# Patient Record
Sex: FEMALE | Race: WHITE | NOT HISPANIC OR LATINO | Employment: OTHER | URBAN - METROPOLITAN AREA
[De-identification: names, ages, dates, MRNs, and addresses within clinical notes are randomized per-mention and may not be internally consistent; named-entity substitution may affect disease eponyms.]

---

## 2017-01-23 ENCOUNTER — TELEPHONE (OUTPATIENT)
Dept: FAMILY MEDICINE | Facility: CLINIC | Age: 57
End: 2017-01-23

## 2017-01-23 DIAGNOSIS — E03.9 ACQUIRED HYPOTHYROIDISM: Primary | ICD-10-CM

## 2017-01-23 RX ORDER — LEVOTHYROXINE SODIUM 125 UG/1
TABLET ORAL
Qty: 30 TABLET | Refills: 1 | Status: SHIPPED
Start: 2017-01-23 | End: 2017-03-22

## 2017-01-23 NOTE — TELEPHONE ENCOUNTER
levothyroxine last OV - 12/6/16 last tsh -2011- due   Jackeline Nieto MA January 23, 2017 10:26 AM

## 2017-03-22 DIAGNOSIS — E03.9 ACQUIRED HYPOTHYROIDISM: ICD-10-CM

## 2017-03-22 RX ORDER — LEVOTHYROXINE SODIUM 125 UG/1
TABLET ORAL
Qty: 30 TABLET | Refills: 0 | Status: SHIPPED | OUTPATIENT
Start: 2017-03-22 | End: 2017-04-19

## 2017-03-22 NOTE — TELEPHONE ENCOUNTER
levothyroxine last OV 12/6/16 last TSH -2011 due- no appt scheduled  Jackeline Nieto MA March 22, 2017 7:45 AM

## 2017-04-11 DIAGNOSIS — E03.9 ACQUIRED HYPOTHYROIDISM: ICD-10-CM

## 2017-04-11 PROCEDURE — 36415 COLL VENOUS BLD VENIPUNCTURE: CPT | Performed by: FAMILY MEDICINE

## 2017-04-11 PROCEDURE — 84443 ASSAY THYROID STIM HORMONE: CPT | Mod: 90 | Performed by: FAMILY MEDICINE

## 2017-04-12 LAB — TSH BLD-ACNC: 3.56 UIU/ML (ref 0.45–4.5)

## 2017-04-19 ENCOUNTER — MYC REFILL (OUTPATIENT)
Dept: FAMILY MEDICINE | Facility: CLINIC | Age: 57
End: 2017-04-19

## 2017-04-19 DIAGNOSIS — E03.9 ACQUIRED HYPOTHYROIDISM: ICD-10-CM

## 2017-04-20 RX ORDER — LEVOTHYROXINE SODIUM 125 UG/1
125 TABLET ORAL DAILY
Qty: 90 TABLET | Refills: 3 | Status: SHIPPED | OUTPATIENT
Start: 2017-04-20 | End: 2017-05-16

## 2017-04-20 NOTE — TELEPHONE ENCOUNTER
Message from [employer]:  Freya Joseph Thu Apr 20, 2017 8:06 AM        ----- Message -----   From: Radha Bobo   Sent: 4/19/2017 10:38 PM   To: [facility] Triage  Subject: Medication Renewal Request     Original authorizing provider: MD Radha Saleh would like a refill of the following medications:  levothyroxine (SYNTHROID/LEVOTHROID) 125 MCG tablet [Alvarado Henry MD]    Preferred pharmacy: [facility] [id number] [address] [facility] - [place], [place] - [address]    Comment:  I only have one pill left for Wed. April 20. Please refill at [facility] [address] (in [facility] [address]) - [place], [place]. If you have questions, please call me. I did call it into the [facility] pharmacy tonight (Wed)- however, want to make sure I can pick it up tomorrow- Thank you!! Ana Bobo [phone]

## 2017-04-25 ENCOUNTER — TRANSFERRED RECORDS (OUTPATIENT)
Dept: FAMILY MEDICINE | Facility: CLINIC | Age: 57
End: 2017-04-25

## 2017-07-28 DIAGNOSIS — I10 ESSENTIAL HYPERTENSION WITH GOAL BLOOD PRESSURE LESS THAN 130/80: ICD-10-CM

## 2017-07-28 RX ORDER — POTASSIUM CHLORIDE 750 MG/1
CAPSULE, EXTENDED RELEASE ORAL
Qty: 180 CAPSULE | Refills: 3 | Status: SHIPPED | OUTPATIENT
Start: 2017-07-28 | End: 2018-08-01

## 2017-07-28 NOTE — TELEPHONE ENCOUNTER
KLOR-CON SPRINKLE 10 MEQ CR capsule; last OV: 7/27/2016    Component      Latest Ref Rng & Units 7/27/2016   Glucose      65 - 99 mg/dL 98   Urea Nitrogen      6 - 24 mg/dL 14   Creatinine      0.57 - 1.00 mg/dL 0.76   eGFR If NonAfricn Am      >59 mL/min/1.73 88   eGFR If Africn Am      >59 mL/min/1.73 101   BUN/Creatinine Ratio      9 - 23 18   Sodium      134 - 144 mmol/L 136   Potassium      3.5 - 5.2 mmol/L 4.4   Chloride      97 - 108 mmol/L 96 (L)   Total CO2      18 - 28 mmol/L 28   Calcium      8.7 - 10.2 mg/dL 9.5   Protein Total      6.0 - 8.5 g/dL 6.5   Albumin      3.5 - 5.5 g/dL 4.4   Globulin, Total      1.5 - 4.5 g/dL 2.1   A/G Ratio      1.1 - 2.5 2.1   Bilirubin Total      0.0 - 1.2 mg/dL 0.3   Alkaline Phosphatase      39 - 117 IU/L 58   AST      0 - 40 IU/L 19   ALT      0 - 32 IU/L 14

## 2017-08-01 ENCOUNTER — OFFICE VISIT (OUTPATIENT)
Dept: FAMILY MEDICINE | Facility: CLINIC | Age: 57
End: 2017-08-01

## 2017-08-01 VITALS
RESPIRATION RATE: 20 BRPM | OXYGEN SATURATION: 99 % | SYSTOLIC BLOOD PRESSURE: 124 MMHG | BODY MASS INDEX: 24.08 KG/M2 | DIASTOLIC BLOOD PRESSURE: 76 MMHG | HEART RATE: 64 BPM | WEIGHT: 149.8 LBS | HEIGHT: 66 IN

## 2017-08-01 DIAGNOSIS — Z12.31 VISIT FOR SCREENING MAMMOGRAM: ICD-10-CM

## 2017-08-01 DIAGNOSIS — Z12.11 SCREEN FOR COLON CANCER: ICD-10-CM

## 2017-08-01 DIAGNOSIS — Z76.0 ENCOUNTER FOR MEDICATION REFILL: ICD-10-CM

## 2017-08-01 DIAGNOSIS — D23.5 BENIGN NEOPLASM OF SKIN OF TRUNK, EXCEPT SCROTUM: Primary | ICD-10-CM

## 2017-08-01 DIAGNOSIS — M79.672 LEFT FOOT PAIN: ICD-10-CM

## 2017-08-01 DIAGNOSIS — J30.0 CHRONIC VASOMOTOR RHINITIS: ICD-10-CM

## 2017-08-01 PROCEDURE — 11300 SHAVE SKIN LESION 0.5 CM/<: CPT | Performed by: FAMILY MEDICINE

## 2017-08-01 PROCEDURE — 99213 OFFICE O/P EST LOW 20 MIN: CPT | Mod: 25 | Performed by: FAMILY MEDICINE

## 2017-08-01 RX ORDER — TRIAMCINOLONE ACETONIDE 55 UG/1
2 SPRAY, METERED NASAL 2 TIMES DAILY
Qty: 3 BOTTLE | Refills: 3 | Status: SHIPPED | OUTPATIENT
Start: 2017-08-01 | End: 2019-03-05

## 2017-08-01 RX ORDER — TRIAMCINOLONE ACETONIDE 55 UG/1
2 SPRAY, METERED NASAL DAILY
COMMUNITY
End: 2017-08-01

## 2017-08-01 RX ORDER — SIMVASTATIN 10 MG
10 TABLET ORAL AT BEDTIME
Qty: 90 TABLET | Refills: 3 | Status: SHIPPED | OUTPATIENT
Start: 2017-08-01 | End: 2018-10-02

## 2017-08-01 NOTE — PROGRESS NOTES
Subjective:  Here to discuss the status of the following problem(s):(Unless noted the problem(s) is/are stable and if applicable the medicine(s) being used is/are causing no side effects or problems.)    CC: Musculoskeletal Problem (left foot /toe 3rd- sore ); Orders; Mole (on back); and Allergies      3. Left foot pain  3rd toe  Ball of foot   5 years  Of pain and is getting worse  Tried separator between toes  - hurt  No numbness in toes  Has never seen podiatry    4. Lesion on back  Skin on back has a tag that rubs obn her clothing and is irritated    ROS:  General:  NEGATIVE for fever, chills, change in weight Musculoskeletal:  As above     Skin: as above      Past Medical History:   Diagnosis Date     Asthma     while in [place] she had no asthma     Colon adenoma      GERD (gastroesophageal reflux disease)      Migraines      Perioral dermatitis      Psoriasis 2013     developed in [place]- treated with steroids , just sarna in 2016     Restless leg syndrome      Rhinitis      Current Outpatient Prescriptions   Medication     triamcinolone (NASACORT ALLERGY 24HR) 55 MCG/ACT Inhaler     KLOR-CON SPRINKLE 10 MEQ CR capsule     levothyroxine (SYNTHROID/LEVOTHROID) 125 MCG tablet     lisinopril-hydrochlorothiazide (PRINZIDE,ZESTORETIC) 20-12.5 MG per tablet     simvastatin (ZOCOR) 10 MG tablet     cholecalciferol (VITAMIN D) 1000 UNIT tablet     buPROPion (BUDEPRION XL) 300 MG 24 hr tablet     sertraline (ZOLOFT) 100 MG tablet     conjugated estrogens (PREMARIN) vaginal cream     estradiol (ESTRACE) 0.5 MG tablet     Cetirizine HCl (ZYRTEC ALLERGY PO)     albuterol (PROAIR HFA/PROVENTIL HFA/VENTOLIN HFA) 108 (90 BASE) MCG/ACT Inhaler     No current facility-administered medications for this visit.       reports that she has never smoked. She has never used smokeless tobacco. She reports that she drinks about 1.2 oz of alcohol per week  She reports that she does not use illicit drugs.      EXAM:  BP: 124/76    Pulse: 64      Wt Readings from Last 2 Encounters:   08/01/17 67.9 kg (149 lb 12.8 oz)   12/06/16 69.5 kg (153 lb 3.2 oz)       BMI= Body mass index is 24.36 kg/(m^2).    EXAM:  Ana is a tanned woman who has red hair and freckles.  Her upper back on the RIGHT just below trapezius and above her scapula she has a 3 x  4 mm soft fleshy nevus.    Her LEFT foot appears normal she has no pain with squeezing her forefoot she has difficulty flexing her fourth toe but can extend it well there is a sense of fullness at the IP joint on the RIGHT fourth toe IP joint.  The other joints do not feel arthritic.  Sensation in her foot is normal pulses in her foot are normal    Procedure, Xylocaine With Epinephrine 1/10 of a cc injected underneath nevus.  After alcohol prep this lesion is shaved off and so overnight take cautery applied.    Assessment/Plan:  Radha was seen today for musculoskeletal problem, orders, mole and allergies.    Diagnoses and all orders for this visit:    Visit for screening mammogram  -     MAMMO -  Screening Digital Bilateral ([facility]); Future    Screen for colon cancer  She had a terrible experience with one of her colonoscopies and would like to discuss the prep and anesthesia with the gastroenterologist before moving forward with the colonoscopy  -     GASTROENTEROLOGY ADULT REF CONSULT ONLY  -     GASTROENTEROLOGY ADULT REF CONSULT ONLY    Left foot pain-  I believe she is developing arthritis in her toe.  She may benefit from the metatarsal support and I've recommended she see Dr. Dr. Hendrickson.  Benign neoplasm of skin of trunk, except scrotum  removed  Other orders            Alvarado Henry  [facility]

## 2017-08-01 NOTE — MR AVS SNAPSHOT
After Visit Summary   8/1/2017    Radha Bobo    MRN: [id number]           Patient Information     Date Of Birth          1960        Visit Information        Provider Department      8/1/2017 12:00 PM Alvarado Henry MD [facility]        Today's Diagnoses     Visit for screening mammogram    -  1    Screen for colon cancer        Left foot pain        Benign neoplasm of skin of trunk, except scrotum           Follow-ups after your visit        Additional Services     GASTROENTEROLOGY ADULT REF CONSULT ONLY       Preferred Location: [facility] [phone]      Please be aware that coverage of these services is subject to the terms and limitations of your health insurance plan.  Call member services at your health plan with any benefit or coverage questions.  Any procedures must be performed at a [place] facility OR coordinated by your clinic's referral office.    Please bring the following with you to your appointment:    (1) Any X-Rays, CTs or MRIs which have been performed.  Contact the facility where they were done to arrange for  prior to your scheduled appointment.    (2) List of current medications   (3) This referral request   (4) Any documents/labs given to you for this referral            GASTROENTEROLOGY ADULT REF CONSULT ONLY       Preferred Location: [facility] [phone]      Please be aware that coverage of these services is subject to the terms and limitations of your health insurance plan.  Call member services at your health plan with any benefit or coverage questions.  Any procedures must be performed at a [place] facility OR coordinated by your clinic's referral office.    Please bring the following with you to your appointment:    (1) Any X-Rays, CTs or MRIs which have been performed.  Contact the facility where they were done to arrange for  prior to your scheduled appointment.    (2) List of current medications   (3) This referral request   (4)  Any documents/labs given to you for this referral            PODIATRY/FOOT & ANKLE SURGERY REFERRAL       Your provider has referred you to: [facility]     Please be aware that coverage of these services is subject to the terms and limitations of your health insurance plan.  Call member services at your health plan with any benefit or coverage questions.      Please bring the following to your appointment:  >>   Any x-rays, CTs or MRIs which have been performed.  Contact the facility where they were done to arrange for  prior to your scheduled appointment.    >>   List of current medications   >>   This referral request   >>   Any documents/labs given to you for this referral                  Future tests that were ordered for you today     Open Future Orders        Priority Expected Expires Ordered    MAMMO -  Screening Digital Bilateral ([facility]) Routine  8/1/2018 8/1/2017            Who to contact     If you have questions or need follow up information about today's clinic visit or your schedule please contact [facility] directly at [phone].  Normal or non-critical lab and imaging results will be communicated to you by [facility], letter or phone within 4 business days after the clinic has received the results. If you do not hear from us within 7 days, please contact the clinic through [facility] or phone. If you have a critical or abnormal lab result, we will notify you by phone as soon as possible.  Submit refill requests through [facility] or call your pharmacy and they will forward the refill request to us. Please allow 3 business days for your refill to be completed.          Additional Information About Your Visit        [facility] Information     [facility] gives you secure access to your electronic health record. If you see a primary care provider, you can also send messages to your care team and make appointments. If you have questions, please call your primary care  "clinic.  If you do not have a primary care provider, please call [phone] and they will assist you.        Care EveryWhere ID     This is your Care EveryWhere ID. This could be used by other organizations to access your [place] medical records  [id number]        Your Vitals Were     Pulse Respirations Height Pulse Oximetry BMI (Body Mass Index)       64 20 1.67 m (5' 5.75\") 99% 24.36 kg/m2        Blood Pressure from Last 3 Encounters:   08/01/17 124/76   12/06/16 102/68   07/27/16 112/84    Weight from Last 3 Encounters:   08/01/17 67.9 kg (149 lb 12.8 oz)   12/06/16 69.5 kg (153 lb 3.2 oz)   07/27/16 67 kg (147 lb 12.8 oz)              We Performed the Following     GASTROENTEROLOGY ADULT REF CONSULT ONLY     GASTROENTEROLOGY ADULT REF CONSULT ONLY     PODIATRY/FOOT & ANKLE SURGERY REFERRAL        Primary Care Provider Office Phone # Fax #    Alvarado Henry [phone]       [facility] [address] [place] [address]        Equal Access to Services     [facility]: Hadii aad ku hadasho Soomaali, waaxda luqadaha, qaybta kaalmada adeegyada, waxay idiin hayaan adeeg junarapura lavlad . So [facility] [phone].    ATENCIÓN: Si habla español, tiene a dumont disposición servicios gratuitos de asistencia lingüística. Llame al [phone].    We comply with applicable federal civil rights laws and [place] laws. We do not discriminate on the basis of race, color, national origin, age, disability sex, sexual orientation or gender identity.            Thank you!     Thank you for choosing [facility]  for your care. Our goal is always to provide you with excellent care. Hearing back from our patients is one way we can continue to improve our services. Please take a few minutes to complete the written survey that you may receive in the mail after your visit with us. Thank you!             Your Updated Medication List - Protect others around you: Learn how to safely use, " store and throw away your medicines at www.disposemymeds.org.          This list is accurate as of: 8/1/17 12:31 PM.  Always use your most recent med list.                   Brand Name Dispense Instructions for use Diagnosis    albuterol 108 (90 BASE) MCG/ACT Inhaler    PROAIR HFA/PROVENTIL HFA/VENTOLIN HFA    1 Inhaler    Inhale 2 puffs into the lungs 4 times daily    Cough, Upper respiratory tract infection, unspecified type       buPROPion 300 MG 24 hr tablet    BUDEPRION XL    90 tablet    Take 1 tablet (300 mg) by mouth every morning    Major depression in complete remission (H)       cholecalciferol 1000 UNIT tablet    vitamin D    90 tablet    Take 1 tablet (1,000 Units) by mouth daily    Low vitamin D level       conjugated estrogens cream    PREMARIN    42.5 g    Place 42.5 g vaginally twice a week.    Menopausal syndrome (hot flashes)       estradiol 0.5 MG tablet    ESTRACE    90 tablet    Take 1 tablet by mouth daily.    Preventative health care       KLOR-CON SPRINKLE 10 MEQ CR capsule   Generic drug:  potassium chloride     180 capsule    TAKE 2 CAPSULES BY MOUTH EVERY DAY    Essential hypertension with goal blood pressure less than 130/80       levothyroxine 125 MCG tablet    SYNTHROID/LEVOTHROID    30 tablet    TAKE ONE TABLET BY MOUTH EVERY DAY **BLOOD WORK REQUIRED FOR FURTHER REFILLS**    Acquired hypothyroidism       lisinopril-hydrochlorothiazide 20-12.5 MG per tablet    PRINZIDE/ZESTORETIC    90 tablet    Take 1 tablet by mouth daily    Encounter for medication refill       NASACORT ALLERGY 24HR 55 MCG/ACT Inhaler   Generic drug:  triamcinolone      Spray 2 sprays into both nostrils daily        sertraline 100 MG tablet    ZOLOFT    90 tablet    Take  by mouth. TAKE 1 TABLET BY MOUTH DAILY    GERD (gastroesophageal reflux disease), Restless leg syndrome, Colon adenoma, Rhinitis, Perioral dermatitis, Asthma, Hyperlipidemia LDL goal <130, Depressive disorder, not elsewhere classified, Anxiety,  Hypothyroidism, Need for prophylactic vaccination with tetanus-diphtheria (TD)       simvastatin 10 MG tablet    ZOCOR    90 tablet    Take 1 tablet (10 mg) by mouth At Bedtime    Encounter for medication refill       ZYRTEC ALLERGY PO      Take  by mouth daily.    GERD (gastroesophageal reflux disease), Restless leg syndrome, Colon adenoma, Rhinitis, Perioral dermatitis, Asthma, Hyperlipidemia LDL goal <130, Depressive disorder, not elsewhere classified, Anxiety, Hypothyroidism, Need for prophylactic vaccination with tetanus-diphtheria (TD)

## 2017-08-02 ENCOUNTER — HOSPITAL ENCOUNTER (OUTPATIENT)
Dept: MAMMOGRAPHY | Facility: CLINIC | Age: 57
Discharge: HOME | End: 2017-08-02
Attending: FAMILY MEDICINE | Admitting: FAMILY MEDICINE
Payer: COMMERCIAL

## 2017-08-02 DIAGNOSIS — Z12.31 VISIT FOR SCREENING MAMMOGRAM: ICD-10-CM

## 2017-08-02 PROCEDURE — 77063 BREAST TOMOSYNTHESIS BI: CPT

## 2017-08-09 DIAGNOSIS — D12.6 COLON ADENOMA: ICD-10-CM

## 2017-08-09 DIAGNOSIS — G25.81 RESTLESS LEG SYNDROME: ICD-10-CM

## 2017-08-09 RX ORDER — SERTRALINE HYDROCHLORIDE 100 MG/1
TABLET, FILM COATED ORAL
Qty: 90 TABLET | Refills: 3 | Status: SHIPPED | OUTPATIENT
Start: 2017-08-09 | End: 2018-08-01

## 2017-08-17 DIAGNOSIS — F32.5 MAJOR DEPRESSION IN COMPLETE REMISSION (H): ICD-10-CM

## 2017-08-17 RX ORDER — BUPROPION HYDROCHLORIDE 300 MG/1
TABLET ORAL
Qty: 90 TABLET | Refills: 3 | Status: SHIPPED | OUTPATIENT
Start: 2017-08-17 | End: 2018-08-06

## 2017-08-17 NOTE — TELEPHONE ENCOUNTER
wellbutrin last OV - 12/6/16 last labs 4/11/17   Jackleine Nieto MA August 17, 2017 8:08 AM    PHQ-9 SCORE 7/27/2016 12/6/2016   Total Score 0 0

## 2017-09-26 DIAGNOSIS — R79.89 LOW VITAMIN D LEVEL: ICD-10-CM

## 2017-09-26 RX ORDER — CHOLECALCIFEROL (VITAMIN D3) 25 MCG
TABLET ORAL
Qty: 90 TABLET | Refills: 3 | Status: SHIPPED | OUTPATIENT
Start: 2017-09-26

## 2017-10-06 DIAGNOSIS — Z76.0 ENCOUNTER FOR MEDICATION REFILL: ICD-10-CM

## 2017-10-06 DIAGNOSIS — I10 ESSENTIAL HYPERTENSION WITH GOAL BLOOD PRESSURE LESS THAN 130/80: Primary | ICD-10-CM

## 2017-10-06 RX ORDER — LISINOPRIL AND HYDROCHLOROTHIAZIDE 12.5; 2 MG/1; MG/1
TABLET ORAL
Qty: 30 TABLET | Refills: 1 | Status: SHIPPED | OUTPATIENT
Start: 2017-10-06 | End: 2017-12-06

## 2017-10-06 NOTE — TELEPHONE ENCOUNTER
BP Medication refill        BP Readings from Last 3 Encounters:   08/01/17 124/76   12/06/16 102/68   07/27/16 112/84         BMP within 6 months? DUE FOR LABS  Last Basic Metabolic Panel:  Lab Results   Component Value Date     07/27/2016      Lab Results   Component Value Date    POTASSIUM 4.4 07/27/2016     Lab Results   Component Value Date    CHLORIDE 96 07/27/2016     Lab Results   Component Value Date    NEETU 9.5 07/27/2016     No results found for: CO2  Lab Results   Component Value Date    BUN 14 07/27/2016    BUN 18 07/27/2016     Lab Results   Component Value Date    CR 0.76 07/27/2016     Lab Results   Component Value Date    GLC 98 07/27/2016

## 2017-10-06 NOTE — TELEPHONE ENCOUNTER
Overdue for BMP per Dr. Key (oncall for Dr. Delarosa). Last BMP was 7/2016.   Also due for lipids - takes simvastatin.   Left message on patient's vm due for fasting labs and cpx ----med check at a minimum. Advised sending 2 month supply to pharm - get appt(s) for MD and labs before next refill needed.  Davida Jaramillo RN

## 2017-11-08 ENCOUNTER — VIRTUAL VISIT (OUTPATIENT)
Dept: FAMILY MEDICINE | Facility: OTHER | Age: 57
End: 2017-11-08

## 2017-11-09 NOTE — PROGRESS NOTES
"Date:   Clinician: Elvira Lucero  Clinician NPI: [id number]  Patient: Radha Bobo  Patient : 1960  [facility] [place] [address]  Patient Phone: [phone]  Visit Protocol: Ear pain  Patient Summary:  Radha is a 57 year old ( : 1960 ) female who initiated a Visit for swimmer's ear (ear pain). When asked the question \"Please sign me up to receive news, health information and promotions from [facility].\", Radha responded \"Yes\".    Radha reports that her ear pain started 5-7 days ago. The ear pain is located inside the right ear.   In addition to the ear pain, Radha is experiencing a feeling of fullness, tenderness, and itchiness in the ear(s). Her ear(s) is tender to the touch on the ear canal.   Symptom Details   Pain: Radha is experiencing moderate pain. It gets worse when she eats or chews and gently pulls on the earlobe(s).    Radha denies redness in the ear(s). She also denies feeling feverish, having fluid draining from the ear(s), the possibility of a foreign object in the ear(s), recent injuries near the ear(s), and ever having ear tubes. Radha denies swimming within the past week.   Radha reports flying within the past week.   Weight: 147 lbs   She denies pregnancy and denies breastfeeding. She does not menstruate.   She does not smoke or use smokeless tobacco.   Additional health information pertinent to this Visit as reported by the patient (free text): After putting earache drops (over the counter) in ear tonight- I used q-tip and dried blood was on after wiping inside of right ear. Have had 2 ear infections before after flying and came back from [place] last week.   MEDICATIONS:  Thyroid, oxymetazoline (Afrin, Dristan), bupropion (Buproban, Wellbutrin, Zyban), lisinopril HCTZ (Prinzide, Zestoretic), lisinopril (Prinivil, Zestril), saline nose drops/spray (SeaMist, Ocean Nasal Spray), pseudoephedrine (Sudafed, " Dimetapp), sertraline (Zoloft), and simvastatin (Zocor)  , ALLERGIES:  NKDA   Clinician Response:  Dear Radha,   I am sorry you are not feeling well. Your health is our priority. Based on the information you have provided, an in-person evaluation is required. Please be seen in a clinic or urgent care to receive the additional care you need.  You will not be charged for this Visit. Thank you for trusting us with your care.   Diagnosis: Refer for additional evaluation  Diagnosis ICD: R69  Diagnosis ICD: 462.0

## 2017-11-15 ENCOUNTER — OFFICE VISIT (OUTPATIENT)
Dept: FAMILY MEDICINE | Facility: CLINIC | Age: 57
End: 2017-11-15

## 2017-11-15 VITALS
WEIGHT: 153.2 LBS | HEART RATE: 76 BPM | BODY MASS INDEX: 24.92 KG/M2 | TEMPERATURE: 97.7 F | RESPIRATION RATE: 20 BRPM | OXYGEN SATURATION: 97 % | DIASTOLIC BLOOD PRESSURE: 64 MMHG | SYSTOLIC BLOOD PRESSURE: 116 MMHG

## 2017-11-15 DIAGNOSIS — E03.8 OTHER SPECIFIED HYPOTHYROIDISM: ICD-10-CM

## 2017-11-15 DIAGNOSIS — I10 ESSENTIAL HYPERTENSION WITH GOAL BLOOD PRESSURE LESS THAN 130/80: ICD-10-CM

## 2017-11-15 DIAGNOSIS — H93.8X1 EAR PRESSURE, RIGHT: ICD-10-CM

## 2017-11-15 DIAGNOSIS — J45.20 INTERMITTENT ASTHMA, UNSPECIFIED ASTHMA SEVERITY, UNSPECIFIED WHETHER COMPLICATED: Primary | ICD-10-CM

## 2017-11-15 DIAGNOSIS — E78.00 HIGH CHOLESTEROL: ICD-10-CM

## 2017-11-15 DIAGNOSIS — R05.9 COUGH: ICD-10-CM

## 2017-11-15 PROCEDURE — 99213 OFFICE O/P EST LOW 20 MIN: CPT | Performed by: FAMILY MEDICINE

## 2017-11-15 PROCEDURE — 84443 ASSAY THYROID STIM HORMONE: CPT | Mod: 90 | Performed by: FAMILY MEDICINE

## 2017-11-15 PROCEDURE — 80061 LIPID PANEL: CPT | Mod: 90 | Performed by: FAMILY MEDICINE

## 2017-11-15 PROCEDURE — 80053 COMPREHEN METABOLIC PANEL: CPT | Mod: 90 | Performed by: FAMILY MEDICINE

## 2017-11-15 PROCEDURE — 36415 COLL VENOUS BLD VENIPUNCTURE: CPT | Performed by: FAMILY MEDICINE

## 2017-11-15 RX ORDER — CODEINE PHOSPHATE AND GUAIFENESIN 10; 100 MG/5ML; MG/5ML
SOLUTION ORAL
Qty: 120 ML | Refills: 0 | Status: SHIPPED | OUTPATIENT
Start: 2017-11-15 | End: 2018-12-23

## 2017-11-15 ASSESSMENT — PATIENT HEALTH QUESTIONNAIRE - PHQ9
5. POOR APPETITE OR OVEREATING: NOT AT ALL
SUM OF ALL RESPONSES TO PHQ QUESTIONS 1-9: 1

## 2017-11-15 ASSESSMENT — ANXIETY QUESTIONNAIRES
1. FEELING NERVOUS, ANXIOUS, OR ON EDGE: NOT AT ALL
6. BECOMING EASILY ANNOYED OR IRRITABLE: NOT AT ALL
2. NOT BEING ABLE TO STOP OR CONTROL WORRYING: SEVERAL DAYS
5. BEING SO RESTLESS THAT IT IS HARD TO SIT STILL: NOT AT ALL
3. WORRYING TOO MUCH ABOUT DIFFERENT THINGS: NOT AT ALL
7. FEELING AFRAID AS IF SOMETHING AWFUL MIGHT HAPPEN: NOT AT ALL
IF YOU CHECKED OFF ANY PROBLEMS ON THIS QUESTIONNAIRE, HOW DIFFICULT HAVE THESE PROBLEMS MADE IT FOR YOU TO DO YOUR WORK, TAKE CARE OF THINGS AT HOME, OR GET ALONG WITH OTHER PEOPLE: NOT DIFFICULT AT ALL
GAD7 TOTAL SCORE: 1

## 2017-11-15 NOTE — LETTER
My Asthma Action Plan  Name: Radha Bobo   YOB: 1960  Date: 11/15/2017   My doctor: Alvarado Henry MD   My clinic: [facility]        My Control Medicine: zyrtec   My Rescue Medicine: Albuterol (Proair/Ventolin/Proventil) inhaler prn  My Oral Steroid Medicine: prednisone My Asthma Severity: intermittent  Avoid your asthma triggers: smoke, upper respiratory infections, dust mites, humidity, strong odors and fumes and emotions               GREEN ZONE   Good Control    I feel good    No cough or wheeze    Can work, sleep and play without asthma symptoms       Take your asthma control medicine every day.     1. If exercise triggers your asthma, take your rescue medication    15 minutes before exercise or sports, and    During exercise if you have asthma symptoms  2. Spacer to use with inhaler: If you have a spacer, make sure to use it with your inhaler             YELLOW ZONE Getting Worse  I have ANY of these:    I do not feel good    Cough or wheeze    Chest feels tight    Wake up at night   1. Keep taking your Green Zone medications  2. Start taking your rescue medicine:    every 20 minutes for up to 1 hour. Then every 4 hours for 24-48 hours.  3. If you stay in the Yellow Zone for more than 12-24 hours, contact your doctor.  4. If you do not return to the Green Zone in 12-24 hours or you get worse, start taking your oral steroid medicine if prescribed by your provider.           RED ZONE Medical Alert - Get Help  I have ANY of these:    I feel awful    Medicine is not helping    Breathing getting harder    Trouble walking or talking    Nose opens wide to breathe       1. Take your rescue medicine NOW  2. If your provider has prescribed an oral steroid medicine, start taking it NOW  3. Call your doctor NOW  4. If you are still in the Red Zone after 20 minutes and you have not reached your doctor:    Take your rescue medicine again and    Call 911 or go to the emergency room right  away    See your regular doctor within 2 weeks of an Emergency Room or Urgent Care visit for follow-up treatment.        Electronically signed by: Jackeline Nieto, November 15, 2017    Annual Reminders:  Meet with Asthma Educator,  Flu Shot in the Fall, consider Pneumonia Vaccination for patients with asthma (aged 19 and older).    Pharmacy:    [facility] - FAX ONLY  [id number] - [place], [place] - [address]                    Asthma Triggers  How To Control Things That Make Your Asthma Worse    Triggers are things that make your asthma worse.  Look at the list below to help you find your triggers and what you can do about them.  You can help prevent asthma flare-ups by staying away from your triggers.      Trigger                                                          What you can do   Cigarette Smoke  Tobacco smoke can make asthma worse. Do not allow smoking in your home, car or around you.  Be sure no one smokes at a child s day care or school.  If you smoke, ask your health care provider for ways to help you quit.  Ask family members to quit too.  Ask your health care provider for a referral to Quit Plan to help you quit smoking, or call 2-125-692-PLAN.     Colds, Flu, Bronchitis  These are common triggers of asthma. Wash your hands often.  Don t touch your eyes, nose or mouth.  Get a flu shot every year.     Dust Mites  These are tiny bugs that live in cloth or carpet. They are too small to see. Wash sheets and blankets in hot water every week.   Encase pillows and mattress in dust mite proof covers.  Avoid having carpet if you can. If you have carpet, vacuum weekly.   Use a dust mask and HEPA vacuum.   Pollen and Outdoor Mold  Some people are allergic to trees, grass, or weed pollen, or molds. Try to keep your windows closed.  Limit time out doors when pollen count is high.   Ask you health care provider about taking medicine during allergy season.     Animal Dander  Some  people are allergic to skin flakes, urine or saliva from pets with fur or feathers. Keep pets with fur or feathers out of your home.    If you can t keep the pet outdoors, then keep the pet out of your bedroom.  Keep the bedroom door closed.  Keep pets off cloth furniture and away from stuffed toys.     Mice, Rats, and Cockroaches  Some people are allergic to the waste from these pests.   Cover food and garbage.  Clean up spills and food crumbs.  Store grease in the refrigerator.   Keep food out of the bedroom.   Indoor Mold  This can be a trigger if your home has high moisture. Fix leaking faucets, pipes, or other sources of water.   Clean moldy surfaces.  Dehumidify basement if it is damp and smelly.   Smoke, Strong Odors, and Sprays  These can reduce air quality. Stay away from strong odors and sprays, such as perfume, powder, hair spray, paints, smoke incense, paint, cleaning products, candles and new carpet.   Exercise or Sports  Some people with asthma have this trigger. Be active!  Ask your doctor about taking medicine before sports or exercise to prevent symptoms.    Warm up for 5-10 minutes before and after sports or exercise.     Other Triggers of Asthma  Cold air:  Cover your nose and mouth with a scarf.  Sometimes laughing or crying can be a trigger.  Some medicines and food can trigger asthma.

## 2017-11-15 NOTE — PROGRESS NOTES
Multiple medical issues    Right ear  Post cold has some pressure feeling  Muffled hearing and mild , has woken her   teeth upper pain     Hist of asthma   Has cough and mucous   Mild sob uses inhaler \  Using three times /d for a week  Feels like the same     Past Medical History:   Diagnosis Date     Asthma     while in [place] she had no asthma     Colon adenoma      GERD (gastroesophageal reflux disease)      High cholesterol      Migraines      Perioral dermatitis      Psoriasis 2013     developed in [place]- treated with steroids , just sarna in 2016     Restless leg syndrome      Rhinitis      Past Surgical History:   Procedure Laterality Date     HYSTERECTOMY, PAP NO LONGER INDICATED       Current Outpatient Prescriptions   Medication     guaiFENesin-codeine (ROBITUSSIN AC) 100-10 MG/5ML SOLN solution     lisinopril-hydrochlorothiazide (PRINZIDE/ZESTORETIC) 20-12.5 MG per tablet     VITAMIN D3 1000 UNITS tablet     buPROPion (WELLBUTRIN XL) 300 MG 24 hr tablet     sertraline (ZOLOFT) 100 MG tablet     simvastatin (ZOCOR) 10 MG tablet     triamcinolone (NASACORT ALLERGY 24HR) 55 MCG/ACT Inhaler     KLOR-CON SPRINKLE 10 MEQ CR capsule     levothyroxine (SYNTHROID/LEVOTHROID) 125 MCG tablet     albuterol (PROAIR HFA/PROVENTIL HFA/VENTOLIN HFA) 108 (90 BASE) MCG/ACT Inhaler     conjugated estrogens (PREMARIN) vaginal cream     estradiol (ESTRACE) 0.5 MG tablet     Cetirizine HCl (ZYRTEC ALLERGY PO)     No current facility-administered medications for this visit.      Ros no fever,     /64  Pulse 76  Temp 97.7  F (36.5  C) (Oral)  Resp 20  Wt 69.5 kg (153 lb 3.2 oz)  SpO2 97%  BMI 24.92 kg/m2    VS noted   EYES = NL  EARS= NL  MOUTH =NL  NECK = NL  LUNGS=NL  COR=NL  ABD=NL  EXT=NL   MOOD AFFECT =NL      ASSESSMENT / PLAN  (J45.20) Intermittent asthma, unspecified asthma severity, unspecified whether complicated  (primary encounter diagnosis)  Comment:   Not an issue , has no flare now.  Rare prn  albuterol    (E03.8) Other specified hypothyroidism  Plan: TSH ([facility])         (I10) Essential hypertension with goal blood pressure less than 130/80  Plan: Comp. Metabolic Panel (14) ([facility])        (E78.00) High cholesterol  Plan: Lipid Panel ([facility])      (R05) Cough  Prn use of for flare ups. This lasted a full year last year  Plan: guaiFENesin-codeine (ROBITUSSIN AC) 100-10         MG/5ML SOLN solution       (H93.8X1) Ear pressure, right  As she is to travel soon and I see nothing to explain issue  Plan: Referral to [facility]         Head/Neck Surgery        **   Asaf Delarosa MD

## 2017-11-15 NOTE — MR AVS SNAPSHOT
After Visit Summary   11/15/2017    Radha Bobo    MRN: [id number]           Patient Information     Date Of Birth          1960        Visit Information        Provider Department      11/15/2017 8:45 AM Alvarado Henry MD [facility]        Today's Diagnoses     Intermittent asthma, unspecified asthma severity, unspecified whether complicated    -  1    Other specified hypothyroidism        Essential hypertension with goal blood pressure less than 130/80        High cholesterol        Cough        Ear pressure, right           Follow-ups after your visit        Additional Services     Referral to [facility]ck [facility]       Referral to [facility]  Phone:  [phone]  Reason for Referral:  ANY doc -    Please be aware that coverage of these services is subject to the terms and limitations of your health insurance plan.  Call member services at your health plan with any benefit or coverage questions.                  Who to contact     If you have questions or need follow up information about today's clinic visit or your schedule please contact [facility] directly at [phone].  Normal or non-critical lab and imaging results will be communicated to you by Valke[employer]art, letter or phone within 4 business days after the clinic has received the results. If you do not hear from us within 7 days, please contact the clinic through [employer] Brother[employer] or phone. If you have a critical or abnormal lab result, we will notify you by phone as soon as possible.  Submit refill requests through Piece of Cake or call your pharmacy and they will forward the refill request to us. Please allow 3 business days for your refill to be completed.          Additional Information About Your Visit        Valkeehart Information     Piece of Cake gives you secure access to your electronic health record. If you see a primary care provider, you can also send messages to  your care team and make appointments. If you have questions, please call your primary care clinic.  If you do not have a primary care provider, please call [phone] and they will assist you.        Care EveryWhere ID     This is your Care EveryWhere ID. This could be used by other organizations to access your [facility] medical records  [id number]        Your Vitals Were     Pulse Temperature Respirations Pulse Oximetry BMI (Body Mass Index)       76 97.7  F (36.5  C) (Oral) 20 97% 24.92 kg/m2        Blood Pressure from Last 3 Encounters:   11/15/17 116/64   08/01/17 124/76   12/06/16 102/68    Weight from Last 3 Encounters:   11/15/17 69.5 kg (153 lb 3.2 oz)   08/01/17 67.9 kg (149 lb 12.8 oz)   12/06/16 69.5 kg (153 lb 3.2 oz)              We Performed the Following     Comp. Metabolic Panel (14) ([facility])     DEPRESSION ACTION PLAN (DAP)     Lipid Panel ([facility])     Referral to [facility]     TSH ([facility])          Today's Medication Changes          These changes are accurate as of: 11/15/17  9:25 AM.  If you have any questions, ask your nurse or doctor.               Start taking these medicines.        Dose/Directions    guaiFENesin-codeine 100-10 MG/5ML Soln solution   Commonly known as:  ROBITUSSIN AC   Used for:  Cough, Intermittent asthma, unspecified asthma severity, unspecified whether complicated   Started by:  Alvarado Henry MD        Take one to two tsp every 4-6 hours prn cough   Quantity:  120 mL   Refills:  0            Where to get your medicines      Some of these will need a paper prescription and others can be bought over the counter.  Ask your nurse if you have questions.     Bring a paper prescription for each of these medications     guaiFENesin-codeine 100-10 MG/5ML Soln solution                Primary Care Provider Office Phone # Fax #    Alvarado Henry [phone] [address]  [facility] [id number]        Equal Access  to Services     JIM PIÑA : Laurie Rincon, wastuart colon, qaarpitjames tesfayealjoe short. So [facility] [phone].    ATENCIÓN: Si martínezla nimisha, tiene a dumont disposición servicios gratuitos de asistencia lingüística. Llame al [phone].    We comply with applicable federal civil rights laws and [place] laws. We do not discriminate on the basis of race, color, national origin, age, disability, sex, sexual orientation, or gender identity.            Thank you!     Thank you for choosing [facility]  for your care. Our goal is always to provide you with excellent care. Hearing back from our patients is one way we can continue to improve our services. Please take a few minutes to complete the written survey that you may receive in the mail after your visit with us. Thank you!             Your Updated Medication List - Protect others around you: Learn how to safely use, store and throw away your medicines at www.disposemymeds.org.          This list is accurate as of: 11/15/17  9:25 AM.  Always use your most recent med list.                   Brand Name Dispense Instructions for use Diagnosis    albuterol 108 (90 BASE) MCG/ACT Inhaler    PROAIR HFA/PROVENTIL HFA/VENTOLIN HFA    1 Inhaler    Inhale 2 puffs into the lungs 4 times daily    Cough, Upper respiratory tract infection, unspecified type       buPROPion 300 MG 24 hr tablet    WELLBUTRIN XL    90 tablet    TAKE 1 TABLET (300 MG) BY MOUTH EVERY MORNING    Major depression in complete remission (H)       cholecalciferol 1000 UNIT tablet    vitamin D3    90 tablet    TAKE 1 TABLET (1,000 UNITS) BY MOUTH DAILY    Low vitamin D level       conjugated estrogens cream    PREMARIN    42.5 g    Place 42.5 g vaginally twice a week.    Menopausal syndrome (hot flashes)       estradiol 0.5 MG tablet    ESTRACE    90 tablet    Take 1 tablet by mouth daily.    Preventative health care        guaiFENesin-codeine 100-10 MG/5ML Soln solution    ROBITUSSIN AC    120 mL    Take one to two tsp every 4-6 hours prn cough    Cough, Intermittent asthma, unspecified asthma severity, unspecified whether complicated       KLOR-CON SPRINKLE 10 MEQ CR capsule   Generic drug:  potassium chloride     180 capsule    TAKE 2 CAPSULES BY MOUTH EVERY DAY    Essential hypertension with goal blood pressure less than 130/80       levothyroxine 125 MCG tablet    SYNTHROID/LEVOTHROID    30 tablet    TAKE ONE TABLET BY MOUTH EVERY DAY **BLOOD WORK REQUIRED FOR FURTHER REFILLS**    Acquired hypothyroidism       lisinopril-hydrochlorothiazide 20-12.5 MG per tablet    PRINZIDE/ZESTORETIC    30 tablet    TAKE 1 TABLET BY MOUTH DAILY    Essential hypertension with goal blood pressure less than 130/80       sertraline 100 MG tablet    ZOLOFT    90 tablet    Take 1 tablet daily    Restless leg syndrome, Colon adenoma       simvastatin 10 MG tablet    ZOCOR    90 tablet    Take 1 tablet (10 mg) by mouth At Bedtime    Encounter for medication refill       triamcinolone 55 MCG/ACT Inhaler    NASACORT ALLERGY 24HR    3 Bottle    Spray 2 sprays into both nostrils 2 times daily    Chronic vasomotor rhinitis       ZYRTEC ALLERGY PO      Take  by mouth daily.    GERD (gastroesophageal reflux disease), Restless leg syndrome, Colon adenoma, Rhinitis, Perioral dermatitis, Asthma, Hyperlipidemia LDL goal <130, Depressive disorder, not elsewhere classified, Anxiety, Hypothyroidism, Need for prophylactic vaccination with tetanus-diphtheria (TD)

## 2017-11-15 NOTE — LETTER
My Depression Action Plan  Name: Radha Bobo   Date of Birth 1960  Date: 11/15/2017    My doctor: Alvarado Henry   My clinic: [facility] [address] [place] [address] [phone]          GREEN    ZONE   Good Control    What it looks like:     Things are going generally well. You have normal up s and down s. You may even feel depressed from time to time, but bad moods usually last less than a day.   What you need to do:  1. Continue to care for yourself (see self care plan)  2. Check your depression survival kit and update it as needed  3. Follow your physician s recommendations including any medication.  4. Do not stop taking medication unless you consult with your physician first.           YELLOW         ZONE Getting Worse    What it looks like:     Depression is starting to interfere with your life.     It may be hard to get out of bed; you may be starting to isolate yourself from others.    Symptoms of depression are starting to last most all day and this has happened for several days.     You may have suicidal thoughts but they are not constant.   What you need to do:     1. Call your care team, your response to treatment will improve if you keep your care team informed of your progress. Yellow periods are signs an adjustment may need to be made.     2. Continue your self-care, even if you have to fake it!    3. Talk to someone in your support network    4. Open up your depression survival kit           RED    ZONE Medical Alert - Get Help    What it looks like:     Depression is seriously interfering with your life.     You may experience these or other symptoms: You can t get out of bed most days, can t work or engage in other necessary activities, you have trouble taking care of basic hygiene, or basic responsibilities, thoughts of suicide or death that will not go away, self-injurious behavior.     What you need to do:  1. Call your care team and request  a same-day appointment. If they are not available (weekends or after hours) call your local crisis line, emergency room or 911.      Electronically signed by: Jackeline Nieto, November 15, 2017    Depression Self Care Plan / Survival Kit    Self-Care for Depression  Here s the deal. Your body and mind are really not as separate as most people think.  What you do and think affects how you feel and how you feel influences what you do and think. This means if you do things that people who feel good do, it will help you feel better.  Sometimes this is all it takes.  There is also a place for medication and therapy depending on how severe your depression is, so be sure to consult with your medical provider and/ or Behavioral Health Consultant if your symptoms are worsening or not improving.     In order to better manage my stress, I will:    Exercise  Get some form of exercise, every day. This will help reduce pain and release endorphins, the  feel good  chemicals in your brain. This is almost as good as taking antidepressants!  This is not the same as joining a gym and then never going! (they count on that by the way ) It can be as simple as just going for a walk or doing some gardening, anything that will get you moving.      Hygiene   Maintain good hygiene (Get out of bed in the morning, Make your bed, Brush your teeth, Take a shower, and Get dressed like you were going to work, even if you are unemployed).  If your clothes don't fit try to get ones that do.    Diet  I will strive to eat foods that are good for me, drink plenty of water, and avoid excessive sugar, caffeine, alcohol, and other mood-altering substances.  Some foods that are helpful in depression are: complex carbohydrates, B vitamins, flaxseed, fish or fish oil, fresh fruits and vegetables.    Psychotherapy  I agree to participate in Individual Therapy (if recommended).    Medication  If prescribed medications, I agree to take them.  Missing doses can  result in serious side effects.  I understand that drinking alcohol, or other illicit drug use, may cause potential side effects.  I will not stop my medication abruptly without first discussing it with my provider.    Staying Connected With Others  I will stay in touch with my friends, family members, and my primary care provider/team.    Use your imagination  Be creative.  We all have a creative side; it doesn t matter if it s oil painting, sand castles, or mud pies! This will also kick up the endorphins.    Witness Beauty  (AKA stop and smell the roses) Take a look outside, even in mid-winter. Notice colors, textures. Watch the squirrels and birds.     Service to others  Be of service to others.  There is always someone else in need.  By helping others we can  get out of ourselves  and remember the really important things.  This also provides opportunities for practicing all the other parts of the program.    Humor  Laugh and be silly!  Adjust your TV habits for less news and crime-drama and more comedy.    Control your stress  Try breathing deep, massage therapy, biofeedback, and meditation. Find time to relax each day.     My support system    Clinic Contact:  Phone number:    Contact 1:  Phone number:    Contact 2:  Phone number:    Scientologist/:  Phone number:    Therapist:  Phone number:    Local crisis center:    Phone number:    Other community support:  Phone number:

## 2017-11-16 LAB
ALBUMIN SERPL-MCNC: 4.6 G/DL (ref 3.5–5.5)
ALBUMIN/GLOB SERPL: 2.2 {RATIO} (ref 1.2–2.2)
ALP SERPL-CCNC: 75 IU/L (ref 39–117)
ALT SERPL-CCNC: 16 IU/L (ref 0–32)
AST SERPL-CCNC: 14 IU/L (ref 0–40)
BILIRUB SERPL-MCNC: 0.2 MG/DL (ref 0–1.2)
BUN SERPL-MCNC: 22 MG/DL (ref 6–24)
BUN/CREATININE RATIO: 23 (ref 9–23)
CALCIUM SERPL-MCNC: 9.5 MG/DL (ref 8.7–10.2)
CHLORIDE SERPLBLD-SCNC: 95 MMOL/L (ref 96–106)
CHOLEST SERPL-MCNC: 232 MG/DL (ref 100–199)
CREAT SERPL-MCNC: 0.94 MG/DL (ref 0.57–1)
EGFR IF AFRICN AM: 77 ML/MIN/1.73
EGFR IF NONAFRICN AM: 67 ML/MIN/1.73
GLOBULIN, TOTAL: 2.1 G/DL (ref 1.5–4.5)
GLUCOSE SERPL-MCNC: 96 MG/DL (ref 65–99)
HDLC SERPL-MCNC: 74 MG/DL
LDL/HDL RATIO: 1.7 RATIO UNITS (ref 0–3.2)
LDLC SERPL CALC-MCNC: 125 MG/DL (ref 0–99)
POTASSIUM SERPL-SCNC: 4.3 MMOL/L (ref 3.5–5.2)
PROT SERPL-MCNC: 6.7 G/DL (ref 6–8.5)
SODIUM SERPL-SCNC: 138 MMOL/L (ref 134–144)
TOTAL CO2: 27 MMOL/L (ref 18–28)
TRIGL SERPL-MCNC: 165 MG/DL (ref 0–149)
TSH BLD-ACNC: 2.45 UIU/ML (ref 0.45–4.5)
VLDLC SERPL CALC-MCNC: 33 MG/DL (ref 5–40)

## 2017-11-16 ASSESSMENT — ASTHMA QUESTIONNAIRES: ACT_TOTALSCORE: 13

## 2017-11-16 ASSESSMENT — ANXIETY QUESTIONNAIRES: GAD7 TOTAL SCORE: 1

## 2017-11-17 ENCOUNTER — TRANSFERRED RECORDS (OUTPATIENT)
Dept: FAMILY MEDICINE | Facility: CLINIC | Age: 57
End: 2017-11-17

## 2017-12-06 DIAGNOSIS — I10 ESSENTIAL HYPERTENSION WITH GOAL BLOOD PRESSURE LESS THAN 130/80: ICD-10-CM

## 2017-12-06 RX ORDER — LISINOPRIL AND HYDROCHLOROTHIAZIDE 12.5; 2 MG/1; MG/1
TABLET ORAL
Qty: 30 TABLET | Refills: 11 | Status: SHIPPED | OUTPATIENT
Start: 2017-12-06 | End: 2018-10-29

## 2017-12-06 NOTE — TELEPHONE ENCOUNTER
lisinopril-hydrochlorothiazide (PRINZIDE/ZESTORETIC) 20-12.5 MG   LAST  Med check 11/15/17   last labs(for RX) 11/15/17  Next  appt scheduled =  none  Jackeline Nieto MA    BP Readings from Last 3 Encounters:   11/15/17 116/64   08/01/17 124/76   12/06/16 102/68     Last Basic Metabolic Panel:  Lab Results   Component Value Date     11/15/2017      Lab Results   Component Value Date    POTASSIUM 4.3 11/15/2017     Lab Results   Component Value Date    CHLORIDE 95 11/15/2017     Lab Results   Component Value Date    NEETU 9.5 11/15/2017     No results found for: CO2  Lab Results   Component Value Date    BUN 22 11/15/2017    BUN 23 11/15/2017     Lab Results   Component Value Date    CR 0.94 11/15/2017     Lab Results   Component Value Date    GLC 96 11/15/2017

## 2017-12-27 ENCOUNTER — TRANSFERRED RECORDS (OUTPATIENT)
Dept: FAMILY MEDICINE | Facility: CLINIC | Age: 57
End: 2017-12-27

## 2018-01-12 ENCOUNTER — MYC MEDICAL ADVICE (OUTPATIENT)
Dept: FAMILY MEDICINE | Facility: CLINIC | Age: 58
End: 2018-01-12

## 2018-01-12 NOTE — TELEPHONE ENCOUNTER
Patient sent mychart message in her own chart regarding her daughter, Rosy Bobo -- message was copied and addressed in Rosy's chart.   Davida Jaramillo RN

## 2018-02-08 DIAGNOSIS — J06.9 UPPER RESPIRATORY TRACT INFECTION, UNSPECIFIED TYPE: ICD-10-CM

## 2018-02-08 DIAGNOSIS — R05.9 COUGH: ICD-10-CM

## 2018-02-08 RX ORDER — ALBUTEROL SULFATE 90 UG/1
2 AEROSOL, METERED RESPIRATORY (INHALATION) 4 TIMES DAILY
Qty: 1 INHALER | Refills: 3 | Status: SHIPPED | OUTPATIENT
Start: 2018-02-08 | End: 2018-08-19

## 2018-05-07 DIAGNOSIS — E03.9 ACQUIRED HYPOTHYROIDISM: ICD-10-CM

## 2018-05-07 RX ORDER — LEVOTHYROXINE SODIUM 125 UG/1
TABLET ORAL
Qty: 30 TABLET | Refills: 3 | Status: SHIPPED | OUTPATIENT
Start: 2018-05-07 | End: 2018-08-29

## 2018-08-01 DIAGNOSIS — G25.81 RESTLESS LEG SYNDROME: ICD-10-CM

## 2018-08-01 DIAGNOSIS — D12.6 COLON ADENOMA: ICD-10-CM

## 2018-08-01 DIAGNOSIS — I10 ESSENTIAL HYPERTENSION WITH GOAL BLOOD PRESSURE LESS THAN 130/80: ICD-10-CM

## 2018-08-01 RX ORDER — SERTRALINE HYDROCHLORIDE 100 MG/1
TABLET, FILM COATED ORAL
Qty: 90 TABLET | Refills: 3 | Status: SHIPPED | OUTPATIENT
Start: 2018-08-01 | End: 2019-08-08

## 2018-08-01 RX ORDER — POTASSIUM CHLORIDE 750 MG/1
CAPSULE, EXTENDED RELEASE ORAL
Qty: 180 CAPSULE | Refills: 3 | Status: SHIPPED | OUTPATIENT
Start: 2018-08-01 | End: 2019-07-01

## 2018-08-06 DIAGNOSIS — F32.5 MAJOR DEPRESSION IN COMPLETE REMISSION (H): ICD-10-CM

## 2018-08-07 RX ORDER — BUPROPION HYDROCHLORIDE 300 MG/1
TABLET ORAL
Qty: 90 TABLET | Refills: 1 | Status: SHIPPED | OUTPATIENT
Start: 2018-08-07 | End: 2019-02-07

## 2018-08-15 ENCOUNTER — OFFICE VISIT (OUTPATIENT)
Dept: FAMILY MEDICINE | Facility: CLINIC | Age: 58
End: 2018-08-15

## 2018-08-15 VITALS
BODY MASS INDEX: 25.39 KG/M2 | HEART RATE: 72 BPM | SYSTOLIC BLOOD PRESSURE: 118 MMHG | RESPIRATION RATE: 12 BRPM | WEIGHT: 158 LBS | DIASTOLIC BLOOD PRESSURE: 80 MMHG | HEIGHT: 66 IN

## 2018-08-15 DIAGNOSIS — R23.3 EASY BRUISING: ICD-10-CM

## 2018-08-15 DIAGNOSIS — J30.89 CHRONIC NONSEASONAL ALLERGIC RHINITIS DUE TO POLLEN: ICD-10-CM

## 2018-08-15 DIAGNOSIS — J33.9 NASAL POLYP: ICD-10-CM

## 2018-08-15 DIAGNOSIS — Z87.42 HISTORY OF MENORRHAGIA: ICD-10-CM

## 2018-08-15 DIAGNOSIS — E03.4 HYPOTHYROIDISM DUE TO ACQUIRED ATROPHY OF THYROID: ICD-10-CM

## 2018-08-15 DIAGNOSIS — J01.00 ACUTE NON-RECURRENT MAXILLARY SINUSITIS: ICD-10-CM

## 2018-08-15 DIAGNOSIS — J45.20 INTERMITTENT ASTHMA, UNSPECIFIED ASTHMA SEVERITY, UNSPECIFIED WHETHER COMPLICATED: Primary | ICD-10-CM

## 2018-08-15 PROBLEM — E03.8 OTHER SPECIFIED HYPOTHYROIDISM: Status: RESOLVED | Noted: 2017-11-15 | Resolved: 2018-08-15

## 2018-08-15 LAB
% GRANULOCYTES: 64.7 % (ref 42.2–75.2)
HCT VFR BLD AUTO: 42.4 % (ref 35–46)
HEMOGLOBIN: 13.9 G/DL (ref 11.8–15.5)
LYMPHOCYTES NFR BLD AUTO: 28.7 % (ref 20.5–51.1)
MCH RBC QN AUTO: 28.6 PG (ref 27–31)
MCHC RBC AUTO-ENTMCNC: 32.8 G/DL (ref 33–37)
MCV RBC AUTO: 87 FL (ref 80–100)
MONOCYTES NFR BLD AUTO: 6.6 % (ref 1.7–9.3)
PLATELET # BLD AUTO: 341 K/UL (ref 140–450)
RBC # BLD AUTO: 4.88 X10/CMM (ref 3.7–5.2)
WBC # BLD AUTO: 7 X10/CMM (ref 3.8–11)

## 2018-08-15 PROCEDURE — 36415 COLL VENOUS BLD VENIPUNCTURE: CPT | Performed by: FAMILY MEDICINE

## 2018-08-15 PROCEDURE — 85025 COMPLETE CBC W/AUTO DIFF WBC: CPT | Performed by: FAMILY MEDICINE

## 2018-08-15 PROCEDURE — 84443 ASSAY THYROID STIM HORMONE: CPT | Mod: 90 | Performed by: FAMILY MEDICINE

## 2018-08-15 PROCEDURE — 99214 OFFICE O/P EST MOD 30 MIN: CPT | Performed by: FAMILY MEDICINE

## 2018-08-15 PROCEDURE — 84439 ASSAY OF FREE THYROXINE: CPT | Mod: 90 | Performed by: FAMILY MEDICINE

## 2018-08-15 RX ORDER — CETIRIZINE HYDROCHLORIDE, PSEUDOEPHEDRINE HYDROCHLORIDE 5; 120 MG/1; MG/1
1 TABLET, FILM COATED, EXTENDED RELEASE ORAL 2 TIMES DAILY
Qty: 60 TABLET | Refills: 1 | Status: SHIPPED | OUTPATIENT
Start: 2018-08-15 | End: 2018-12-23

## 2018-08-15 RX ORDER — CLINDAMYCIN HCL 300 MG
300 CAPSULE ORAL 4 TIMES DAILY
COMMUNITY
Start: 2018-08-07 | End: 2019-03-05

## 2018-08-15 ASSESSMENT — ANXIETY QUESTIONNAIRES
GAD7 TOTAL SCORE: 11
7. FEELING AFRAID AS IF SOMETHING AWFUL MIGHT HAPPEN: NOT AT ALL
2. NOT BEING ABLE TO STOP OR CONTROL WORRYING: MORE THAN HALF THE DAYS
6. BECOMING EASILY ANNOYED OR IRRITABLE: SEVERAL DAYS
5. BEING SO RESTLESS THAT IT IS HARD TO SIT STILL: MORE THAN HALF THE DAYS
3. WORRYING TOO MUCH ABOUT DIFFERENT THINGS: MORE THAN HALF THE DAYS
1. FEELING NERVOUS, ANXIOUS, OR ON EDGE: MORE THAN HALF THE DAYS

## 2018-08-15 ASSESSMENT — PATIENT HEALTH QUESTIONNAIRE - PHQ9: 5. POOR APPETITE OR OVEREATING: MORE THAN HALF THE DAYS

## 2018-08-15 NOTE — PROGRESS NOTES
Problem(s) Oriented visit        SUBJECTIVE:                                                    Radha Bobo is a 57 year old female who presents to clinic today for medication check. She takes levothyroxine for hypothyroidism. She is due for labs. No weight change.She occasionally feels hotter than others around her. Occasional palpitations. No tremor.     She is being treated for a tooth infection but thinks it is really coming from her sinuses. Clindamycin made this better, but she is still blowing yellow mucous and has left maxillary face pain.     She gets itchy and when she scratches her legs she gets significant bruising. She shows a picture of circumferential thigh bruising. She had a hysterectomy at age 30, but notes that when she had her menses she had very heavy bleeding and passed large clots. She also notes her gums routinely bleed with tooth brushing.    Problem list, Medication list, Allergies, and Medical/Social/Surgical histories reviewed in [facility] and updated as appropriate.   Additional history: as documented    ROS:  10 point ROS completed and negative except noted above, including Gen, HEENT, CV, Resp, GI, , MS, Neurologic, Psych      Histories:   Patient Active Problem List   Diagnosis     GERD (gastroesophageal reflux disease)     Restless leg syndrome     Colon adenoma     Perioral dermatitis     Anxiety     Major depression in complete remission (H)     Essential hypertension with goal blood pressure less than 130/80     Left foot pain     Chronic vasomotor rhinitis     Intermittent asthma, unspecified asthma severity, unspecified whether complicated     High cholesterol     Hypothyroidism due to acquired atrophy of thyroid     Past Surgical History:   Procedure Laterality Date     HYSTERECTOMY, PAP NO LONGER INDICATED         Social History   Substance Use Topics     Smoking status: Never Smoker     Smokeless tobacco: Never Used     Alcohol use 1.2 oz/week     2 Standard drinks or  "equivalent per week     Family History   Problem Relation Age of Onset     Unknown/Adopted Father      Asthma Mother      Unknown/Adopted Mother            OBJECTIVE:                                                    /80  Pulse 72  Resp 12  Ht 1.676 m (5' 6\")  Wt 71.7 kg (158 lb)  BMI 25.5 kg/m2  Body mass index is 25.5 kg/(m^2).   GENERAL APPEARANCE: Alert, no acute distress  NECK: No adenopathy,masses or thyromegaly  RESP: lungs clear to auscultation   CV: normal rate, regular rhythm, no murmur or gallop  ABDOMEN: soft, no organomegaly, masses or tenderness  MS: extremities normal, no peripheral edema  NEURO: Alert, oriented, speech and mentation normal  PSYCH: mentation appears normal, affect and mood normal   Labs Resulted Today:   Results for orders placed or performed in visit on 08/15/18   TSH ([facility])   Result Value Ref Range    TSH 4.150 0.450 - 4.500 uIU/mL    Narrative    Performed at:  [facility] [address], [place], [place]  [id number]  : Robinson Palma MD, Phone:  [phone]   Thyroxine (T4) Free  Direct  S ([facility])   Result Value Ref Range    T4 Free 1.08 0.82 - 1.77 ng/dL    Narrative    Performed at:  01 - [facility] [address], [place], [place]  [id number]  : Robinson Palma MD, Phone:  [phone]   [place] with Diff/Plt [place]   Result Value Ref Range    WBC x10/cmm 7.0 3.8 - 11.0 x10/cmm    % Lymphocytes 28.7 20.5 - 51.1 %    % Monocytes 6.6 1.7 - 9.3 %    % Granulocytes 64.7 42.2 - 75.2 %    RBC x10/cmm 4.88 3.7 - 5.2 x10/cmm    Hemoglobin 13.9 11.8 - 15.5 g/dl    Hematocrit 42.4 35 - 46 %    MCV 87.0 80 - 100 fL    MCH 28.6 27.0 - 31.0 pg    MCHC 32.8 (A) 33.0 - 37.0 g/dL    Platelet Count 341 140 - 450 K/uL   von Willebrand Profile ([facility])   Result Value Ref Range    Factor VIII Activity 164 (H) 57 - 163 %    von Willebrand Factor (vWF) Ag 155 50 - 200 %    vWF Activity 105 50 - 200 %    Narrative    Performed at:  01 - [facility] " [address]  [id number]  : Kne Camejo MD, Phone:  [phone]   Coag Studies Interp Report ([facility])   Result Value Ref Range    Interpretation Note     Narrative    Performed at:  02 - [facility]  [address] [place] [address]  [id number]  : Abimael Duarte MD, Phone:  [phone]     ASSESSMENT/PLAN:                                                        Radha was seen today for recheck medication, sinus problem and derm problem.    Diagnoses and all orders for this visit:    Intermittent asthma, unspecified asthma severity, unspecified whether complicated    Hypothyroidism due to acquired atrophy of thyroid  -     TSH ([facility])  -     Thyroxine (T4) Free  Direct  S ([facility])    Easy bruising/History of menorrhagia  -     CBC with Diff/Plt ([facility])  -     von Willebrand Profile ([facility])  - Hematology referral is given to guide further work up of her excessive bruising.    Acute non-recurrent maxillary sinusitis  -     amoxicillin-clavulanate (AUGMENTIN) 875-125 MG per tablet; Take 1 tablet by mouth 2 times daily   Follow up if failure to resolve.    Nasal polyp  -     Referral to [facility]    Chronic nonseasonal allergic rhinitis due to pollen  -     cetirizine-pseudoePHEDrine (ZYRTEC-D) 5-120 MG per 12 hr tablet; Take 1 tablet by mouth 2 times daily          There are no Patient Instructions on file for this visit.    The following health maintenance items are reviewed in Epic and correct as of today:  Health Maintenance   Topic Date Due     HIV SCREEN (SYSTEM ASSIGNED)  10/19/1977     ADVANCE DIRECTIVE PLANNING Q5 YRS  10/19/2014     INFLUENZA VACCINE (1) 09/01/2018     ASTHMA ACTION PLAN Q1 YR  11/15/2018     DEPRESSION ACTION PLAN Q1 YR  11/15/2018     ASTHMA CONTROL TEST Q6 MOS  02/15/2019     PHQ-9 Q6 MONTHS  02/15/2019     MAMMO SCREEN Q2 YR (SYSTEM ASSIGNED)  08/02/2019     TETANUS IMMUNIZATION (SYSTEM ASSIGNED)   03/11/2021     LIPID SCREEN Q5 YR FEMALE (SYSTEM ASSIGNED)  11/15/2022     COLONOSCOPY Q5 YR  12/27/2022     HEPATITIS C SCREENING  Completed       Mel Correa MD  [facility]  [phone]    For any issues my office # is [phone]

## 2018-08-15 NOTE — MR AVS SNAPSHOT
"              After Visit Summary   8/15/2018    Radha Bobo    MRN: [id number]           Patient Information     Date Of Birth          1960        Visit Information        Provider Department      8/15/2018 11:30 AM Mel Correa MD [facility]        Today's Diagnoses     Intermittent asthma, unspecified asthma severity, unspecified whether complicated    -  1    Hypothyroidism due to acquired atrophy of thyroid        Easy bruising        History of menorrhagia        Acute non-recurrent maxillary sinusitis        Nasal polyp        Chronic nonseasonal allergic rhinitis due to pollen           Follow-ups after your visit        Additional Services     Referral to [facility]       Referral to [facility]  Phone: [phone]  Fax: [phone]  Reason for referral: CT Sinus                  Your next 10 appointments already scheduled     Aug 23, 2018 12:15 PM CDT   MA SCREENING BILATERAL W/ TRINIDAD with SHBCMA2   [facility] ([facility])    [address] [place] [address], [address]  [facility] [id number] [phone]           Three-dimensional (3D) mammograms are available at [place] locations in [facility], [place], [place], [facility], [place], [place], and [place]. [facility] locations include [place] and [facility] in [place]. Benefits of 3D mammograms include: - Improved rate of cancer detection - Decreases your chance of having to go back for more tests, which means fewer: - \"False-positive\" results (This means that there is an abnormal area but it isn't cancer.) - Invasive testing procedures, such as a biopsy or surgery - Can provide clearer images of the breast if you have dense breast tissue. 3D mammography is an optional exam that anyone can have with a 2D mammogram. It doesn't replace or take the place of a 2D mammogram. 2D mammograms remain an effective screening test for all women.  Not all " "insurance companies cover the cost of a 3D mammogram. Check with your insurance.              Who to contact     If you have questions or need follow up information about today's clinic visit or your schedule please contact [facility] directly at [phone].  Normal or non-critical lab and imaging results will be communicated to you by [employer], letter or phone within 4 business days after the clinic has received the results. If you do not hear from us within 7 days, please contact the clinic through [employer] or phone. If you have a critical or abnormal lab result, we will notify you by phone as soon as possible.  Submit refill requests through [employer] or call your pharmacy and they will forward the refill request to us. Please allow 3 business days for your refill to be completed.          Additional Information About Your Visit        [employer] Information     [employer] gives you secure access to your electronic health record. If you see a primary care provider, you can also send messages to your care team and make appointments. If you have questions, please call your primary care clinic.  If you do not have a primary care provider, please call [phone] and they will assist you.        Care EveryWhere ID     This is your Care EveryWhere ID. This could be used by other organizations to access your [facility] medical records  [id number]        Your Vitals Were     Pulse Respirations Height BMI (Body Mass Index)          72 12 1.676 m (5' 6\") 25.5 kg/m2         Blood Pressure from Last 3 Encounters:   08/15/18 118/80   11/15/17 116/64   08/01/17 124/76    Weight from Last 3 Encounters:   08/15/18 71.7 kg (158 lb)   11/15/17 69.5 kg (153 lb 3.2 oz)   08/01/17 67.9 kg (149 lb 12.8 oz)              We Performed the Following     CBC with Diff/Plt ([facility])     Coag Studies Interp Report ([facility])     [facility] - TEST NOT FOUND     [facility] - TEST NOT FOUND     [facility] - TEST NOT FOUND     [facility]     Thyroxine (T4) Free  Direct  S ([facility])     TSH ([facility])     von Willebrand Profile ([facility])          Today's Medication Changes          These changes are accurate as of 8/15/18 11:59 PM.  If you have any questions, ask your nurse or doctor.               Start taking these medicines.        Dose/Directions    amoxicillin-clavulanate 875-125 MG per tablet   Commonly known as:  AUGMENTIN   Used for:  Acute non-recurrent maxillary sinusitis   Started by:  Mel Correa MD        Dose:  1 tablet   Take 1 tablet by mouth 2 times daily   Quantity:  20 tablet   Refills:  0       cetirizine-pseudoePHEDrine 5-120 MG per 12 hr tablet   Commonly known as:  zyrTEC-D   Used for:  Chronic nonseasonal allergic rhinitis due to pollen   Started by:  Mel Correa MD        Dose:  1 tablet   Take 1 tablet by mouth 2 times daily   Quantity:  60 tablet   Refills:  1            Where to get your medicines      These medications were sent to [place] [id number] [place] [facility] [address] [facility]  [address] [facility] [address]     Phone:  [phone]     albuterol 108 (90 Base) MCG/ACT inhaler    amoxicillin-clavulanate 875-125 MG per tablet         Some of these will need a paper prescription and others can be bought over the counter.  Ask your nurse if you have questions.     Bring a paper prescription for each of these medications     cetirizine-pseudoePHEDrine 5-120 MG per 12 hr tablet                Primary Care Provider Office Phone # Fax #    Mel Correa [phone] [address] [place] [address]        Equal Access to Services     [facility]: Hadii bernard fox hadasho Soomaali, waaxda luqadaha, qaybta kaalmada adeegyaregla, joe payne. So [facility] [phone].    ATENCIÓN: Si habla español, tiene a dumont disposición servicios gratuitos de asistencia lingüística. Llame al [phone].    We comply with  applicable federal civil rights laws and [place] laws. We do not discriminate on the basis of race, color, national origin, age, disability, sex, sexual orientation, or gender identity.            Thank you!     Thank you for choosing [facility]  for your care. Our goal is always to provide you with excellent care. Hearing back from our patients is one way we can continue to improve our services. Please take a few minutes to complete the written survey that you may receive in the mail after your visit with us. Thank you!             Your Updated Medication List - Protect others around you: Learn how to safely use, store and throw away your medicines at www.disposemymeds.org.          This list is accurate as of 8/15/18 11:59 PM.  Always use your most recent med list.                   Brand Name Dispense Instructions for use Diagnosis    albuterol 108 (90 Base) MCG/ACT inhaler    PROAIR HFA/PROVENTIL HFA/VENTOLIN HFA    1 Inhaler    Inhale 2 puffs into the lungs 4 times daily    Intermittent asthma, unspecified asthma severity, unspecified whether complicated       amoxicillin-clavulanate 875-125 MG per tablet    AUGMENTIN    20 tablet    Take 1 tablet by mouth 2 times daily    Acute non-recurrent maxillary sinusitis       buPROPion 300 MG 24 hr tablet    WELLBUTRIN XL    90 tablet    TAKE 1 TABLET (300 MG) BY MOUTH EVERY MORNING    Major depression in complete remission (H)       cetirizine-pseudoePHEDrine 5-120 MG per 12 hr tablet    zyrTEC-D    60 tablet    Take 1 tablet by mouth 2 times daily    Chronic nonseasonal allergic rhinitis due to pollen       cholecalciferol 1000 UNIT tablet    vitamin D3    90 tablet    TAKE 1 TABLET (1,000 UNITS) BY MOUTH DAILY    Low vitamin D level       clindamycin 300 MG capsule    CLEOCIN     Take 300 mg by mouth 4 times daily        conjugated estrogens cream    PREMARIN    42.5 g    Place 42.5 g vaginally twice a week.    Menopausal syndrome (hot flashes)        estradiol 0.5 MG tablet    ESTRACE    90 tablet    Take 1 tablet by mouth daily.    Preventative health care       guaiFENesin-codeine 100-10 MG/5ML Soln solution    ROBITUSSIN AC    120 mL    Take one to two tsp every 4-6 hours prn cough    Cough, Intermittent asthma, unspecified asthma severity, unspecified whether complicated       levothyroxine 125 MCG tablet    SYNTHROID/LEVOTHROID    30 tablet    TAKE ONE TABLET BY MOUTH EVERY DAY **BLOOD WORK REQUIRED FOR FURTHER REFILLS**    Acquired hypothyroidism       lisinopril-hydrochlorothiazide 20-12.5 MG per tablet    PRINZIDE/ZESTORETIC    30 tablet    TAKE 1 TABLET BY MOUTH DAILY    Essential hypertension with goal blood pressure less than 130/80       loratadine-pseudoePHEDrine 5-120 MG per 12 hr tablet    CLARITIN-D 12-hour     Take 1 tablet by mouth 2 times daily as needed for allergies    Chronic nonseasonal allergic rhinitis due to pollen       potassium chloride 10 MEQ CR capsule    KLOR-CON SPRINKLE    180 capsule    TAKE 2 CAPSULES BY MOUTH EVERY DAY    Essential hypertension with goal blood pressure less than 130/80       sertraline 100 MG tablet    ZOLOFT    90 tablet    Take 1 tablet daily    Restless leg syndrome, Colon adenoma       simvastatin 10 MG tablet    ZOCOR    90 tablet    Take 1 tablet (10 mg) by mouth At Bedtime    Encounter for medication refill       triamcinolone 55 MCG/ACT inhaler    NASACORT ALLERGY 24HR    3 Bottle    Spray 2 sprays into both nostrils 2 times daily    Chronic vasomotor rhinitis       ZYRTEC ALLERGY PO      Take  by mouth daily.    GERD (gastroesophageal reflux disease), Restless leg syndrome, Colon adenoma, Rhinitis, Perioral dermatitis, Asthma, Hyperlipidemia LDL goal <130, Depressive disorder, not elsewhere classified, Anxiety, Hypothyroidism, Need for prophylactic vaccination with tetanus-diphtheria (TD)

## 2018-08-16 ENCOUNTER — TRANSFERRED RECORDS (OUTPATIENT)
Dept: FAMILY MEDICINE | Facility: CLINIC | Age: 58
End: 2018-08-16

## 2018-08-16 LAB
T4 FREE SERPL-MCNC: 1.08 NG/DL (ref 0.82–1.77)
TSH BLD-ACNC: 4.15 UIU/ML (ref 0.45–4.5)

## 2018-08-16 ASSESSMENT — ASTHMA QUESTIONNAIRES: ACT_TOTALSCORE: 25

## 2018-08-16 ASSESSMENT — PATIENT HEALTH QUESTIONNAIRE - PHQ9: SUM OF ALL RESPONSES TO PHQ QUESTIONS 1-9: 2

## 2018-08-16 ASSESSMENT — ANXIETY QUESTIONNAIRES: GAD7 TOTAL SCORE: 11

## 2018-08-17 LAB
FACTOR VIII ACTIVITY: 164 % (ref 57–163)
INTERPRETATION: NORMAL
VON WILLEBRAND FACTOR (VWF) AG: 155 % (ref 50–200)
VWF ACTIVITY: 105 % (ref 50–200)

## 2018-08-19 RX ORDER — ALBUTEROL SULFATE 90 UG/1
2 AEROSOL, METERED RESPIRATORY (INHALATION) 4 TIMES DAILY
Qty: 1 INHALER | Refills: 3 | Status: SHIPPED | OUTPATIENT
Start: 2018-08-19 | End: 2021-03-29

## 2018-08-20 LAB — Lab: NORMAL

## 2018-08-22 ENCOUNTER — TELEPHONE (OUTPATIENT)
Dept: FAMILY MEDICINE | Facility: CLINIC | Age: 58
End: 2018-08-22

## 2018-08-22 DIAGNOSIS — R23.3 EASY BRUISING: Primary | ICD-10-CM

## 2018-08-22 DIAGNOSIS — R23.3 ABNORMAL BRUISING: ICD-10-CM

## 2018-08-22 LAB — VWF MULTIMERS: NORMAL

## 2018-08-22 NOTE — TELEPHONE ENCOUNTER
----- Message from Mel Correa MD sent at 8/18/2018 11:50 AM CDT -----  Thickening of the sinuses is consistent with chronic irritation such as allergies. No evidence of sinusitis. The abnormality I saw on the right is likely the right sided septal deviation and nasal septal spur. Recommend treat with antihistamines, Flonase, nasal saline lavage.

## 2018-08-22 NOTE — TELEPHONE ENCOUNTER
----- Message from Mel Correa MD sent at 8/18/2018  8:30 AM CDT -----  Thyroid is normal, continue levothyroxine 125 mcg daily, renewal is given for a year.  Blood counts are normal, including platelets (which help clotting).  Von Willebrand's evaluation is negative. Given the degree of bruising you experience, I would like you to see a hematologist.

## 2018-08-22 NOTE — TELEPHONE ENCOUNTER
Patient informed of results and Dr. Correa's recommendations-- CT sinus and blood tests.   She would like to proceed with hematology referral. Referral sent to [facility] to call patient to schedule. They do have access to [facility] for records. Patient given their contact info also.     Regarding sinus results and symptoms. Patient states did just start the zyrtecD rx Dr. Correa has ordered, does use Janeen Pot regularly and will restart her nasal steroid spray. She will follow up prn.  Davida Jaramillo RN

## 2018-08-23 ENCOUNTER — HOSPITAL ENCOUNTER (OUTPATIENT)
Dept: MAMMOGRAPHY | Facility: CLINIC | Age: 58
Discharge: HOME | End: 2018-08-23
Attending: FAMILY MEDICINE | Admitting: FAMILY MEDICINE
Payer: COMMERCIAL

## 2018-08-23 DIAGNOSIS — Z12.31 VISIT FOR SCREENING MAMMOGRAM: ICD-10-CM

## 2018-08-23 PROCEDURE — 77063 BREAST TOMOSYNTHESIS BI: CPT

## 2018-09-05 NOTE — PROGRESS NOTES
Order(s) created erroneously. Erroneous order ID: [id number]   Order canceled by: GAGANDEEP WOODRUFF   Order cancel date/time: 09/05/2018 8:08 AM

## 2018-09-05 NOTE — PROGRESS NOTES
Order(s) created erroneously. Erroneous order ID: [id number]   Order canceled by: GAGANDEEP WOODRUFF   Order cancel date/time: 09/05/2018 8:08 AM

## 2018-09-05 NOTE — PROGRESS NOTES
Order(s) created erroneously. Erroneous order ID: [id number]   Order canceled by: GAGANDEEP WOODRUFF   Order cancel date/time: 09/05/2018 8:08 AM

## 2018-10-02 DIAGNOSIS — Z76.0 ENCOUNTER FOR MEDICATION REFILL: ICD-10-CM

## 2018-10-02 RX ORDER — SIMVASTATIN 10 MG
10 TABLET ORAL AT BEDTIME
Qty: 90 TABLET | Refills: 3 | Status: SHIPPED | OUTPATIENT
Start: 2018-10-02 | End: 2019-10-25

## 2018-10-29 DIAGNOSIS — I10 ESSENTIAL HYPERTENSION WITH GOAL BLOOD PRESSURE LESS THAN 130/80: ICD-10-CM

## 2018-10-29 RX ORDER — LISINOPRIL AND HYDROCHLOROTHIAZIDE 12.5; 2 MG/1; MG/1
1 TABLET ORAL DAILY
Qty: 90 TABLET | Refills: 3 | Status: SHIPPED | OUTPATIENT
Start: 2018-10-29 | End: 2019-11-11

## 2018-12-17 DIAGNOSIS — J30.1 NON-SEASONAL ALLERGIC RHINITIS DUE TO POLLEN: Primary | ICD-10-CM

## 2018-12-18 RX ORDER — CETIRIZINE HYDROCHLORIDE, PSEUDOEPHEDRINE HYDROCHLORIDE 5; 120 MG/1; MG/1
1 TABLET, FILM COATED, EXTENDED RELEASE ORAL 2 TIMES DAILY
Qty: 60 TABLET | Refills: 0 | Status: SHIPPED | OUTPATIENT
Start: 2018-12-18 | End: 2019-01-17

## 2018-12-23 ENCOUNTER — MYC REFILL (OUTPATIENT)
Dept: FAMILY MEDICINE | Facility: CLINIC | Age: 58
End: 2018-12-23

## 2018-12-23 DIAGNOSIS — Z00.00 PREVENTATIVE HEALTH CARE: ICD-10-CM

## 2018-12-24 RX ORDER — ESTRADIOL 0.5 MG/1
0.5 TABLET ORAL DAILY
Qty: 90 TABLET | Refills: 0 | Status: SHIPPED | OUTPATIENT
Start: 2018-12-24 | End: 2019-03-05

## 2018-12-24 NOTE — TELEPHONE ENCOUNTER
12/23/18 Patient sent [facility] message requesting refill on estradiol tabs. Stating took last tab today.  Last visit 8/2018 with Dr. Correa for asthma, sinus, allergies. Thyroid labs and PHQ9 done also. mammo up-to-date 8/2018.   Last visit prior to 8/2018 was med check in 11/2017 with Dr. Delarosa - lipids, CMP done then. Due again now.   Plan: forwarded request to Dr. Correa for review.  Davida Jaramillo RN

## 2019-01-21 DIAGNOSIS — J30.2 SEASONAL ALLERGIC RHINITIS, UNSPECIFIED TRIGGER: Primary | ICD-10-CM

## 2019-01-21 RX ORDER — CETIRIZINE HYDROCHLORIDE, PSEUDOEPHEDRINE HYDROCHLORIDE 5; 120 MG/1; MG/1
TABLET, FILM COATED, EXTENDED RELEASE ORAL
Qty: 60 TABLET | Refills: 3 | Status: SHIPPED | OUTPATIENT
Start: 2019-01-21 | End: 2019-09-27

## 2019-02-07 DIAGNOSIS — F32.5 MAJOR DEPRESSION IN COMPLETE REMISSION (H): ICD-10-CM

## 2019-02-07 RX ORDER — BUPROPION HYDROCHLORIDE 300 MG/1
TABLET ORAL
Qty: 30 TABLET | Refills: 1 | Status: SHIPPED | OUTPATIENT
Start: 2019-02-07 | End: 2019-03-07

## 2019-03-05 ENCOUNTER — OFFICE VISIT (OUTPATIENT)
Dept: FAMILY MEDICINE | Facility: CLINIC | Age: 59
End: 2019-03-05

## 2019-03-05 VITALS
HEART RATE: 100 BPM | HEIGHT: 66 IN | RESPIRATION RATE: 16 BRPM | SYSTOLIC BLOOD PRESSURE: 126 MMHG | BODY MASS INDEX: 24.91 KG/M2 | DIASTOLIC BLOOD PRESSURE: 88 MMHG | WEIGHT: 155 LBS | OXYGEN SATURATION: 99 %

## 2019-03-05 DIAGNOSIS — N95.1 MENOPAUSAL SYNDROME (HOT FLASHES): ICD-10-CM

## 2019-03-05 DIAGNOSIS — J30.1 SEASONAL ALLERGIC RHINITIS DUE TO POLLEN: ICD-10-CM

## 2019-03-05 DIAGNOSIS — Z00.00 ROUTINE GENERAL MEDICAL EXAMINATION AT A HEALTH CARE FACILITY: Primary | ICD-10-CM

## 2019-03-05 DIAGNOSIS — F32.5 MAJOR DEPRESSION IN COMPLETE REMISSION (H): ICD-10-CM

## 2019-03-05 DIAGNOSIS — E78.00 HIGH CHOLESTEROL: ICD-10-CM

## 2019-03-05 DIAGNOSIS — J45.20 INTERMITTENT ASTHMA, UNSPECIFIED ASTHMA SEVERITY, UNSPECIFIED WHETHER COMPLICATED: ICD-10-CM

## 2019-03-05 DIAGNOSIS — N95.2 ATROPHIC VAGINITIS: ICD-10-CM

## 2019-03-05 DIAGNOSIS — E03.4 HYPOTHYROIDISM DUE TO ACQUIRED ATROPHY OF THYROID: ICD-10-CM

## 2019-03-05 DIAGNOSIS — I10 ESSENTIAL HYPERTENSION WITH GOAL BLOOD PRESSURE LESS THAN 130/80: ICD-10-CM

## 2019-03-05 DIAGNOSIS — Z78.0 POSTMENOPAUSAL STATUS: ICD-10-CM

## 2019-03-05 PROCEDURE — 80053 COMPREHEN METABOLIC PANEL: CPT | Mod: 90 | Performed by: FAMILY MEDICINE

## 2019-03-05 PROCEDURE — 80061 LIPID PANEL: CPT | Mod: 90 | Performed by: FAMILY MEDICINE

## 2019-03-05 PROCEDURE — 36415 COLL VENOUS BLD VENIPUNCTURE: CPT | Performed by: FAMILY MEDICINE

## 2019-03-05 PROCEDURE — 99396 PREV VISIT EST AGE 40-64: CPT | Performed by: FAMILY MEDICINE

## 2019-03-05 PROCEDURE — 84443 ASSAY THYROID STIM HORMONE: CPT | Mod: 90 | Performed by: FAMILY MEDICINE

## 2019-03-05 PROCEDURE — 99214 OFFICE O/P EST MOD 30 MIN: CPT | Mod: 25 | Performed by: FAMILY MEDICINE

## 2019-03-05 RX ORDER — TRIAMCINOLONE ACETONIDE 55 UG/1
2 SPRAY, METERED NASAL DAILY
Qty: 3 BOTTLE | Refills: 3 | Status: SHIPPED | OUTPATIENT
Start: 2019-03-05 | End: 2020-10-12

## 2019-03-05 RX ORDER — MONTELUKAST SODIUM 10 MG/1
10 TABLET ORAL AT BEDTIME
Qty: 90 TABLET | Refills: 3 | Status: SHIPPED | OUTPATIENT
Start: 2019-03-05 | End: 2020-08-24

## 2019-03-05 RX ORDER — ESTRADIOL 0.5 MG/1
0.5 TABLET ORAL DAILY
Qty: 90 TABLET | Refills: 3 | Status: SHIPPED | OUTPATIENT
Start: 2019-03-05 | End: 2020-05-10

## 2019-03-05 ASSESSMENT — ANXIETY QUESTIONNAIRES
7. FEELING AFRAID AS IF SOMETHING AWFUL MIGHT HAPPEN: NOT AT ALL
GAD7 TOTAL SCORE: 0
1. FEELING NERVOUS, ANXIOUS, OR ON EDGE: NOT AT ALL
3. WORRYING TOO MUCH ABOUT DIFFERENT THINGS: NOT AT ALL
6. BECOMING EASILY ANNOYED OR IRRITABLE: NOT AT ALL
2. NOT BEING ABLE TO STOP OR CONTROL WORRYING: NOT AT ALL
IF YOU CHECKED OFF ANY PROBLEMS ON THIS QUESTIONNAIRE, HOW DIFFICULT HAVE THESE PROBLEMS MADE IT FOR YOU TO DO YOUR WORK, TAKE CARE OF THINGS AT HOME, OR GET ALONG WITH OTHER PEOPLE: NOT DIFFICULT AT ALL
5. BEING SO RESTLESS THAT IT IS HARD TO SIT STILL: NOT AT ALL

## 2019-03-05 ASSESSMENT — PATIENT HEALTH QUESTIONNAIRE - PHQ9
5. POOR APPETITE OR OVEREATING: NOT AT ALL
SUM OF ALL RESPONSES TO PHQ QUESTIONS 1-9: 2

## 2019-03-05 ASSESSMENT — MIFFLIN-ST. JEOR: SCORE: 1294.83

## 2019-03-05 NOTE — PROGRESS NOTES
SUBJECTIVE:   CC: Radha Bobo is an 58 year old woman who presents for preventive health visit.   CHRONIC ISSUES TO ADDRESS TODAY:  1. Hypercholesterolemia: taking simvastatin, tolerating well without muscle aches. She follows a low saturated fat diet.  2. HTN: taking lisinopril/HCTZ and tolerates this well. No home cuff. She exercises typically, denies chest chest pain or pressure with exertion. No ankle edema.  3. Depression: stable on combination of sertraline and Wellbutrin, doesn't want to make any changes. No suicidal ideations. Sleep remains an issue, previously using clonazepam for RLS. She would like this renewed. She wakes commonly and then cannot fall back to sleep. She takes her lisinopril/HCTZ at night.  4. Allergies and asthma: suffers year round, often needing Zyrtec-D. Has used montelukast in the past but cannot recall if there was improvement.   5. Menopausal: using combination of oral estrogen and some vaginal estrogen cream. This seems to be working well.     Healthy Habits:    Do you get at least three servings of calcium containing foods daily (dairy, green leafy vegetables, etc.)? yes    Amount of exercise or daily activities, outside of work: 2-3 days/week    Problems taking medications regularly No    Medication side effects: No    Have you had an eye exam in the past two years? yes    Do you see a dentist twice per year? yes    Do you have sleep apnea, excessive snoring or daytime drowsiness?no          Today's PHQ-2 Score: No flowsheet data found.        Social History     Tobacco Use     Smoking status: Never Smoker     Smokeless tobacco: Never Used   Substance Use Topics     Alcohol use: Yes     Alcohol/week: 1.2 oz     Types: 2 Standard drinks or equivalent per week     If you drink alcohol do you typically have >3 drinks per day or >7 drinks per week? No                     Reviewed orders with patient.  Reviewed health maintenance and updated orders accordingly - Yes  Labs  reviewed in EPIC  BP Readings from Last 3 Encounters:   03/05/19 126/88   08/15/18 118/80   11/15/17 116/64    Wt Readings from Last 3 Encounters:   03/05/19 70.3 kg (155 lb)   08/15/18 71.7 kg (158 lb)   11/15/17 69.5 kg (153 lb 3.2 oz)                  Patient Active Problem List   Diagnosis     GERD (gastroesophageal reflux disease)     Restless leg syndrome     Colon adenoma     Perioral dermatitis     Anxiety     Major depression in complete remission (H)     Essential hypertension with goal blood pressure less than 130/80     Left foot pain     Chronic vasomotor rhinitis     Intermittent asthma, unspecified asthma severity, unspecified whether complicated     High cholesterol     Hypothyroidism due to acquired atrophy of thyroid     Past Surgical History:   Procedure Laterality Date     HYSTERECTOMY, PAP NO LONGER INDICATED         Social History     Tobacco Use     Smoking status: Never Smoker     Smokeless tobacco: Never Used   Substance Use Topics     Alcohol use: Yes     Alcohol/week: 1.2 oz     Types: 2 Standard drinks or equivalent per week     Family History   Problem Relation Age of Onset     Unknown/Adopted Father      Asthma Mother      Unknown/Adopted Mother          Current Outpatient Medications   Medication Sig Dispense Refill     [START ON 3/7/2019] conjugated estrogens (PREMARIN) 0.625 MG/GM vaginal cream Place 0.5 g vaginally twice a week 42.5 g 12     estradiol (ESTRACE) 0.5 MG tablet Take 1 tablet (0.5 mg) by mouth daily 90 tablet 3     montelukast (SINGULAIR) 10 MG tablet Take 1 tablet (10 mg) by mouth At Bedtime 90 tablet 3     triamcinolone (NASACORT ALLERGY 24HR) 55 MCG/ACT nasal aerosol Spray 2 sprays into both nostrils daily 3 Bottle 3     albuterol (PROAIR HFA/PROVENTIL HFA/VENTOLIN HFA) 108 (90 Base) MCG/ACT inhaler Inhale 2 puffs into the lungs 4 times daily 1 Inhaler 3     buPROPion (WELLBUTRIN XL) 300 MG 24 hr tablet TAKE 1 TABLET (300 MG) BY MOUTH EVERY MORNING 30 tablet 1      Cetirizine HCl (ZYRTEC ALLERGY PO) Take  by mouth daily.       cetirizine-pseudoePHEDrine ER (ZYRTEC-D) 5-120 MG 12 hr tablet TAKE 1 TABLET TWICE DAILY 60 tablet 3     levothyroxine (SYNTHROID/LEVOTHROID) 125 MCG tablet Take 1 tablet (125 mcg) by mouth daily 90 tablet 3     lisinopril-hydrochlorothiazide (PRINZIDE/ZESTORETIC) 20-12.5 MG per tablet Take 1 tablet by mouth daily 90 tablet 3     potassium chloride (KLOR-CON SPRINKLE) 10 MEQ CR capsule TAKE 2 CAPSULES BY MOUTH EVERY  capsule 3     sertraline (ZOLOFT) 100 MG tablet Take 1 tablet daily 90 tablet 3     simvastatin (ZOCOR) 10 MG tablet Take 1 tablet (10 mg) by mouth At Bedtime 90 tablet 3     VITAMIN D3 1000 UNITS tablet TAKE 1 TABLET (1,000 UNITS) BY MOUTH DAILY 90 tablet 3     Allergies   Allergen Reactions     Biaxin [Clarithromycin]      Demerol      Erythromycin [Akne-Mycin]      Motrin [Aspartame-Ibuprofen]      Recent Labs   Lab Test 11/15/17  1027 07/27/16  1030   * 109*   HDL 74 92   TRIG 165* 72   ALT 16 14   CR 0.94 0.76   POTASSIUM 4.3 4.4            Pertinent mammograms are reviewed under the imaging tab.  History of abnormal Pap smear: NO - age 30- 65 PAP every 3 years recommended     Reviewed and updated as needed this visit by clinical staff  Tobacco  Allergies  Meds         Reviewed and updated as needed this visit by Provider        Past Medical History:   Diagnosis Date     Asthma     while in [place] she had no asthma     Colon adenoma      GERD (gastroesophageal reflux disease)      High cholesterol      Migraines      Perioral dermatitis      Psoriasis 2013     developed in [place]- treated with steroids , just sarna in 2016     Restless leg syndrome      Rhinitis       Past Surgical History:   Procedure Laterality Date     HYSTERECTOMY, PAP NO LONGER INDICATED       Obstetric History     No data available          ROS:  CONSTITUTIONAL: NEGATIVE for fever, chills, change in weight  INTEGUMENTARY/SKIN: NEGATIVE for  "worrisome rashes, moles or lesions  EYES: NEGATIVE for vision changes or irritation  ENT: NEGATIVE for ear, mouth and throat problems  RESP: NEGATIVE for significant cough or SOB  BREAST: NEGATIVE for masses, tenderness or discharge  CV: NEGATIVE for chest pain, palpitations or peripheral edema  GI: NEGATIVE for nausea, abdominal pain, heartburn, or change in bowel habits  : NEGATIVE for unusual urinary or vaginal symptoms. No vaginal bleeding.  MUSCULOSKELETAL: NEGATIVE for significant arthralgias or myalgia  NEURO: NEGATIVE for weakness, dizziness or paresthesias  PSYCHIATRIC: NEGATIVE for changes in mood or affect     OBJECTIVE:   /88   Pulse 100   Resp 16   Ht 1.676 m (5' 6\")   Wt 70.3 kg (155 lb)   SpO2 99%   BMI 25.02 kg/m    EXAM:  GENERAL APPEARANCE: healthy, alert and no distress  EYES: Eyes grossly normal to inspection, PERRL and conjunctivae and sclerae normal  HENT: ear canals and TM's normal, nose and mouth without ulcers or lesions, oropharynx clear and oral mucous membranes moist  NECK: no adenopathy, no asymmetry, masses, or scars and thyroid normal to palpation  RESP: lungs clear to auscultation - no rales, rhonchi or wheezes  BREAST: normal without masses, tenderness or nipple discharge and no palpable axillary masses or adenopathy  CV: regular rate and rhythm, normal S1 S2, no S3 or S4, no murmur, click or rub, no peripheral edema and peripheral pulses strong  ABDOMEN: soft, nontender, no hepatosplenomegaly, no masses and bowel sounds normal  MS: no musculoskeletal defects are noted and gait is age appropriate without ataxia  SKIN: no suspicious lesions or rashes  NEURO: Normal strength and tone, sensory exam grossly normal, mentation intact and speech normal  PSYCH: mentation appears normal and affect normal/bright        ASSESSMENT/PLAN:   Radha was seen today for physical.    Diagnoses and all orders for this visit:    Routine [facility] medical examination at [facility] lillie Perez is a healthy appearing 58 year old female. Imms are UTD, BSE reviewed, mammo referral given. Colonoscopy is UTD.    Seasonal allergic rhinitis due to pollen  -     montelukast (SINGULAIR) 10 MG tablet; Take 1 tablet (10 mg) by mouth At Bedtime  -     triamcinolone (NASACORT ALLERGY 24HR) 55 MCG/ACT nasal aerosol; Spray 2 sprays into both nostrils daily  She is currently needing Zyrtec D routinely. She is instructed to see if adding Singulair allows for better rhinitis control and hopefully less need for the decongestant.    Intermittent asthma, unspecified asthma severity, unspecified whether complicated  -     montelukast (SINGULAIR) 10 MG tablet; Take 1 tablet (10 mg) by mouth At Bedtime  Hopefully adding singulair can reduce the degree of exacerbations.     Hypothyroidism due to acquired atrophy of thyroid  -     TSH ([facility])  Need to verify appropriate dosing of levothyroxine. Euthyroid by symptom report.    Major depression in complete remission (H)  Doing well on combination of sertraline and bupropion. She prefers to not make any changes as this is stable.    Essential hypertension with goal blood pressure less than 130/80  -     Comp. Metabolic Panel (14) ([facility])  Continue prinzide and recommend low salt diet and 150 minutes weekly of cardio exercise.    High cholesterol  -     Lipid Panel ([facility])  -     Comp. Metabolic Panel (14) ([facility])  Continue simvastatin, low saturated fat diet.    Menopausal syndrome (hot flashes) / Atrophic vaginitis  -     estradiol (ESTRACE) 0.5 MG tablet; Take 1 tablet (0.5 mg) by mouth daily  -     conjugated estrogens (PREMARIN) 0.625 MG/GM vaginal cream; Place 0.5 g vaginally twice a week  Doing well on this combination, to continue.    Postmenopausal status  -     DEXA - Hip/Pelvis/Spine (FUTURE/[facility]); Future  Establish baseline bone density, recommend dietary calcium and weight bearing exercise.      COUNSELING:   Reviewed preventive health  "counseling, as reflected in patient instructions       Regular exercise       Healthy diet/nutrition       Vision screening       Hearing screening       Osteoporosis Prevention/Bone Health       Colon cancer screening    BP Readings from Last 1 Encounters:   03/05/19 126/88     Estimated body mass index is 25.02 kg/m  as calculated from the following:    Height as of this encounter: 1.676 m (5' 6\").    Weight as of this encounter: 70.3 kg (155 lb).           reports that  has never smoked. she has never used smokeless tobacco.      Counseling Resources:  ATP IV Guidelines  Pooled Cohorts Equation Calculator  Breast Cancer Risk Calculator  FRAX Risk Assessment  ICSI Preventive Guidelines  Dietary Guidelines for Americans, 2010  USDA's MyPlate  ASA Prophylaxis  Lung CA Screening    Mel Correa MD  [facility]  "

## 2019-03-06 LAB
ALBUMIN SERPL-MCNC: 4.6 G/DL (ref 3.5–5.5)
ALBUMIN/GLOB SERPL: 2.4 {RATIO} (ref 1.2–2.2)
ALP SERPL-CCNC: 64 IU/L (ref 39–117)
ALT SERPL-CCNC: 28 IU/L (ref 0–32)
AST SERPL-CCNC: 29 IU/L (ref 0–40)
BILIRUB SERPL-MCNC: 0.3 MG/DL (ref 0–1.2)
BUN SERPL-MCNC: 16 MG/DL (ref 6–24)
BUN/CREATININE RATIO: 22 (ref 9–23)
CALCIUM SERPL-MCNC: 9.7 MG/DL (ref 8.7–10.2)
CHLORIDE SERPLBLD-SCNC: 99 MMOL/L (ref 96–106)
CHOLEST SERPL-MCNC: 203 MG/DL (ref 100–199)
CREAT SERPL-MCNC: 0.74 MG/DL (ref 0.57–1)
EGFR IF AFRICN AM: 103 ML/MIN/1.73
EGFR IF NONAFRICN AM: 89 ML/MIN/1.73
GLOBULIN, TOTAL: 1.9 G/DL (ref 1.5–4.5)
GLUCOSE SERPL-MCNC: 93 MG/DL (ref 65–99)
HDLC SERPL-MCNC: 83 MG/DL
LDL/HDL RATIO: 1.3 RATIO (ref 0–3.2)
LDLC SERPL CALC-MCNC: 107 MG/DL (ref 0–99)
POTASSIUM SERPL-SCNC: 4.4 MMOL/L (ref 3.5–5.2)
PROT SERPL-MCNC: 6.5 G/DL (ref 6–8.5)
SODIUM SERPL-SCNC: 139 MMOL/L (ref 134–144)
TOTAL CO2: 25 MMOL/L (ref 20–29)
TRIGL SERPL-MCNC: 66 MG/DL (ref 0–149)
TSH BLD-ACNC: 4.99 UIU/ML (ref 0.45–4.5)
VLDLC SERPL CALC-MCNC: 13 MG/DL (ref 5–40)

## 2019-03-06 ASSESSMENT — ANXIETY QUESTIONNAIRES: GAD7 TOTAL SCORE: 0

## 2019-03-06 ASSESSMENT — ASTHMA QUESTIONNAIRES: ACT_TOTALSCORE: 25

## 2019-03-07 RX ORDER — BUPROPION HYDROCHLORIDE 300 MG/1
TABLET ORAL
Qty: 90 TABLET | Refills: 3 | Status: SHIPPED | OUTPATIENT
Start: 2019-03-07 | End: 2020-05-08

## 2019-03-11 ENCOUNTER — HOSPITAL ENCOUNTER (OUTPATIENT)
Dept: BONE DENSITY | Facility: CLINIC | Age: 59
Discharge: HOME | End: 2019-03-11
Attending: FAMILY MEDICINE | Admitting: FAMILY MEDICINE
Payer: COMMERCIAL

## 2019-03-11 ENCOUNTER — TELEPHONE (OUTPATIENT)
Dept: FAMILY MEDICINE | Facility: CLINIC | Age: 59
End: 2019-03-11

## 2019-03-11 DIAGNOSIS — Z78.0 POSTMENOPAUSAL STATUS: ICD-10-CM

## 2019-03-11 DIAGNOSIS — E03.9 HYPOTHYROIDISM: Primary | ICD-10-CM

## 2019-03-11 PROCEDURE — 77080 DXA BONE DENSITY AXIAL: CPT

## 2019-03-11 NOTE — TELEPHONE ENCOUNTER
Called patient with lab results.  Informed patient of normal labs- glucose, electrolytes, kidney function, and liver function.  Lipids have improved and patient was informed to stay on her simvastatin.  TSH mildly elevated and patient says she prefers  to not increase her levothyroxine yet.  She will observe and bebeto labs in 3 months.

## 2019-04-12 ENCOUNTER — TRANSFERRED RECORDS (OUTPATIENT)
Dept: FAMILY MEDICINE | Facility: CLINIC | Age: 59
End: 2019-04-12

## 2019-07-01 DIAGNOSIS — I10 ESSENTIAL HYPERTENSION WITH GOAL BLOOD PRESSURE LESS THAN 130/80: ICD-10-CM

## 2019-07-01 RX ORDER — POTASSIUM CHLORIDE 750 MG/1
CAPSULE, EXTENDED RELEASE ORAL
Qty: 180 CAPSULE | Refills: 3 | Status: SHIPPED | OUTPATIENT
Start: 2019-07-01 | End: 2020-07-01

## 2019-08-08 DIAGNOSIS — G25.81 RESTLESS LEG SYNDROME: ICD-10-CM

## 2019-08-08 DIAGNOSIS — D12.6 COLON ADENOMA: ICD-10-CM

## 2019-08-09 RX ORDER — SERTRALINE HYDROCHLORIDE 100 MG/1
TABLET, FILM COATED ORAL
Qty: 90 TABLET | Refills: 2 | Status: SHIPPED | OUTPATIENT
Start: 2019-08-09 | End: 2020-05-08

## 2019-08-28 DIAGNOSIS — E03.9 HYPOTHYROIDISM: ICD-10-CM

## 2019-08-28 PROCEDURE — 36415 COLL VENOUS BLD VENIPUNCTURE: CPT | Performed by: FAMILY MEDICINE

## 2019-08-28 PROCEDURE — 84443 ASSAY THYROID STIM HORMONE: CPT | Mod: 90 | Performed by: FAMILY MEDICINE

## 2019-08-29 LAB — TSH BLD-ACNC: 1.37 UIU/ML (ref 0.45–4.5)

## 2019-09-27 ENCOUNTER — TELEPHONE (OUTPATIENT)
Dept: FAMILY MEDICINE | Facility: CLINIC | Age: 59
End: 2019-09-27

## 2019-09-27 DIAGNOSIS — J30.2 SEASONAL ALLERGIC RHINITIS, UNSPECIFIED TRIGGER: ICD-10-CM

## 2019-09-27 RX ORDER — CETIRIZINE HYDROCHLORIDE, PSEUDOEPHEDRINE HYDROCHLORIDE 5; 120 MG/1; MG/1
TABLET, FILM COATED, EXTENDED RELEASE ORAL
Qty: 60 TABLET | Refills: 1 | COMMUNITY
Start: 2019-09-27 | End: 2020-06-10

## 2019-09-27 NOTE — TELEPHONE ENCOUNTER
Patient called clinic requesting prescription for Zyrtec-D. She uses PRN for allergies. Per Kayla Rouse CNP prescription for this called into pharmacy. Nona Savage

## 2019-10-02 DIAGNOSIS — E03.9 ACQUIRED HYPOTHYROIDISM: ICD-10-CM

## 2019-10-02 RX ORDER — LEVOTHYROXINE SODIUM 125 UG/1
125 TABLET ORAL DAILY
Qty: 90 TABLET | Refills: 3 | Status: SHIPPED | OUTPATIENT
Start: 2019-10-02 | End: 2020-08-24

## 2019-10-02 NOTE — TELEPHONE ENCOUNTER
Refill medication Levothyroxine  LOV 03/052019  Labs 08/28/2019  Elvira Zurita MA on 10/2/2019 at 3:20 PM

## 2019-10-23 ENCOUNTER — OFFICE VISIT (OUTPATIENT)
Dept: FAMILY MEDICINE | Facility: CLINIC | Age: 59
End: 2019-10-23

## 2019-10-23 VITALS
OXYGEN SATURATION: 97 % | BODY MASS INDEX: 25.82 KG/M2 | HEART RATE: 94 BPM | RESPIRATION RATE: 16 BRPM | WEIGHT: 160 LBS | DIASTOLIC BLOOD PRESSURE: 78 MMHG | SYSTOLIC BLOOD PRESSURE: 124 MMHG

## 2019-10-23 DIAGNOSIS — F41.1 ANXIETY IN ACUTE STRESS REACTION: Primary | ICD-10-CM

## 2019-10-23 DIAGNOSIS — F43.0 ANXIETY IN ACUTE STRESS REACTION: Primary | ICD-10-CM

## 2019-10-23 PROCEDURE — 99213 OFFICE O/P EST LOW 20 MIN: CPT | Performed by: FAMILY MEDICINE

## 2019-10-23 RX ORDER — ALPRAZOLAM 1 MG
1 TABLET ORAL
Qty: 30 TABLET | Refills: 0 | Status: SHIPPED | OUTPATIENT
Start: 2019-10-23 | End: 2020-01-24

## 2019-10-23 NOTE — PATIENT INSTRUCTIONS
I have referred you to a grief therapist group, please follow up with them immediately. Take the alprazolam to help with extreme distress and to help you sleep. Do not drink any alcohol while taking this medication.       [facility]  [address] [place] [address]  [address]  [place] [address]  Call Ellen Goldstein  [phone]    Please let them know you would like to make an appointment to discuss grief and loss and let them know how recent your loss was. .

## 2019-10-23 NOTE — PROGRESS NOTES
Problem(s) Oriented visit        SUBJECTIVE:                                                    Radha Bobo is a 59 year old female who presents to clinic today for the following health issues :    Radha presents in the office with acute anxiety and distress. He son  a few days ago. She reports she has not been able to sleep and she is overcome by grief. She reports she is not having suicidal thoughts and reports protective factors such as her family yo take care of. She is future oriented. She is requesting a medication to help with anxiety and sleep.     Problem list, Medication list, Allergies, and Medical/Social/Surgical histories reviewed in [facility] and updated as appropriate.   Additional history: as documented    ROS:  General:  NEGATIVE for fever, chills, change in weight Psychiatric: POSITIVE for:, anxiety, agitation, acute grief    Histories:   Patient Active Problem List   Diagnosis     GERD (gastroesophageal reflux disease)     Restless leg syndrome     Colon adenoma     Perioral dermatitis     Anxiety     Major depression in complete remission (H)     Essential hypertension with goal blood pressure less than 130/80     Left foot pain     Chronic vasomotor rhinitis     Intermittent asthma, unspecified asthma severity, unspecified whether complicated     High cholesterol     Hypothyroidism due to acquired atrophy of thyroid     Past Surgical History:   Procedure Laterality Date     HYSTERECTOMY, PAP NO LONGER INDICATED         Social History     Tobacco Use     Smoking status: Never Smoker     Smokeless tobacco: Never Used   Substance Use Topics     Alcohol use: Yes     Alcohol/week: 2.0 standard drinks     Types: 2 Standard drinks or equivalent per week     Family History   Problem Relation Age of Onset     Unknown/Adopted Father      Asthma Mother      Unknown/Adopted Mother            OBJECTIVE:                                                    /78   Pulse 94   Resp 16   Wt 72.6 kg (160  lb)   SpO2 97%   BMI 25.82 kg/m    Body mass index is 25.82 kg/m .   GENERAL: healthy, severe distress and fatigued  EYES: Eyes grossly normal to inspection, PERRL and conjunctivae and sclerae normal  NEURO: Normal strength and tone, mentation intact and speech normal  PSYCH: mentation appears normal, tearful, fatigued and judgement and insight intact     ASSESSMENT/PLAN:                                                        Radha was seen today for consult.    Diagnoses and all orders for this visit:    Anxiety in acute stress reaction  -     ALPRAZolam (XANAX) 1 MG tablet; Take 1 tablet (1 mg) by mouth nightly as needed for anxiety or sleep    I have referred you to a grief therapist group, please follow up with them immediately. Take the alprazolam to help with extreme distress and to help you sleep. Do not drink any alcohol while taking this medication. Please follow up with the clinic if you are feeling more depressed or if you have suicidal thoughts.     ASSESSMENT/PLAN:       The following health maintenance items are reviewed in Epic and correct as of today:  Health Maintenance   Topic Date Due     ADVANCE CARE PLANNING  1960     HIV SCREENING  10/19/1974     ASTHMA ACTION PLAN  11/15/2018     ASTHMA CONTROL TEST  09/05/2019     PHQ-9  09/05/2019     ZOSTER IMMUNIZATION (2 of 2) 10/22/2019     PREVENTIVE CARE VISIT  03/05/2020     MAMMO SCREENING  08/23/2020     DTAP/TDAP/TD IMMUNIZATION (2 - Td) 03/11/2021     COLONOSCOPY  12/27/2022     LIPID  03/05/2024     HEPATITIS C SCREENING  Completed     DEPRESSION ACTION PLAN  Completed     INFLUENZA VACCINE  Completed     IPV IMMUNIZATION  Aged Out     MENINGITIS IMMUNIZATION  Aged Out       Kayla Rouse NP  [facility]  Family Practice  [facility]  [phone]    For any issues my office # is [phone]

## 2019-10-25 DIAGNOSIS — Z76.0 ENCOUNTER FOR MEDICATION REFILL: ICD-10-CM

## 2019-10-25 RX ORDER — SIMVASTATIN 10 MG
10 TABLET ORAL AT BEDTIME
Qty: 90 TABLET | Refills: 3 | Status: SHIPPED | OUTPATIENT
Start: 2019-10-25 | End: 2020-08-24

## 2019-11-07 DIAGNOSIS — I10 ESSENTIAL HYPERTENSION WITH GOAL BLOOD PRESSURE LESS THAN 130/80: ICD-10-CM

## 2019-11-11 ENCOUNTER — TELEPHONE (OUTPATIENT)
Dept: FAMILY MEDICINE | Facility: CLINIC | Age: 59
End: 2019-11-11

## 2019-11-11 DIAGNOSIS — R05.9 COUGH: Primary | ICD-10-CM

## 2019-11-11 RX ORDER — CODEINE PHOSPHATE AND GUAIFENESIN 10; 100 MG/5ML; MG/5ML
1-2 SOLUTION ORAL EVERY 4 HOURS PRN
Qty: 180 ML | Refills: 0 | COMMUNITY
Start: 2019-11-11 | End: 2020-05-08

## 2019-11-11 RX ORDER — LISINOPRIL AND HYDROCHLOROTHIAZIDE 12.5; 2 MG/1; MG/1
1 TABLET ORAL DAILY
Qty: 90 TABLET | Refills: 3 | Status: SHIPPED | OUTPATIENT
Start: 2019-11-11 | End: 2020-08-24

## 2019-11-11 NOTE — TELEPHONE ENCOUNTER
Patient calls complains of 4 days of URI symptoms - cough (loosening- minor yellowish sputum), sore throat, head congestion. Denies fever, chills, aches, chest congestion, wheeze, dyspnea.   Primary complaint is cough during night. States OTC cough syrups do not work for her. RobAC works well for her. Had bottle from 2017 she has been using but took last dose of it last night. Requests rx for this and if symptoms worsen or persist will call to make appt for eval.     Last visit in clinic was 10/23/19 with Kayla Rouse CNP for stress/grief.     Plan: ok per Kayla Rouse CNP for Marciano AC #180ml. RTC if symptoms fail to resolve. Patient agrees. Rx phoned to pharmacy.   Davida Jaramillo RN

## 2019-11-15 ENCOUNTER — OFFICE VISIT (OUTPATIENT)
Dept: FAMILY MEDICINE | Facility: CLINIC | Age: 59
End: 2019-11-15

## 2019-11-15 VITALS
WEIGHT: 152 LBS | HEART RATE: 91 BPM | HEIGHT: 66 IN | SYSTOLIC BLOOD PRESSURE: 116 MMHG | BODY MASS INDEX: 24.43 KG/M2 | RESPIRATION RATE: 16 BRPM | DIASTOLIC BLOOD PRESSURE: 80 MMHG | OXYGEN SATURATION: 96 %

## 2019-11-15 DIAGNOSIS — J18.9 PNEUMONIA OF BOTH LOWER LOBES DUE TO INFECTIOUS ORGANISM: Primary | ICD-10-CM

## 2019-11-15 PROCEDURE — 99213 OFFICE O/P EST LOW 20 MIN: CPT | Performed by: FAMILY MEDICINE

## 2019-11-15 RX ORDER — AZITHROMYCIN 250 MG/1
TABLET, FILM COATED ORAL
Qty: 6 TABLET | Refills: 0 | Status: SHIPPED | OUTPATIENT
Start: 2019-11-15 | End: 2020-01-06

## 2019-11-15 ASSESSMENT — MIFFLIN-ST. JEOR: SCORE: 1276.22

## 2019-11-15 NOTE — PROGRESS NOTES
Problem(s) Oriented visit        SUBJECTIVE:                                                    Radha Bobo is a 59 year old female who presents to clinic today for the following health issues :      Concern - Chest Congestion  Onset: 1 week    Description:   Chest congestion    Intensity: moderate    Progression of Symptoms:  worsening    Accompanying Signs & Symptoms:  Fatigue    Previous history of similar problem:   yes    Precipitating factors:   Worsened by: deep breath    Alleviating factors:  Improved by: None    Therapies Tried and outcome: Rescue inhaler does not help much. Tussin with Codeine not helping much either.       Problem list, Medication list, Allergies, and Medical/Social/Surgical histories reviewed in [facility] and updated as appropriate.   Additional history: as documented    ROS:  General:  POSITIVE  for anorexia, chills, fatigue and fever (temp unknown) HEENT:  No changes in hearing or vision, no nose bleeds or other nasal problems and Negative for frequent or significant headaches CV:  NEGATIVE for chest pain, palpitations or peripheral edema Resp:  POSITIVE for cough-non productive, cough-productive and SOB/dyspnea    Histories:   Patient Active Problem List   Diagnosis     GERD (gastroesophageal reflux disease)     Restless leg syndrome     Colon adenoma     Perioral dermatitis     Anxiety     Major depression in complete remission (H)     Essential hypertension with goal blood pressure less than 130/80     Left foot pain     Chronic vasomotor rhinitis     Intermittent asthma, unspecified asthma severity, unspecified whether complicated     High cholesterol     Hypothyroidism due to acquired atrophy of thyroid     Past Surgical History:   Procedure Laterality Date     HYSTERECTOMY, PAP NO LONGER INDICATED         Social History     Tobacco Use     Smoking status: Never Smoker     Smokeless tobacco: Never Used   Substance Use Topics     Alcohol use: Yes     Alcohol/week: 2.0 standard drinks  "    Types: 2 Standard drinks or equivalent per week     Family History   Problem Relation Age of Onset     Unknown/Adopted Father      Asthma Mother      Unknown/Adopted Mother            OBJECTIVE:                                                    /80   Pulse 91   Resp 16   Ht 1.676 m (5' 6\")   Wt 68.9 kg (152 lb)   SpO2 96%   BMI 24.53 kg/m    Body mass index is 24.53 kg/m .   GENERAL: alert and fatigued  EYES: Eyes grossly normal to inspection, PERRL and conjunctivae and sclerae normal  HENT: ear canals and TM's normal, nose and mouth without ulcers or lesions  NECK: no adenopathy, no asymmetry, masses, or scars and thyroid normal to palpation  RESP: rhonchi bibasilar  CV: regular rate and rhythm, normal S1 S2, no S3 or S4, no murmur, click or rub, no peripheral edema and peripheral pulses strong  NEURO: Normal strength and tone, mentation intact and speech normal  PSYCH: mentation appears normal, affect normal/bright     ASSESSMENT/PLAN:                                                        Diagnoses and all orders for this visit:    Pneumonia of both lower lobes due to infectious organism (H)  -     azithromycin (ZITHROMAX) 250 MG tablet; Take 2 tablets (500 mg) by mouth daily for 1 day, THEN 1 tablet (250 mg) daily for 4 days.    Please follow up if your symptoms have not completely resolved after finishing the antibiotics or if your symptoms become worse at any time.     ASSESSMENT/PLAN:       The following health maintenance items are reviewed in Epic and correct as of today:  Health Maintenance   Topic Date Due     ADVANCE CARE PLANNING  1960     HIV SCREENING  10/19/1974     ASTHMA ACTION PLAN  11/15/2018     ASTHMA CONTROL TEST  09/05/2019     PHQ-9  09/05/2019     PREVENTIVE CARE VISIT  03/05/2020     MAMMO SCREENING  08/23/2020     DTAP/TDAP/TD IMMUNIZATION (2 - Td) 03/11/2021     COLONOSCOPY  12/27/2022     LIPID  03/05/2024     HEPATITIS C SCREENING  Completed     DEPRESSION ACTION " PLAN  Completed     INFLUENZA VACCINE  Completed     ZOSTER IMMUNIZATION  Completed     IPV IMMUNIZATION  Aged Out     MENINGITIS IMMUNIZATION  Aged Out       Kayla Rouse NP  [facility]  [phone]    For any issues my office # is [phone]

## 2019-11-27 NOTE — PATIENT INSTRUCTIONS
Patient Education     Treating Pneumonia  Pneumonia is an infection of one or both of the lungs. Pneumonia:    Is usually caused by either a virus or a bacteria    Can be very serious, especially in infants, young children, and older adults. It s also serious for those with other long-term health problems or weakened immune systems.    Is sometimes treated at home and sometimes in the hospital    Antibiotic medicines  Antibiotics may be prescribed for pneumonia caused by bacteria. They may be pills (oral medicines), or shots (injections). Or they may be given by IV (intravenously) into a vein. If you are taking oral medicines at home:    Fill your prescription and start taking your medicine as soon as you can.    You will likely start to feel better in a day or 2, but don t stop taking the antibiotic.    Use a pill organizer to help you remember to take your medicine.    Let your healthcare provider know if you have side effects.    Take your medicine exactly as directed on the label. Talk to your provider or pharmacist if you have any questions.  Antiviral medicines  Antiviral medicine may be prescribed for pneumonia caused by a virus. For example, antiviral medicine may be prescribed for pneumonia caused by the flu virus. Antibiotics do not work against viruses. If you are taking antiviral medicine at home:    Fill your prescription and start taking your medicine as soon as you can.    Talk with your provider or pharmacist about possible side effects.    Take the medicine exactly as instructed.  To relieve symptoms  There are many medicines that can help relieve symptoms of pneumonia. Some are prescription and some are over-the-counter.  Your healthcare provider may recommend:    Acetaminophen or ibuprofen to lower your fever and to lessen headache or other pain    Cough medicine to loosen mucus or to reduce coughing  Make sure you check with your healthcare provider or pharmacist before taking any  over-the-counter medicines.  Special treatments  If you are hospitalized for pneumonia, you may have other therapies, including:    Inhaled medicines to help with breathing or chest congestion    Supplemental oxygen to increase low oxygen levels  Drink fluids and eat healthy  You should eat healthy to help your body fight the infection. Drinking a lot of fluids helps to replace fluids lost from fever and to loosen mucus in your chest.    Diet. Make healthy food choices, including fruits and vegetables, lean meats and other proteins, 100% whole grain and low- or no-fat dairy products.    Fluids. Drink at least 6 to 8 tall glasses a day. Water and 100% fruit or vegetable juice are best.  Get plenty of rest and sleep  You may be more tired than usual for a while. It is important to get enough sleep at night. It s also important to rest during the day. Talk with your healthcare provider if coughing or other symptoms are interfering with your sleep.  Preventing the spread of germs  The best thing you can do to prevent spreading germs is to wash your hands often. You should:    Rub your hand with soap and water for 20 to 30 seconds.    Clean in between your fingers, the backs of your hands, and around your nails.    Dry your hands on a separate towel or use paper towels.  You should also:    Keep alcohol-based hand  nearby.    Make sure you also clean surfaces that you touch. Use a product that kills all types of germs.    Stay away from others until you are feeling better.  When to call your healthcare provider  Call your healthcare provider if you have any of the following:    Symptoms get worse    Fever continues    Shortness of breath gets worse    Increased mucus or mucus that is darker in color    Coughing gets worse    Lips or fingers are bluish in color    Side effects from your medicine   Date Last Reviewed: 12/1/2016 2000-2018 [employer]. [address] [place] [address], [place] [address]. All  rights reserved. This information is not intended as a substitute for professional medical care. Always follow your healthcare professional's instructions.

## 2019-12-02 ENCOUNTER — OFFICE VISIT (OUTPATIENT)
Dept: FAMILY MEDICINE | Facility: CLINIC | Age: 59
End: 2019-12-02

## 2019-12-02 VITALS
BODY MASS INDEX: 25.74 KG/M2 | RESPIRATION RATE: 16 BRPM | OXYGEN SATURATION: 99 % | DIASTOLIC BLOOD PRESSURE: 78 MMHG | HEIGHT: 67 IN | SYSTOLIC BLOOD PRESSURE: 118 MMHG | WEIGHT: 164 LBS | HEART RATE: 80 BPM

## 2019-12-02 DIAGNOSIS — M79.671 RIGHT FOOT PAIN: Primary | ICD-10-CM

## 2019-12-02 PROCEDURE — 99213 OFFICE O/P EST LOW 20 MIN: CPT | Mod: 25 | Performed by: FAMILY MEDICINE

## 2019-12-02 PROCEDURE — 73630 X-RAY EXAM OF FOOT: CPT | Performed by: FAMILY MEDICINE

## 2019-12-02 RX ORDER — IBUPROFEN 800 MG/1
800 TABLET, FILM COATED ORAL EVERY 8 HOURS PRN
Qty: 30 TABLET | Refills: 0 | Status: SHIPPED | OUTPATIENT
Start: 2019-12-02 | End: 2020-05-08

## 2019-12-02 ASSESSMENT — MIFFLIN-ST. JEOR: SCORE: 1338.59

## 2019-12-02 NOTE — LETTER
December 2, 2019      Radha Bobo  [address] [place] [address]        To Whom It May Concern:    Radha Bobo  was seen on 12/02/2019, she has an injury to her right foot that will limit her mobility for the next 6-8 weeks. Please make reasonable accommodations and help Radha to recover safely. Please excuse her from any activity for the next 72 hours due to injury.        Sincerely,        Kayla Rouse NP

## 2019-12-02 NOTE — PROGRESS NOTES
Problem(s) Oriented visit        SUBJECTIVE:                                                    Radha Bobo is a 59 year old female who presents to clinic today for the following health issues :  Radha presents with foot and ankle pain and swelling since Saturday evening (two days ago) when she tripped on the stairs of her home and rolled her ankle. There is bruising and swelling on her dorsal foot the bottom of her foot, her toes and her ankle. There is swelling onb the leteral ankle. She has pain when putting any pressure on her foot. And she can not bear weight on this foot.       Problem list, Medication list, Allergies, and Medical/Social/Surgical histories reviewed in [facility] and updated as appropriate.   Additional history: as documented    ROS:  General:  NEGATIVE for fever, chills, change in weight Musculoskeletal:  POSITIVE  for:, injury to right foot and ankle.    Histories:   Patient Active Problem List   Diagnosis     GERD (gastroesophageal reflux disease)     Restless leg syndrome     Colon adenoma     Perioral dermatitis     Anxiety     Major depression in complete remission (H)     Essential hypertension with goal blood pressure less than 130/80     Left foot pain     Chronic vasomotor rhinitis     Intermittent asthma, unspecified asthma severity, unspecified whether complicated     High cholesterol     Hypothyroidism due to acquired atrophy of thyroid     Past Surgical History:   Procedure Laterality Date     HYSTERECTOMY, PAP NO LONGER INDICATED         Social History     Tobacco Use     Smoking status: Never Smoker     Smokeless tobacco: Never Used   Substance Use Topics     Alcohol use: Yes     Alcohol/week: 2.0 standard drinks     Types: 2 Standard drinks or equivalent per week     Family History   Problem Relation Age of Onset     Unknown/Adopted Father      Asthma Mother      Unknown/Adopted Mother            OBJECTIVE:                                                    /78   Pulse 80  "  Resp 16   Ht 1.689 m (5' 6.5\")   Wt 74.4 kg (164 lb)   SpO2 99%   BMI 26.07 kg/m    Body mass index is 26.07 kg/m .   GENERAL: healthy, alert and no distress  EYES: Eyes grossly normal to inspection, PERRL and conjunctivae and sclerae normal  MS: RLE exam shows Bruising to planter and dorsal foot, swelling to lateral ankle and dorsal foot.   NEURO: Normal strength and tone, mentation intact and speech normal     ASSESSMENT/PLAN:                                                        Diagnoses and all orders for this visit:    Right foot pain  -     XR Foot Right G/E 3 Views  -     ibuprofen (ADVIL/MOTRIN) 800 MG tablet; Take 1 tablet (800 mg) by mouth every 8 hours as needed for moderate pain    X-ray shows fracture to second and third distal metatarsal bones. Ankle is likely sprained. I have referred you to [facility] for follow up.      ASSESSMENT/PLAN:       The following health maintenance items are reviewed in Epic and correct as of today:  Health Maintenance   Topic Date Due     ADVANCE CARE PLANNING  1960     HIV SCREENING  10/19/1974     ASTHMA ACTION PLAN  11/15/2018     ASTHMA CONTROL TEST  09/05/2019     PHQ-9  09/05/2019     PREVENTIVE CARE VISIT  03/05/2020     MAMMO SCREENING  08/23/2020     DTAP/TDAP/TD IMMUNIZATION (2 - Td) 03/11/2021     COLONOSCOPY  12/27/2022     LIPID  03/05/2024     HEPATITIS C SCREENING  Completed     DEPRESSION ACTION PLAN  Completed     INFLUENZA VACCINE  Completed     ZOSTER IMMUNIZATION  Completed     IPV IMMUNIZATION  Aged Out     MENINGITIS IMMUNIZATION  Aged Out       Kayla Rouse NP  [facility]  Family Practice  [facility]  [phone]    For any issues my office # is [phone]        "

## 2020-01-06 ENCOUNTER — OFFICE VISIT (OUTPATIENT)
Dept: FAMILY MEDICINE | Facility: CLINIC | Age: 60
End: 2020-01-06

## 2020-01-06 VITALS
HEART RATE: 90 BPM | SYSTOLIC BLOOD PRESSURE: 122 MMHG | OXYGEN SATURATION: 97 % | RESPIRATION RATE: 16 BRPM | DIASTOLIC BLOOD PRESSURE: 80 MMHG

## 2020-01-06 DIAGNOSIS — B35.4 TINEA CORPORIS: Primary | ICD-10-CM

## 2020-01-06 PROCEDURE — 99213 OFFICE O/P EST LOW 20 MIN: CPT | Performed by: FAMILY MEDICINE

## 2020-01-06 RX ORDER — CEPHALEXIN 500 MG/1
CAPSULE ORAL
COMMUNITY
Start: 2020-01-04 | End: 2020-06-10

## 2020-01-06 RX ORDER — TERBINAFINE HYDROCHLORIDE 250 MG/1
TABLET ORAL
COMMUNITY
Start: 2020-01-04 | End: 2020-06-26

## 2020-01-06 RX ORDER — PRENATAL VIT 91/IRON/FOLIC/DHA 28-975-200
COMBINATION PACKAGE (EA) ORAL 2 TIMES DAILY
Qty: 30 G | Refills: 0 | Status: SHIPPED | OUTPATIENT
Start: 2020-01-06 | End: 2020-06-10

## 2020-01-06 NOTE — PROGRESS NOTES
Problem(s) Oriented visit        SUBJECTIVE:                                                    Radha Bobo is a 59 year old female who presents to clinic today for the following health issues :  Radha presents with an annular rash on her face, neck, hands, arms and chest. She was recently seen at urgent care where she was diagnosed with tinea corpus from a new kitten the family bought. Radha is being treated systemically but would like a cream to help sooth  And treat the rash.       Problem list, Medication list, Allergies, and Medical/Social/Surgical histories reviewed in [facility] and updated as appropriate.   Additional history: as documented    ROS:  Skin: POSITIVE for:, rash see above    Histories:   Patient Active Problem List   Diagnosis     GERD (gastroesophageal reflux disease)     Restless leg syndrome     Colon adenoma     Perioral dermatitis     Anxiety     Major depression in complete remission (H)     Essential hypertension with goal blood pressure less than 130/80     Left foot pain     Chronic vasomotor rhinitis     Intermittent asthma, unspecified asthma severity, unspecified whether complicated     High cholesterol     Hypothyroidism due to acquired atrophy of thyroid     Past Surgical History:   Procedure Laterality Date     HYSTERECTOMY, PAP NO LONGER INDICATED         Social History     Tobacco Use     Smoking status: Never Smoker     Smokeless tobacco: Never Used   Substance Use Topics     Alcohol use: Yes     Alcohol/week: 2.0 standard drinks     Types: 2 Standard drinks or equivalent per week     Family History   Problem Relation Age of Onset     Unknown/Adopted Father      Asthma Mother      Unknown/Adopted Mother            OBJECTIVE:                                                    /80   Pulse 90   Resp 16   SpO2 97%   There is no height or weight on file to calculate BMI.   SKIN: Annular rash with excoriation      ASSESSMENT/PLAN:                                                         Diagnoses and all orders for this visit:    Tinea corporis  -     terbinafine (LAMISIL) 1 % external cream; Apply topically 2 times daily    Please avoid scratching which could lead to a secondary infection. Wash all bedding, clothing and furniture.     ASSESSMENT/PLAN:       The following health maintenance items are reviewed in Epic and correct as of today:  Health Maintenance   Topic Date Due     ADVANCE CARE PLANNING  1960     HIV SCREENING  10/19/1974     HPV  10/19/1980     ASTHMA ACTION PLAN  11/15/2018     ASTHMA CONTROL TEST  09/05/2019     PHQ-9  09/05/2019     PREVENTIVE CARE VISIT  03/05/2020     MAMMO SCREENING  08/23/2020     DTAP/TDAP/TD IMMUNIZATION (2 - Td) 03/11/2021     COLONOSCOPY  12/27/2022     LIPID  03/05/2024     HEPATITIS C SCREENING  Completed     DEPRESSION ACTION PLAN  Completed     INFLUENZA VACCINE  Completed     ZOSTER IMMUNIZATION  Completed     IPV IMMUNIZATION  Aged Out     MENINGITIS IMMUNIZATION  Aged Out     PAP  Discontinued       Kayla Rouse NP  [facility]  Family Practice  [facility]  [phone]    For any issues my office # is [phone]

## 2020-01-24 ENCOUNTER — THERAPY VISIT (OUTPATIENT)
Dept: PHYSICAL THERAPY | Facility: CLINIC | Age: 60
End: 2020-01-24
Payer: COMMERCIAL

## 2020-01-24 DIAGNOSIS — F41.1 ANXIETY IN ACUTE STRESS REACTION: ICD-10-CM

## 2020-01-24 DIAGNOSIS — M79.671 RIGHT FOOT PAIN: ICD-10-CM

## 2020-01-24 DIAGNOSIS — F43.0 ANXIETY IN ACUTE STRESS REACTION: ICD-10-CM

## 2020-01-24 PROCEDURE — 97110 THERAPEUTIC EXERCISES: CPT | Mod: GP | Performed by: PHYSICAL THERAPIST

## 2020-01-24 PROCEDURE — 97161 PT EVAL LOW COMPLEX 20 MIN: CPT | Mod: GP | Performed by: PHYSICAL THERAPIST

## 2020-01-24 RX ORDER — ALPRAZOLAM 1 MG
1 TABLET ORAL
Qty: 30 TABLET | Refills: 0 | Status: SHIPPED | OUTPATIENT
Start: 2020-01-24 | End: 2020-07-15

## 2020-01-24 NOTE — PROGRESS NOTES
[facility] Initial Evaluation    Subjective:  Pt presents to PT with a chief complaint of R foot pain with reported onset on 11/30/19 after a fall on the stairs. Radiographs revealed a fractured R 2-4 Metatarsals, cuboid, navicular, and lateral cuneiform. Pt was placed in a CAM boot and weaned out of the boot on 1/15/20. Pt received an ankle brace after the Boot to use but admits to not using it at this time.       Type of problem:  Right foot   Condition occurred with:  A fall/slip. This is a new condition   Problem details: 11/30/19.   Patient reports pain:  Toes and metatarsal heads. Radiates to:  No radiation. Associated symptoms:  Loss of motion/stiffness, loss of strength and edema. Symptoms are exacerbated by ascending stairs, bending/squatting, descending stairs, standing and walking and relieved by ice.    Symptom: R foot fx.   Date of Onset: 11/20/19. Where condition occurred: at home.HPI: Documentation: fall on stairs.  and reported as 6/10 on pain scale. General health as reported by patient is good. Pertinent medical history includes:  High blood pressure and thyroid problems.    Surgeries: hysterectomy   Current medications:  Anti-depressants, bone density, high blood pressure medication, hormone replacement therapy and thyroid medication.   Primary job tasks include:  Prolonged sitting.  Pain is described as aching and is intermittent. Pain is worse in the P.M.. Since onset symptoms are gradually improving. Special tests:  X-ray and CT scan.     Occupation: .   Barriers include:  None as reported by patient.  Red flags:  None as reported by patient.                      Objective:    Gait:  Good stance time at R LE, mild lateral trunk lean     Assistive Devices:  None            Ankle/Foot Evaluation  ROM:      PROM:    Dorsiflexion:  Left:    10     Right:   5   Plantarflexion:  Left:    60     Right:  60  Inversion:  Left:      65     Right:   60  Eversion: Left:    40     Right:  30          Strength:    Dorsiflexion:  Left: 5/5     Pain:   Right: 5/5   Pain:  Plantarflexion: Left: 5-/5   Pain:   Right: 4/5  Pain:  Inversion:Left: 5/5  Pain:     Right: 4/5   Pain:+  Eversion:Left: 5/5  Pain:  Right: 4/5   Pain:+                                                                              General     ROS    Assessment/Plan:    Patient is a 59 year old female with right side ankle complaints.    Patient has the following significant findings with corresponding treatment plan.                Diagnosis 1:  R foot pain, 2-4 MT fracture  Pain -  manual therapy, splint/taping/bracing/orthotics, self management and education  Decreased ROM/flexibility - manual therapy and therapeutic exercise  Decreased strength - therapeutic exercise and therapeutic activities  Impaired gait - gait training  Impaired muscle performance - neuro re-education    Therapy Evaluation Codes:   Cumulative Therapy Evaluation is: Low complexity.    Previous and current functional limitations:  (See Goal Flow Sheet for this information)    Short term and Long term goals: (See Goal Flow Sheet for this information)     Communication ability:  Patient appears to be able to clearly communicate and understand verbal and written communication and follow directions correctly.  Treatment Explanation - The following has been discussed with the patient:   RX ordered/plan of care  Anticipated outcomes  Possible risks and side effects  This patient would benefit from PT intervention to resume normal activities.   Rehab potential is good.    Frequency:  1 X week, once daily  Duration:  for 6 weeks  Discharge Plan:  Achieve all LTG.  Independent in home treatment program.  Reach maximal therapeutic benefit.    Please refer to the daily flowsheet for treatment today, total treatment time and time spent performing 1:1 timed codes.

## 2020-01-24 NOTE — LETTER
[facility] PHYSICAL THERAPY  [address] [place] [address]  [phone]    2020    Re: Radha Bobo   :   1960  MRN:  [id number]   REFERRING PHYSICIAN:   Michael Foster MD    [facility] PHYSICAL [facility]    Date of Initial Evaluation:  20  Visits:  Rxs Used: 1  Reason for Referral:  Right foot pain    [facility] Initial Evaluation    Subjective:  Pt presents to PT with a chief complaint of R foot pain with reported onset on 19 after a fall on the stairs. Radiographs revealed a fractured R 2-4 Metatarsals, cuboid, navicular, and lateral cuneiform. Pt was placed in a CAM boot and weaned out of the boot on 1/15/20. Pt received an ankle brace after the Boot to use but admits to not using it at this time.   Type of problem:  Right foot   Condition occurred with:  A fall/slip. This is a new condition   Problem details: 19.   Patient reports pain:  Toes and metatarsal heads. Radiates to:  No radiation. Associated symptoms:  Loss of motion/stiffness, loss of strength and edema. Symptoms are exacerbated by ascending stairs, bending/squatting, descending stairs, standing and walking and relieved by ice.  Symptom: R foot fx.   Date of Onset: 19. Where condition occurred: at home.HPI: Documentation: fall on stairs.  and reported as 6/10 on pain scale. General health as reported by patient is good. Pertinent medical history includes:  High blood pressure and thyroid problems.    Surgeries: hysterectomy   Current medications:  Anti-depressants, bone density, high blood pressure medication, hormone replacement therapy and thyroid medication.   Primary job tasks include:  Prolonged sitting.  Pain is described as aching and is intermittent. Pain is worse in the P.M.. Since onset symptoms are gradually improving. Special tests:  X-ray and CT scan.     Occupation: .    Barriers include:  None as reported by patient.  Red flags:  None as reported by patient.                 Objective:    Gait:  Good stance time at R LE, mild lateral trunk lean   Assistive Devices:  None              Ankle/Foot Evaluation  ROM:      PROM:    Dorsiflexion:  Left:    10     Right:   5   Plantarflexion:  Left:    60     Right:  60  Inversion:  Left:      65     Right:   60  Eversion: Left:   40     Right:  30    Strength:    Dorsiflexion:  Left: 5/5     Pain:   Right: 5/5     Plantarflexion: Left: 5-/5   Pain:   Right: 4/5    Inversion:Left: 5/5  Pain:     Right: 4/5   Pain:+  Eversion:Left: 5/5  Pain:  Right: 4/5   Pain:+    Assessment/Plan:    Patient is a 59 year old female with right side ankle complaints.    Patient has the following significant findings with corresponding treatment plan.                Diagnosis 1:  R foot pain, 2-4 MT fracture  Pain -  manual therapy, splint/taping/bracing/orthotics, self management and education  Decreased ROM/flexibility - manual therapy and therapeutic exercise  Decreased strength - therapeutic exercise and therapeutic activities  Impaired gait - gait training  Impaired muscle performance - neuro re-education    Therapy Evaluation Codes:   Cumulative Therapy Evaluation is: Low complexity.  Previous and current functional limitations:  (See Goal Flow Sheet for this information)    Short term and Long term goals: (See Goal Flow Sheet for this information)   Communication ability:  Patient appears to be able to clearly communicate and understand verbal and written communication and follow directions correctly.  Treatment Explanation - The following has been discussed with the patient:   RX ordered/plan of care  Anticipated outcomes  Possible risks and side effects  This patient would benefit from PT intervention to resume normal activities.   Rehab potential is good.  Frequency:  1 X week, once daily  Duration:  for 6 weeks  Discharge Plan:  Achieve all  LTG.  Independent in home treatment program.  Reach maximal therapeutic benefit.  Please refer to the daily flowsheet for treatment today, total treatment time and time spent performing 1:1 timed codes.     Thank you for your referral.    INQUIRIES  Therapist: Elliott Block DPT  [facility] PHYSICAL THERAPY  [address] [place] [address]  Phone: [phone]  Fax: [phone]

## 2020-01-28 PROBLEM — M79.671 RIGHT FOOT PAIN: Status: ACTIVE | Noted: 2020-01-28

## 2020-02-07 ENCOUNTER — THERAPY VISIT (OUTPATIENT)
Dept: PHYSICAL THERAPY | Facility: CLINIC | Age: 60
End: 2020-02-07
Payer: COMMERCIAL

## 2020-02-07 DIAGNOSIS — M79.671 RIGHT FOOT PAIN: ICD-10-CM

## 2020-02-07 PROCEDURE — 97140 MANUAL THERAPY 1/> REGIONS: CPT | Mod: GP | Performed by: PHYSICAL THERAPIST

## 2020-02-07 PROCEDURE — 97110 THERAPEUTIC EXERCISES: CPT | Mod: GP | Performed by: PHYSICAL THERAPIST

## 2020-04-07 PROBLEM — M79.671 RIGHT FOOT PAIN: Status: RESOLVED | Noted: 2020-01-28 | Resolved: 2020-04-07

## 2020-04-07 NOTE — PROGRESS NOTES
Discharge Note    Progress reporting period is from initial evaluation date (please see noted date below) to Feb 7, 2020.  No linked episodes      Radha failed to follow up and current status is unknown.  Please see information below for last relevant information on current status.  Patient seen for   visits.    SUBJECTIVE  Subjective changes noted by patient:  Pt reports feeling about the same overall. Continues to have pain with walking and at the end of the day. Reports using the ankle brace which seems to help but also walking w/o shoes in the house  .  Current pain level is 5/10.     Previous pain level was  6/10.   Changes in function:  Yes (See Goal flowsheet attached for changes in current functional level)  Adverse reaction to treatment or activity: None    OBJECTIVE  Changes noted in objective findings: Discussed need for supportive shoes at all times due to recent transition from a CAM boot. lateral foot/ankle pain with ankle inversion and resisted eversion. Discussed plans for f/u with ortho if sxs worsen/persist over the next 2 weeks     ASSESSMENT/PLAN  Diagnosis: R foot pain; fracture of R 2-4 MT, cuboid, navicular, lateral cuneiform    Updated problem list and treatment plan:   Pain - HEP  Decreased ROM/flexibility - HEP  STG/LTGs have been met or progress has been made towards goals:  Yes, please see goal flowsheet for most current information  Assessment of Progress: current status is unknown.    Last current status: Pt is progressing as expected   Self Management Plans:  HEP  I have re-evaluated this patient and find that the nature, scope, duration and intensity of the therapy is appropriate for the medical condition of the patient.  Radha continues to require the following intervention to meet STG and LTG's:  HEP.    Recommendations:  Discharge with current home program.  Patient to follow up with MD as needed.    Please refer to the daily flowsheet for treatment today, total treatment  time and time spent performing 1:1 timed codes.

## 2020-05-08 ENCOUNTER — MYC REFILL (OUTPATIENT)
Dept: FAMILY MEDICINE | Facility: CLINIC | Age: 60
End: 2020-05-08

## 2020-05-08 DIAGNOSIS — F32.5 MAJOR DEPRESSION IN COMPLETE REMISSION (H): ICD-10-CM

## 2020-05-08 DIAGNOSIS — D12.6 COLON ADENOMA: ICD-10-CM

## 2020-05-08 DIAGNOSIS — G25.81 RESTLESS LEG SYNDROME: ICD-10-CM

## 2020-05-08 DIAGNOSIS — F41.9 ANXIETY: Primary | ICD-10-CM

## 2020-05-08 RX ORDER — BUPROPION HYDROCHLORIDE 300 MG/1
TABLET ORAL
Qty: 90 TABLET | Refills: 1 | Status: SHIPPED | OUTPATIENT
Start: 2020-05-08 | End: 2020-08-24

## 2020-05-08 RX ORDER — SERTRALINE HYDROCHLORIDE 100 MG/1
TABLET, FILM COATED ORAL
Qty: 90 TABLET | Refills: 1 | Status: SHIPPED | OUTPATIENT
Start: 2020-05-08 | End: 2020-08-24

## 2020-05-08 NOTE — TELEPHONE ENCOUNTER
Patient requesting refill on buproprion 300mg QD and sertraline 100mg QD.   Last seen regarding depression/anxiety: 10/2019 with Kayla Rouse CNP. Seen 3 times since then by Kayla Rouse CNP for acute issues.   Future visit: none scheduled.    PHQ 11/15/2017 8/15/2018 3/5/2019   PHQ-9 Total Score 1 2 2   Q9: Thoughts of better off dead/self-harm past 2 weeks Not at all Not at all Not at all       Plan: routed to Kayla Rouse CNP for review.  Davida Jaramillo RN

## 2020-05-10 ENCOUNTER — MYC REFILL (OUTPATIENT)
Dept: FAMILY MEDICINE | Facility: CLINIC | Age: 60
End: 2020-05-10

## 2020-05-10 DIAGNOSIS — N95.1 MENOPAUSAL SYNDROME (HOT FLASHES): ICD-10-CM

## 2020-05-11 RX ORDER — ESTRADIOL 0.5 MG/1
0.5 TABLET ORAL DAILY
Qty: 90 TABLET | Refills: 3 | Status: SHIPPED | OUTPATIENT
Start: 2020-05-11 | End: 2021-05-03

## 2020-06-08 DIAGNOSIS — J30.2 SEASONAL ALLERGIC RHINITIS, UNSPECIFIED TRIGGER: ICD-10-CM

## 2020-06-08 RX ORDER — CETIRIZINE HYDROCHLORIDE, PSEUDOEPHEDRINE HYDROCHLORIDE 5; 120 MG/1; MG/1
TABLET, FILM COATED, EXTENDED RELEASE ORAL
Qty: 60 TABLET | Refills: 1 | OUTPATIENT
Start: 2020-06-08

## 2020-06-10 ENCOUNTER — VIRTUAL VISIT (OUTPATIENT)
Dept: FAMILY MEDICINE | Facility: CLINIC | Age: 60
End: 2020-06-10

## 2020-06-10 DIAGNOSIS — J30.2 SEASONAL ALLERGIC RHINITIS, UNSPECIFIED TRIGGER: ICD-10-CM

## 2020-06-10 PROCEDURE — 99213 OFFICE O/P EST LOW 20 MIN: CPT | Mod: 95 | Performed by: NURSE PRACTITIONER

## 2020-06-10 RX ORDER — CETIRIZINE HYDROCHLORIDE, PSEUDOEPHEDRINE HYDROCHLORIDE 5; 120 MG/1; MG/1
TABLET, FILM COATED, EXTENDED RELEASE ORAL
Qty: 60 TABLET | Refills: 1 | Status: SHIPPED | OUTPATIENT
Start: 2020-06-10 | End: 2022-02-25

## 2020-06-10 ASSESSMENT — ASTHMA QUESTIONNAIRES
QUESTION_2 LAST FOUR WEEKS HOW OFTEN HAVE YOU HAD SHORTNESS OF BREATH: NOT AT ALL
ACT_TOTALSCORE: 24
QUESTION_5 LAST FOUR WEEKS HOW WOULD YOU RATE YOUR ASTHMA CONTROL: WELL CONTROLLED
QUESTION_3 LAST FOUR WEEKS HOW OFTEN DID YOUR ASTHMA SYMPTOMS (WHEEZING, COUGHING, SHORTNESS OF BREATH, CHEST TIGHTNESS OR PAIN) WAKE YOU UP AT NIGHT OR EARLIER THAN USUAL IN THE MORNING: NOT AT ALL
QUESTION_1 LAST FOUR WEEKS HOW MUCH OF THE TIME DID YOUR ASTHMA KEEP YOU FROM GETTING AS MUCH DONE AT WORK, SCHOOL OR AT HOME: NONE OF THE TIME
QUESTION_4 LAST FOUR WEEKS HOW OFTEN HAVE YOU USED YOUR RESCUE INHALER OR NEBULIZER MEDICATION (SUCH AS ALBUTEROL): NOT AT ALL

## 2020-06-10 ASSESSMENT — PATIENT HEALTH QUESTIONNAIRE - PHQ9: SUM OF ALL RESPONSES TO PHQ QUESTIONS 1-9: 2

## 2020-06-10 NOTE — PROGRESS NOTES
Problem(s) Oriented visit        SUBJECTIVE:                                                    Radha Bobo is a 59 year old female who presents to clinic today for the following health issues :  Reports an increase in allergie symptoms including sinus congestion, watering eyes, post nasal drip and rhinorrhea. No cough, fever, chills or change in sleep, appetite or weight.       Problem list, Medication list, Allergies, and Medical/Social/Surgical histories revewed in EPIC and updated as appropriate.   Additional history: as documented    ROS:  5 point ROS completed and negative except noted above, including Gen, CV, Resp, GI, MS    Histories:   Patient Active Problem List   Diagnosis     GERD (gastroesophageal reflux disease)     Restless leg syndrome     Colon adenoma     Perioral dermatitis     Anxiety     Major depression in complete remission (H)     Essential hypertension with goal blood pressure less than 130/80     Left foot pain     Chronic vasomotor rhinitis     Intermittent asthma, unspecified asthma severity, unspecified whether complicated     High cholesterol     Hypothyroidism due to acquired atrophy of thyroid     Past Surgical History:   Procedure Laterality Date     HYSTERECTOMY, PAP NO LONGER INDICATED         Social History     Tobacco Use     Smoking status: Never Smoker     Smokeless tobacco: Never Used   Substance Use Topics     Alcohol use: Yes     Alcohol/week: 2.0 standard drinks     Types: 2 Standard drinks or equivalent per week     Family History   Problem Relation Age of Onset     Unknown/Adopted Father      Asthma Mother      Unknown/Adopted Mother          Current Outpatient Medications   Medication Sig Dispense Refill     albuterol (PROAIR HFA/PROVENTIL HFA/VENTOLIN HFA) 108 (90 Base) MCG/ACT inhaler Inhale 2 puffs into the lungs 4 times daily 1 Inhaler 3     ALPRAZolam (XANAX) 1 MG tablet Take 1 tablet (1 mg) by mouth nightly as needed for anxiety or sleep 30 tablet 0      buPROPion (WELLBUTRIN XL) 300 MG 24 hr tablet TAKE 1 TABLET (300 MG) BY MOUTH EVERY MORNING 90 tablet 1     cetirizine-pseudoePHEDrine ER (ZYRTEC-D) 5-120 MG 12 hr tablet TAKE 1 TABLET TWICE DAILY 60 tablet 1     conjugated estrogens (PREMARIN) 0.625 MG/GM vaginal cream Place 0.5 g vaginally twice a week 42.5 g 12     estradiol (ESTRACE) 0.5 MG tablet Take 1 tablet (0.5 mg) by mouth daily 90 tablet 3     levothyroxine (SYNTHROID/LEVOTHROID) 125 MCG tablet Take 1 tablet (125 mcg) by mouth daily 90 tablet 3     lisinopril-hydrochlorothiazide (PRINZIDE/ZESTORETIC) 20-12.5 MG tablet Take 1 tablet by mouth daily 90 tablet 3     montelukast (SINGULAIR) 10 MG tablet Take 1 tablet (10 mg) by mouth At Bedtime 90 tablet 3     sertraline (ZOLOFT) 100 MG tablet Take 1 tablet daily 90 tablet 1     simvastatin (ZOCOR) 10 MG tablet Take 1 tablet (10 mg) by mouth At Bedtime 90 tablet 3     triamcinolone (NASACORT ALLERGY 24HR) 55 MCG/ACT nasal aerosol Spray 2 sprays into both nostrils daily 3 Bottle 3     VITAMIN D3 1000 UNITS tablet TAKE 1 TABLET (1,000 UNITS) BY MOUTH DAILY 90 tablet 3     potassium chloride ER (KLOR-CON SPRINKLE) 10 MEQ CR capsule TAKE 2 CAPSULES BY MOUTH EVERY DAY 60 capsule 0       OBJECTIVE:                                                    There were no vitals taken for this visit.  There is no height or weight on file to calculate BMI.   APPEARANCE: = Relaxed and in no distress  Conj/Eyelids = conjunctiva are clear, eyes are watery, eyelids somewhat puffy  Ears/Nose = External structures and Nares have usual shape and form  Lips/Teeth/Gums = No lesions seen, good dentition, and gums seem healthy  Oropharynx = No leukoplakia, No injection to the tissues, Normal Uvula  Resp effort = Calm regular breathing  SKIN = absent significant rashes or lesions   Recent/Remote Memory = Alert and Oriented x 3  Mood/Affect = Cooperative and interested     ASSESSMENT/PLAN:                                                         Diagnoses and all orders for this visit:    Seasonal allergic rhinitis, unspecified trigger  -     cetirizine-pseudoePHEDrine ER (ZYRTEC-D) 5-120 MG 12 hr tablet; TAKE 1 TABLET TWICE DAILY    Please call with any changes to your symptoms, call or return to clinic if your symptoms do not improve or become worse.     Patient needs assistance with ADLs: none identified today  Patient needs assistance with iADLs: none identified today    The following health maintenance items are reviewed in Epic and correct as of today:  Health Maintenance   Topic Date Due     ADVANCE CARE PLANNING  1960     HIV SCREENING  10/19/1974     ASTHMA ACTION PLAN  11/15/2018     PREVENTIVE CARE VISIT  03/05/2020     MAMMO SCREENING  08/23/2020     INFLUENZA VACCINE (1) 09/01/2020     ASTHMA CONTROL TEST  12/10/2020     PHQ-9  12/10/2020     DTAP/TDAP/TD IMMUNIZATION (2 - Td) 03/11/2021     COLORECTAL CANCER SCREENING  12/27/2022     LIPID  03/05/2024     HEPATITIS C SCREENING  Completed     DEPRESSION ACTION PLAN  Completed     ZOSTER IMMUNIZATION  Completed     IPV IMMUNIZATION  Aged Out     MENINGITIS IMMUNIZATION  Aged Out     PAP  Discontinued       See Patient Instructions    Kayla Rouse NP  [facility]  Family Practice  [facility]  [phone]    For any issues my office # is [phone]

## 2020-06-11 ASSESSMENT — ASTHMA QUESTIONNAIRES: ACT_TOTALSCORE: 24

## 2020-06-26 ENCOUNTER — VIRTUAL VISIT (OUTPATIENT)
Dept: FAMILY MEDICINE | Facility: CLINIC | Age: 60
End: 2020-06-26

## 2020-06-26 DIAGNOSIS — R10.12 ABDOMINAL PAIN, LEFT UPPER QUADRANT: Primary | ICD-10-CM

## 2020-06-26 PROCEDURE — 99214 OFFICE O/P EST MOD 30 MIN: CPT | Mod: GT | Performed by: NURSE PRACTITIONER

## 2020-06-26 NOTE — PROGRESS NOTES
"Problem(s) Oriented visit      The patient has been notified of following:     \"This video visit will be conducted via a call between you and your physician/provider. We have found that certain health care needs can be provided without the need for an in-person physical exam.  This service lets us provide the care you need with a video conversation.  If a prescription is necessary we can send it directly to your pharmacy.  If lab work is needed we can place an order for that and you can then stop by our lab to have the test done at a later time.    If during the course of the call the physician/provider feels a video visit is not appropriate, you will not be charged for this service.\"     Physician has received verbal consent for a Video Visit from the patient? Yes    SUBJECTIVE:                                                    Radha Bobo is a 59 year old female who presents to clinic today for the following health issues :    Has been feeling sleepy lately, low energy   Has had pain under her left ribs for a few weeks, no changes in bowel habits, color, consistency or frequency. Had been taking the terbinafine for about a week and a half starting on 4/19/2020. Stopped the medication after the rash cleared up.    No changes in diet, no recent exercise or muscle strain, no illness.       Pain can get worse sometimes after eating. Otherwise no pattern.     Problem list, Medication list, Allergies, and Medical/Social/Surgical histories reviewed in [facility] and updated as appropriate.   Additional history: as documented    ROS:  10 point ROS completed and negative except noted above, including Gen, HEENT, CV, Resp, GI, , MS, Neurologic, Psych    Histories:   Patient Active Problem List   Diagnosis     GERD (gastroesophageal reflux disease)     Restless leg syndrome     Colon adenoma     Perioral dermatitis     Anxiety     Major depression in complete remission (H)     Essential hypertension with goal blood pressure " less than 130/80     Left foot pain     Chronic vasomotor rhinitis     Intermittent asthma, unspecified asthma severity, unspecified whether complicated     High cholesterol     Hypothyroidism due to acquired atrophy of thyroid     Past Surgical History:   Procedure Laterality Date     HYSTERECTOMY, PAP NO LONGER INDICATED         Social History     Tobacco Use     Smoking status: Never Smoker     Smokeless tobacco: Never Used   Substance Use Topics     Alcohol use: Yes     Alcohol/week: 2.0 standard drinks     Types: 2 Standard drinks or equivalent per week     Family History   Problem Relation Age of Onset     Unknown/Adopted Father      Asthma Mother      Unknown/Adopted Mother          Current Outpatient Medications   Medication Sig Dispense Refill     albuterol (PROAIR HFA/PROVENTIL HFA/VENTOLIN HFA) 108 (90 Base) MCG/ACT inhaler Inhale 2 puffs into the lungs 4 times daily 1 Inhaler 3     ALPRAZolam (XANAX) 1 MG tablet Take 1 tablet (1 mg) by mouth nightly as needed for anxiety or sleep 30 tablet 0     buPROPion (WELLBUTRIN XL) 300 MG 24 hr tablet TAKE 1 TABLET (300 MG) BY MOUTH EVERY MORNING 90 tablet 1     cetirizine-pseudoePHEDrine ER (ZYRTEC-D) 5-120 MG 12 hr tablet TAKE 1 TABLET TWICE DAILY 60 tablet 1     conjugated estrogens (PREMARIN) 0.625 MG/GM vaginal cream Place 0.5 g vaginally twice a week 42.5 g 12     estradiol (ESTRACE) 0.5 MG tablet Take 1 tablet (0.5 mg) by mouth daily 90 tablet 3     levothyroxine (SYNTHROID/LEVOTHROID) 125 MCG tablet Take 1 tablet (125 mcg) by mouth daily 90 tablet 3     lisinopril-hydrochlorothiazide (PRINZIDE/ZESTORETIC) 20-12.5 MG tablet Take 1 tablet by mouth daily 90 tablet 3     montelukast (SINGULAIR) 10 MG tablet Take 1 tablet (10 mg) by mouth At Bedtime 90 tablet 3     potassium chloride ER (KLOR-CON SPRINKLE) 10 MEQ CR capsule TAKE 2 CAPSULES BY MOUTH EVERY  capsule 3     sertraline (ZOLOFT) 100 MG tablet Take 1 tablet daily 90 tablet 1     simvastatin  (ZOCOR) 10 MG tablet Take 1 tablet (10 mg) by mouth At Bedtime 90 tablet 3     triamcinolone (NASACORT ALLERGY 24HR) 55 MCG/ACT nasal aerosol Spray 2 sprays into both nostrils daily 3 Bottle 3     VITAMIN D3 1000 UNITS tablet TAKE 1 TABLET (1,000 UNITS) BY MOUTH DAILY 90 tablet 3       OBJECTIVE:                                                    There were no vitals taken for this visit.  There is no height or weight on file to calculate BMI.   APPEARANCE: = Relaxed and in no distress  Conj/Eyelids = noninjected and lids and lashes are without inflammation  Ears/Nose = External structures and Nares have usual shape and form  Lips/Teeth/Gums = No lesions seen, good dentition, and gums seem healthy  Resp effort = Calm regular breathing  SKIN = absent significant rashes or lesions on visible skin, color normal for race  Recent/Remote Memory = Alert and Oriented x 3  Mood/Affect = Cooperative and interested     ASSESSMENT/PLAN:                                                        Radha was seen today for chest pain.    Diagnoses and all orders for this visit:    Abdominal pain, left upper quadrant  -     Hepatic Function Panel (7) ([facility]); Future  -     Lipase  Serum ([facility]); Future  -     EKG 12-lead complete w/read - Clinics; Future  Please follow up for labs for your upper left quadrant pain. I will contact you with your results, please return to clinic if you develop a fever of 100.4 or greater, if you experience an change in bowle habits or nausea / vomiting. Please eat bland foods, many small meals per day rather than two to three lager meals. Please reduce or eliminate alcohol use.       Patient needs assistance with ADLs: none identified today  Patient needs assistance with iADLs: none identified today    The following health maintenance items are reviewed in Epic and correct as of today:  Health Maintenance   Topic Date Due     ADVANCE CARE PLANNING  1960     HIV SCREENING  10/19/1974     ASTHMA  ACTION PLAN  11/15/2018     PREVENTIVE CARE VISIT  03/05/2020     MAMMO SCREENING  08/23/2020     ASTHMA CONTROL TEST  12/10/2020     PHQ-9  12/10/2020     DTAP/TDAP/TD IMMUNIZATION (2 - Td) 03/11/2021     COLORECTAL CANCER SCREENING  12/27/2022     LIPID  03/05/2024     HEPATITIS C SCREENING  Completed     DEPRESSION ACTION PLAN  Completed     INFLUENZA VACCINE  Completed     ZOSTER IMMUNIZATION  Completed     IPV IMMUNIZATION  Aged Out     MENINGITIS IMMUNIZATION  Aged Out     PAP  Discontinued     This was a virtual video-visit conducted during the COVID-19 pandemic in regulation with social distancing and quarantine recommendations of the CDC and [place] department of health and human services.  >25 min spent with patient, greater than 50% spent on discussion/education/planning, etc. About The encounter diagnosis was Abdominal pain, left upper quadrant.      See Patient Instructions    Kayla Rouse NP  [facility]  Family Practice  [facility]  [phone]    For any issues my office # is [phone]

## 2020-06-29 DIAGNOSIS — R10.12 ABDOMINAL PAIN, LEFT UPPER QUADRANT: ICD-10-CM

## 2020-06-29 PROCEDURE — 36415 COLL VENOUS BLD VENIPUNCTURE: CPT | Performed by: NURSE PRACTITIONER

## 2020-06-29 PROCEDURE — 93000 ELECTROCARDIOGRAM COMPLETE: CPT | Performed by: NURSE PRACTITIONER

## 2020-06-29 PROCEDURE — 80076 HEPATIC FUNCTION PANEL: CPT | Mod: 90 | Performed by: NURSE PRACTITIONER

## 2020-06-29 PROCEDURE — 83690 ASSAY OF LIPASE: CPT | Mod: 90 | Performed by: NURSE PRACTITIONER

## 2020-06-30 LAB
ALBUMIN SERPL-MCNC: 4.4 G/DL (ref 3.8–4.9)
ALP SERPL-CCNC: 73 IU/L (ref 39–117)
ALT SERPL-CCNC: 23 IU/L (ref 0–32)
AST SERPL-CCNC: 22 IU/L (ref 0–40)
BILIRUB SERPL-MCNC: 0.3 MG/DL (ref 0–1.2)
BILIRUBIN, DIRECT: 0.08 MG/DL (ref 0–0.4)
LIPASE SERPL-CCNC: 26 U/L (ref 14–72)
PROT SERPL-MCNC: 6.3 G/DL (ref 6–8.5)

## 2020-07-01 ENCOUNTER — MYC REFILL (OUTPATIENT)
Dept: FAMILY MEDICINE | Facility: CLINIC | Age: 60
End: 2020-07-01

## 2020-07-01 DIAGNOSIS — I10 ESSENTIAL HYPERTENSION WITH GOAL BLOOD PRESSURE LESS THAN 130/80: ICD-10-CM

## 2020-07-01 RX ORDER — POTASSIUM CHLORIDE 750 MG/1
CAPSULE, EXTENDED RELEASE ORAL
Qty: 60 CAPSULE | Refills: 0 | Status: SHIPPED | OUTPATIENT
Start: 2020-07-01 | End: 2020-07-28

## 2020-07-02 NOTE — RESULT ENCOUNTER NOTE
Dear Radha,     Kayla Rouse and I are working together. Your included test results from your recent visit are within normal ranges. I do not suggest that we make any changes at this time.    Lenny Carrillo M.D.

## 2020-07-15 ENCOUNTER — MYC REFILL (OUTPATIENT)
Dept: FAMILY MEDICINE | Facility: CLINIC | Age: 60
End: 2020-07-15

## 2020-07-15 DIAGNOSIS — F43.0 ANXIETY IN ACUTE STRESS REACTION: ICD-10-CM

## 2020-07-15 DIAGNOSIS — F41.1 ANXIETY IN ACUTE STRESS REACTION: ICD-10-CM

## 2020-07-15 NOTE — TELEPHONE ENCOUNTER
Patient requesting refill on alprazolam 1mg #30 tabs.     Last ordered 20 by Kayla Rouse CNP- patient filled 20.   Also takes sertraline and bupropion.   Last visit: 20 with Kayla Rouse CNP for abd pain.   No future visits.    Med initially rx'd at 10/23/19 visit - son had  few days prior.    ALPRAZolam      Dispensed  Days Supply  Quantity  Provider  Pharmacy    ALPRAZOLAM   TAB 1MG  2020  30  30 each   [id number] ...    ALPRAZOLAM   TAB 1MG  10/23/2019  30  30 each   [id number] ...         Routed to Kayla Rouse CNP for review.  Davida Jaramillo RN

## 2020-07-16 RX ORDER — ALPRAZOLAM 1 MG
1 TABLET ORAL
Qty: 30 TABLET | Refills: 0 | Status: SHIPPED | OUTPATIENT
Start: 2020-07-16 | End: 2020-11-12

## 2020-07-28 ENCOUNTER — TELEPHONE (OUTPATIENT)
Dept: FAMILY MEDICINE | Facility: CLINIC | Age: 60
End: 2020-07-28

## 2020-07-28 DIAGNOSIS — I10 ESSENTIAL HYPERTENSION WITH GOAL BLOOD PRESSURE LESS THAN 130/80: ICD-10-CM

## 2020-07-28 RX ORDER — POTASSIUM CHLORIDE 750 MG/1
CAPSULE, EXTENDED RELEASE ORAL
Qty: 60 CAPSULE | Refills: 0 | Status: SHIPPED | OUTPATIENT
Start: 2020-07-28 | End: 2020-08-20

## 2020-07-28 NOTE — TELEPHONE ENCOUNTER
POTASSIUM  LOV (VV) 6/26/2020  LOV (RELATED, CPX) 3/5/19  LAST LABS 3/5/19    DUE FOR MED CHECK & FASTING LABS    BP Readings from Last 3 Encounters:   01/06/20 122/80   12/02/19 118/78   11/15/19 116/80       Last Comprehensive Metabolic Panel:  Sodium   Date Value Ref Range Status   03/05/2019 139 134 - 144 mmol/L Final     Potassium   Date Value Ref Range Status   03/05/2019 4.4 3.5 - 5.2 mmol/L Final     Chloride   Date Value Ref Range Status   03/05/2019 99 96 - 106 mmol/L Final     Glucose   Date Value Ref Range Status   03/05/2019 93 65 - 99 mg/dL Final     Urea Nitrogen   Date Value Ref Range Status   03/05/2019 16 6 - 24 mg/dL Final     BUN/Creatinine Ratio   Date Value Ref Range Status   03/05/2019 22 9 - 23 Final     Creatinine   Date Value Ref Range Status   03/05/2019 0.74 0.57 - 1.00 mg/dL Final     Calcium   Date Value Ref Range Status   03/05/2019 9.7 8.7 - 10.2 mg/dL Final

## 2020-07-28 NOTE — TELEPHONE ENCOUNTER
7/28/20 left message on voice mail for the patient to call the clinic.  Due for fasting labs and a medication review.    Suresh

## 2020-08-20 DIAGNOSIS — I10 ESSENTIAL HYPERTENSION WITH GOAL BLOOD PRESSURE LESS THAN 130/80: ICD-10-CM

## 2020-08-20 RX ORDER — POTASSIUM CHLORIDE 750 MG/1
CAPSULE, EXTENDED RELEASE ORAL
Qty: 60 CAPSULE | Refills: 0 | Status: SHIPPED | OUTPATIENT
Start: 2020-08-20 | End: 2020-08-24

## 2020-08-24 ENCOUNTER — OFFICE VISIT (OUTPATIENT)
Dept: FAMILY MEDICINE | Facility: CLINIC | Age: 60
End: 2020-08-24

## 2020-08-24 VITALS
OXYGEN SATURATION: 95 % | BODY MASS INDEX: 25.56 KG/M2 | HEART RATE: 87 BPM | DIASTOLIC BLOOD PRESSURE: 88 MMHG | WEIGHT: 160.8 LBS | SYSTOLIC BLOOD PRESSURE: 136 MMHG | TEMPERATURE: 97.9 F

## 2020-08-24 DIAGNOSIS — E78.5 HYPERLIPIDEMIA LDL GOAL <100: ICD-10-CM

## 2020-08-24 DIAGNOSIS — F33.1 MODERATE EPISODE OF RECURRENT MAJOR DEPRESSIVE DISORDER (H): ICD-10-CM

## 2020-08-24 DIAGNOSIS — E03.9 ACQUIRED HYPOTHYROIDISM: ICD-10-CM

## 2020-08-24 DIAGNOSIS — J45.20 INTERMITTENT ASTHMA, UNSPECIFIED ASTHMA SEVERITY, UNSPECIFIED WHETHER COMPLICATED: ICD-10-CM

## 2020-08-24 DIAGNOSIS — Z12.31 ENCOUNTER FOR SCREENING MAMMOGRAM FOR BREAST CANCER: ICD-10-CM

## 2020-08-24 DIAGNOSIS — I10 ESSENTIAL HYPERTENSION WITH GOAL BLOOD PRESSURE LESS THAN 130/80: Primary | ICD-10-CM

## 2020-08-24 PROBLEM — M79.672 LEFT FOOT PAIN: Status: RESOLVED | Noted: 2017-08-01 | Resolved: 2020-08-24

## 2020-08-24 PROCEDURE — 99214 OFFICE O/P EST MOD 30 MIN: CPT | Performed by: NURSE PRACTITIONER

## 2020-08-24 PROCEDURE — 93050 ART PRESSURE WAVEFORM ANALYS: CPT | Performed by: NURSE PRACTITIONER

## 2020-08-24 RX ORDER — LISINOPRIL AND HYDROCHLOROTHIAZIDE 12.5; 2 MG/1; MG/1
1 TABLET ORAL DAILY
Qty: 90 TABLET | Refills: 3 | Status: SHIPPED | OUTPATIENT
Start: 2020-08-24 | End: 2022-05-19

## 2020-08-24 RX ORDER — BUPROPION HYDROCHLORIDE 300 MG/1
TABLET ORAL
Qty: 90 TABLET | Refills: 1 | Status: SHIPPED | OUTPATIENT
Start: 2020-08-24 | End: 2021-06-14

## 2020-08-24 RX ORDER — SERTRALINE HYDROCHLORIDE 100 MG/1
150 TABLET, FILM COATED ORAL DAILY
Qty: 135 TABLET | Refills: 1 | Status: SHIPPED | OUTPATIENT
Start: 2020-08-24 | End: 2020-11-05

## 2020-08-24 RX ORDER — POTASSIUM CHLORIDE 750 MG/1
20 CAPSULE, EXTENDED RELEASE ORAL DAILY
Qty: 180 CAPSULE | Refills: 1 | Status: SHIPPED | OUTPATIENT
Start: 2020-08-24 | End: 2020-10-12

## 2020-08-24 RX ORDER — SIMVASTATIN 10 MG
10 TABLET ORAL AT BEDTIME
Qty: 90 TABLET | Refills: 3 | Status: SHIPPED | OUTPATIENT
Start: 2020-08-24 | End: 2021-10-11

## 2020-08-24 RX ORDER — LEVOTHYROXINE SODIUM 125 UG/1
125 TABLET ORAL DAILY
Qty: 90 TABLET | Refills: 3 | Status: SHIPPED | OUTPATIENT
Start: 2020-08-24 | End: 2021-10-04

## 2020-08-24 NOTE — PROGRESS NOTES
Problem(s) Oriented visit        SUBJECTIVE:                                                    Radha Bobo is a 59 year old female who presents to clinic today for the following health issues :    Hypertension - controlled. Taking Lisinopril-hydrochlorothiazide 20-12.5 mg daily without side effects. Thinks blood pressure slightly higher today due to stress. Denies chest pain, pressure, palpitations, shortness of breath, headaches, dizziness, ankle swelling.  BP Readings from Last 3 Encounters:   08/24/20 136/88   01/06/20 122/80   12/02/19 118/78     Asthma - intermittent, well controlled. Has exacerbations with viral illnesses and uncontrolled asthma. Taking Zyrtec-D 1 pill 2 times daily as well as Nasocort spray. Continues to have rhinorrhea & postnasal drainage.    Hypothyroidism - Taking Levothyroxine 125 mcg daily. Denies temperature intolerance, weight changes.    Hyperlipidemia - Taking Simvastatin 10 mg daily without side effects.  The 10-year ASCVD risk score (Rebeka ENGLISH Jr., et al., 2013) is: 3.8%    Values used to calculate the score:      Age: 59 years      Sex: Female      Is Non- : No      Diabetic: No      Tobacco smoker: No      Systolic Blood Pressure: 136 mmHg      Is BP treated: Yes      HDL Cholesterol: 83 mg/dL      Total Cholesterol: 203 mg/dL    Depression - worse lately due to upcoming one year anniversary of losing her 33 year old son unexpectedly. Goes to therapist regularly. Taking Sertraline 100 mg daily & Bupropion 300 mg daily. Feels like she is crying more frequently and feels very depressed    Problem list, Medication list, Allergies, and Medical/Social/Surgical histories reviewed in EPIC and updated as appropriate.   Additional history: as documented    ROS:  10 point ROS completed and negative except noted above, including Gen, HEENT, CV, Resp, GI, , MS, Neurologic, Psych    Histories:   Patient Active Problem List   Diagnosis     GERD (gastroesophageal  "reflux disease)     Restless leg syndrome     Colon adenoma     Anxiety     Moderate episode of recurrent major depressive disorder (H)     Essential hypertension with goal blood pressure less than 130/80     Chronic vasomotor rhinitis     Intermittent asthma, unspecified asthma severity, unspecified whether complicated     Other hyperlipidemia     Hypothyroidism due to acquired atrophy of thyroid     Past Surgical History:   Procedure Laterality Date     HYSTERECTOMY, PAP NO LONGER INDICATED         Social History     Tobacco Use     Smoking status: Never Smoker     Smokeless tobacco: Never Used   Substance Use Topics     Alcohol use: Yes     Alcohol/week: 2.0 standard drinks     Types: 2 Standard drinks or equivalent per week     Family History   Problem Relation Age of Onset     Unknown/Adopted Father      Asthma Mother      Unknown/Adopted Mother            OBJECTIVE:                                                    /88 (BP Location: Left arm, Patient Position: Sitting)   Pulse 87   Temp 97.9  F (36.6  C)   Wt 72.9 kg (160 lb 12.8 oz)   SpO2 95%   BMI 25.56 kg/m    Body mass index is 25.56 kg/m .   GENERAL: healthy, alert and no distress  EYES: Eyes grossly normal to inspection  RESP: lungs clear to auscultation - no rales, rhonchi or wheezes  CV: regular rate and rhythm, normal S1 S2, no S3 or S4, no murmur, click or rub, no peripheral edema and peripheral pulses strong  NEURO: Mentation intact and speech normal  PSYCH: Labile affect, crying frequently, depressed mood     ASSESSMENT/PLAN:                                                        BMI:   Estimated body mass index is 25.56 kg/m  as calculated from the following:    Height as of 12/2/19: 1.689 m (5' 6.5\").    Weight as of this encounter: 72.9 kg (160 lb 12.8 oz).   Weight management plan: Discussed healthy diet and exercise guidelines      Radha was seen today for recheck medication and breast problem.    Diagnoses and all orders for " this visit:    Essential hypertension with goal blood pressure less than 130/80  -     C ART PRESS WAVEFORM ANALYS CENTRAL ART PRESSURE  -     Comp. Metabolic Panel (14) ([facility])  -     lisinopril-hydrochlorothiazide (ZESTORETIC) 20-12.5 MG tablet; Take 1 tablet by mouth daily  -     potassium chloride ER (MICRO-K) 10 MEQ CR capsule; Take 2 capsules (20 mEq) by mouth daily  Borderline elevated today. Discussed lifestyle modifications and checking blood pressure outside of clinic.    Intermittent asthma, unspecified asthma severity, unspecified whether complicated  Discussed correct use of Nasocort and allergy management.    Acquired hypothyroidism  -     TSH ([facility])  -     levothyroxine (SYNTHROID/LEVOTHROID) 125 MCG tablet; Take 1 tablet (125 mcg) by mouth daily    Hyperlipidemia LDL goal <100  -     Lipid Panel ([facility])  -     simvastatin (ZOCOR) 10 MG tablet; Take 1 tablet (10 mg) by mouth At Bedtime    Moderate episode of recurrent major depressive disorder (H)  -     sertraline (ZOLOFT) 100 MG tablet; Take 1.5 tablets (150 mg) by mouth daily  -     buPROPion (WELLBUTRIN XL) 300 MG 24 hr tablet; TAKE 1 TABLET (300 MG) BY MOUTH EVERY MORNING  Increasing Sertraline 150 mg daily. Patient will follow-up in 1 month if symptoms not improving. Continue Bupropion and continue to see therapist    Encounter for screening mammogram for breast cancer  -     MAMMO -  Screening Digital Bilateral (FUTURE/[facility]); Future        FUTURE APPOINTMENTS:       - Follow-up for annual visit or as needed  Patient Instructions   Increase Sertraline to 1.5 tab (150 mg) daily  Continue Bupropion  Alprazolam as needed    Monitor blood pressure.  Goal < 130/80  At least want < 140/90    Thank you for coming in today!     We are working to improve our digital reputation.  Would you please help us by reviewing our clinic on Google and/or Facebook?  These are links for filling out a review for the  clinic:    [email or web address]                 [email or web address]    We truly appreciate you taking the time to do this.     General Information:    Today you had your appointment with Thao Boo CNP  My hours are:    Monday 8 AM - 5 PM  Tuesday: 8 AM - 5 PM  Thursday: 8 AM - 5 PM  Fridays: 8 AM - 5 PM    I am not in the office Wednesdays. Therefore non urgent calls received on Wednesday will be addressed when I am back in the office on Thursday.     If lab work was done today as part of your evaluation you will generally be contacted via My Chart, mail, or phone with the results within 1-5 days. If there is an alarming result we will contact you by phone. Lab results come back at varying times, I generally wait until all labs are resulted before making comments on results. Please note labs are automatically released to My Chart once available.     If you need refills please contact your pharmacy They will send a refill request to me to review. Please allow 3 business days for us to process all refill requests.     Please call or send a medical message with any questions or concerns        The following health maintenance items are reviewed in Epic and correct as of today:  Health Maintenance   Topic Date Due     ADVANCE CARE PLANNING  1960     ASTHMA ACTION PLAN  11/15/2018     PREVENTIVE CARE VISIT  03/05/2020     MAMMO SCREENING  08/23/2020     INFLUENZA VACCINE (1) 09/01/2020     ASTHMA CONTROL TEST  12/10/2020     PHQ-9  12/10/2020     DTAP/TDAP/TD IMMUNIZATION (2 - Td) 03/11/2021     COLORECTAL CANCER SCREENING  12/27/2022     LIPID  03/05/2024     HEPATITIS C SCREENING  Completed     DEPRESSION ACTION PLAN  Completed     ZOSTER IMMUNIZATION  Completed     IPV IMMUNIZATION  Aged Out     MENINGITIS IMMUNIZATION  Aged Out     HEPATITIS B IMMUNIZATION  Aged Out     HIV SCREENING  Discontinued     PAP  Discontinued       LUPE Guajardo  CNP  [facility] [place]  [phone]    For any issues my office # is [phone]

## 2020-08-24 NOTE — PATIENT INSTRUCTIONS
Increase Sertraline to 1.5 tab (150 mg) daily  Continue Bupropion  Alprazolam as needed    Monitor blood pressure.  Goal < 130/80  At least want < 140/90    Thank you for coming in today!     We are working to improve our digital reputation.  Would you please help us by reviewing our clinic on Google and/or Facebook?  These are links for filling out a review for the clinic:    [email or web address]                 [email or web address]    We truly appreciate you taking the time to do this.     General Information:    Today you had your appointment with Thao Boo CNP  My hours are:    Monday 8 AM - 5 PM  Tuesday: 8 AM - 5 PM  Thursday: 8 AM - 5 PM  Fridays: 8 AM - 5 PM    I am not in the office Wednesdays. Therefore non urgent calls received on Wednesday will be addressed when I am back in the office on Thursday.     If lab work was done today as part of your evaluation you will generally be contacted via My Chart, mail, or phone with the results within 1-5 days. If there is an alarming result we will contact you by phone. Lab results come back at varying times, I generally wait until all labs are resulted before making comments on results. Please note labs are automatically released to My Chart once available.     If you need refills please contact your pharmacy They will send a refill request to me to review. Please allow 3 business days for us to process all refill requests.     Please call or send a medical message with any questions or concerns

## 2020-08-25 LAB
ALBUMIN SERPL-MCNC: 4.2 G/DL (ref 3.8–4.9)
ALBUMIN/GLOB SERPL: 1.8 {RATIO} (ref 1.2–2.2)
ALP SERPL-CCNC: 76 IU/L (ref 39–117)
ALT SERPL-CCNC: 26 IU/L (ref 0–32)
AST SERPL-CCNC: 26 IU/L (ref 0–40)
BILIRUB SERPL-MCNC: 0.3 MG/DL (ref 0–1.2)
BUN SERPL-MCNC: 19 MG/DL (ref 8–27)
BUN/CREATININE RATIO: 26 (ref 12–28)
CALCIUM SERPL-MCNC: 9.7 MG/DL (ref 8.7–10.3)
CHLORIDE SERPLBLD-SCNC: 101 MMOL/L (ref 96–106)
CHOLEST SERPL-MCNC: 261 MG/DL (ref 100–199)
CREAT SERPL-MCNC: 0.73 MG/DL (ref 0.57–1)
EGFR IF AFRICN AM: 104 ML/MIN/1.73
EGFR IF NONAFRICN AM: 90 ML/MIN/1.73
GLOBULIN, TOTAL: 2.4 G/DL (ref 1.5–4.5)
GLUCOSE SERPL-MCNC: 98 MG/DL (ref 65–99)
HDLC SERPL-MCNC: 77 MG/DL
LDL/HDL RATIO: 2 RATIO (ref 0–3.2)
LDLC SERPL CALC-MCNC: 156 MG/DL (ref 0–99)
POTASSIUM SERPL-SCNC: 4.3 MMOL/L (ref 3.5–5.2)
PROT SERPL-MCNC: 6.6 G/DL (ref 6–8.5)
SODIUM SERPL-SCNC: 139 MMOL/L (ref 134–144)
TOTAL CO2: 25 MMOL/L (ref 20–29)
TRIGL SERPL-MCNC: 141 MG/DL (ref 0–149)
TSH BLD-ACNC: 2.27 UIU/ML (ref 0.45–4.5)
VLDLC SERPL CALC-MCNC: 28 MG/DL (ref 5–40)

## 2020-09-04 ENCOUNTER — HOSPITAL ENCOUNTER (OUTPATIENT)
Dept: MAMMOGRAPHY | Facility: CLINIC | Age: 60
Discharge: HOME | End: 2020-09-04
Attending: NURSE PRACTITIONER | Admitting: NURSE PRACTITIONER
Payer: COMMERCIAL

## 2020-09-04 DIAGNOSIS — Z12.31 VISIT FOR SCREENING MAMMOGRAM: ICD-10-CM

## 2020-09-04 PROCEDURE — 77063 BREAST TOMOSYNTHESIS BI: CPT

## 2020-10-12 ENCOUNTER — OFFICE VISIT (OUTPATIENT)
Dept: FAMILY MEDICINE | Facility: CLINIC | Age: 60
End: 2020-10-12

## 2020-10-12 VITALS
HEART RATE: 78 BPM | WEIGHT: 161 LBS | DIASTOLIC BLOOD PRESSURE: 76 MMHG | RESPIRATION RATE: 16 BRPM | BODY MASS INDEX: 25.6 KG/M2 | SYSTOLIC BLOOD PRESSURE: 128 MMHG | OXYGEN SATURATION: 98 % | TEMPERATURE: 97.9 F

## 2020-10-12 DIAGNOSIS — R10.12 LUQ ABDOMINAL PAIN: Primary | ICD-10-CM

## 2020-10-12 DIAGNOSIS — I10 ESSENTIAL HYPERTENSION WITH GOAL BLOOD PRESSURE LESS THAN 130/80: ICD-10-CM

## 2020-10-12 DIAGNOSIS — J30.1 SEASONAL ALLERGIC RHINITIS DUE TO POLLEN: ICD-10-CM

## 2020-10-12 LAB
% GRANULOCYTES: 69.8 % (ref 42.2–75.2)
BACTERIA URINE: NORMAL
BILIRUB UR QL STRIP: NORMAL
BLOOD URINE DIP: NORMAL
CASTS/LPF: NORMAL
COLOR UR: YELLOW
CRYSTAL URINE: NORMAL
EPITHELIAL CELLS - QUEST: NORMAL
GLUCOSE UR STRIP-MCNC: NORMAL MG/DL
HCT VFR BLD AUTO: 41.7 % (ref 35–46)
HEMOGLOBIN: 14 G/DL (ref 11.8–15.5)
KETONES UR QL STRIP: NORMAL
LEUKOCYTE ESTERASE URINE DIP: NORMAL
LYMPHOCYTES NFR BLD AUTO: 22.8 % (ref 20.5–51.1)
MCH RBC QN AUTO: 29.2 PG (ref 27–31)
MCHC RBC AUTO-ENTMCNC: 33.6 G/DL (ref 33–37)
MCV RBC AUTO: 86.9 FL (ref 80–100)
MONOCYTES NFR BLD AUTO: 7.4 % (ref 1.7–9.3)
MUCOUS URINE: NORMAL
NITRITE UR QL STRIP: NORMAL
PH UR STRIP: 8.5 PH (ref 5–9)
PLATELET # BLD AUTO: 330 K/UL (ref 140–450)
PROT UR QL: NORMAL MG/DL (ref ?–0.01)
RBC # BLD AUTO: 4.8 X10/CMM (ref 3.7–5.2)
RBC URINE: NORMAL (ref 0–3)
SP GR UR STRIP: 1.02 (ref 1–1.02)
UROBILINOGEN UR QL STRIP: 0.2 EU/DL (ref 0.2–1)
WBC # BLD AUTO: 7 X10/CMM (ref 3.8–11)
WBC URINE: NORMAL (ref 0–3)

## 2020-10-12 PROCEDURE — 99214 OFFICE O/P EST MOD 30 MIN: CPT | Performed by: NURSE PRACTITIONER

## 2020-10-12 PROCEDURE — 85025 COMPLETE CBC W/AUTO DIFF WBC: CPT | Performed by: NURSE PRACTITIONER

## 2020-10-12 PROCEDURE — 81003 URINALYSIS AUTO W/O SCOPE: CPT | Performed by: NURSE PRACTITIONER

## 2020-10-12 RX ORDER — TRIAMCINOLONE ACETONIDE 55 UG/1
2 SPRAY, METERED NASAL DAILY
Qty: 3 BOTTLE | Refills: 3 | Status: SHIPPED | OUTPATIENT
Start: 2020-10-12 | End: 2021-03-11

## 2020-10-12 RX ORDER — POTASSIUM CHLORIDE 750 MG/1
20 CAPSULE, EXTENDED RELEASE ORAL DAILY
Qty: 180 CAPSULE | Refills: 1 | Status: SHIPPED | OUTPATIENT
Start: 2020-10-12 | End: 2021-04-14

## 2020-10-12 NOTE — PATIENT INSTRUCTIONS
CT of the abdomen and pelvis to eval for the source of your localized, constant pain    Checking certain labs and urine today as well    Heat may be soothing    My Asthma Action Plan    Name: Radha Bobo   YOB: 1960  Date: 10/12/2020   My doctor: LUPE Borges CNP   My clinic: [facility]        My Rescue Medicine:   Albuterol inhaler (Proair/Ventolin/Proventil HFA)  2-4 puffs EVERY 4 HOURS as needed. Use a spacer if recommended by your provider.   My Asthma Severity:   Intermittent / Exercise Induced  Know your asthma triggers: smoke and upper respiratory infections             GREEN ZONE   Good Control    I feel good    No cough or wheeze    Can work, sleep and play without asthma symptoms       Take your asthma control medicine every day.     1. If exercise triggers your asthma, take your rescue medication    15 minutes before exercise or sports, and    During exercise if you have asthma symptoms  2. Spacer to use with inhaler: If you have a spacer, make sure to use it with your inhaler             YELLOW ZONE Getting Worse  I have ANY of these:    I do not feel good    Cough or wheeze    Chest feels tight    Wake up at night   1. Keep taking your Green Zone medications  2. Start taking your rescue medicine:    every 20 minutes for up to 1 hour. Then every 4 hours for 24-48 hours.  3. If you stay in the Yellow Zone for more than 12-24 hours, contact your doctor.  4. If you do not return to the Green Zone in 12-24 hours or you get worse, start taking your oral steroid medicine if prescribed by your provider.           RED ZONE Medical Alert - Get Help  I have ANY of these:    I feel awful    Medicine is not helping    Breathing getting harder    Trouble walking or talking    Nose opens wide to breathe       1. Take your rescue medicine NOW  2. If your provider has prescribed an oral steroid medicine, start taking it NOW  3. Call your doctor NOW  4. If you are still in the  Red Zone after 20 minutes and you have not reached your doctor:    Take your rescue medicine again and    Call 911 or go to the emergency room right away    See your regular doctor within 2 weeks of an Emergency Room or Urgent Care visit for follow-up treatment.          Annual Reminders:  Meet with Asthma Educator,  Flu Shot in the Fall, consider Pneumonia Vaccination for patients with asthma (aged 19 and older).    Pharmacy:    [facility] - FAX ONLY  [id number] - [place], [place] - [address] [employer]  [id number] - [place], [place] - [address] AT [facility] [address]    Electronically signed by LUPE Borges CNP   Date: 10/12/20                    Asthma Triggers  How To Control Things That Make Your Asthma Worse    Triggers are things that make your asthma worse.  Look at the list below to help you find your triggers and   what you can do about them. You can help prevent asthma flare-ups by staying away from your triggers.      Trigger                                                          What you can do   Cigarette Smoke  Tobacco smoke can make asthma worse. Do not allow smoking in your home, car or around you.  Be sure no one smokes at a child s day care or school.  If you smoke, ask your health care provider for ways to help you quit.  Ask family members to quit too.  Ask your health care provider for a referral to Quit Plan to help you quit smoking, or call 2-828-994-PLAN.     Colds, Flu, Bronchitis  These are common triggers of asthma. Wash your hands often.  Don t touch your eyes, nose or mouth.  Get a flu shot every year.     Dust Mites  These are tiny bugs that live in cloth or carpet. They are too small to see. Wash sheets and blankets in hot water every week.   Encase pillows and mattress in dust mite proof covers.  Avoid having carpet if you can. If you have carpet, vacuum weekly.   Use a dust mask and HEPA vacuum.   Pollen and Outdoor Mold  Some  people are allergic to trees, grass, or weed pollen, or molds. Try to keep your windows closed.  Limit time out doors when pollen count is high.   Ask you health care provider about taking medicine during allergy season.     Animal Dander  Some people are allergic to skin flakes, urine or saliva from pets with fur or feathers. Keep pets with fur or feathers out of your home.    If you can t keep the pet outdoors, then keep the pet out of your bedroom.  Keep the bedroom door closed.  Keep pets off cloth furniture and away from stuffed toys.     Mice, Rats, and Cockroaches  Some people are allergic to the waste from these pests.   Cover food and garbage.  Clean up spills and food crumbs.  Store grease in the refrigerator.   Keep food out of the bedroom.   Indoor Mold  This can be a trigger if your home has high moisture. Fix leaking faucets, pipes, or other sources of water.   Clean moldy surfaces.  Dehumidify basement if it is damp and smelly.   Smoke, Strong Odors, and Sprays  These can reduce air quality. Stay away from strong odors and sprays, such as perfume, powder, hair spray, paints, smoke incense, paint, cleaning products, candles and new carpet.   Exercise or Sports  Some people with asthma have this trigger. Be active!  Ask your doctor about taking medicine before sports or exercise to prevent symptoms.    Warm up for 5-10 minutes before and after sports or exercise.     Other Triggers of Asthma  Cold air:  Cover your nose and mouth with a scarf.  Sometimes laughing or crying can be a trigger.  Some medicines and food can trigger asthma.       General Information:     Today you had your appointment with Edna Kramer CNP  My hours are:     Monday 8 AM - 5 PM  Tuesday: 8 AM - 5 PM  Wednesday: 8 AM - 5 PM  Thursday: 8 AM - 5 PM     I am not in the office Fridays. Therefore, non urgent calls received on Friday will be addressed when I am back in the office on Monday.      If lab work was done today as part  of your evaluation you will generally be contacted via My Chart, mail, or phone with the results within 1-5 days. If there is an alarming result we will contact you by phone. Lab results come back at varying times- I generally wait until all labs are resulted before making comments on results. Please note labs are automatically released to My Chart once available.      If you need refills please contact your pharmacy. They will send a refill request to me to review. Please allow 3 business days for us to process all refill requests.      Please call or send a medical message with any questions or concerns    We are working to improve our digital reputation.  Would you please help us by reviewing our clinic on Google and/or Facebook?  These are links for filling out a review for the clinic:    [email or web address]                [email or web address]    We truly appreciate you taking the time to do this.

## 2020-10-12 NOTE — PROGRESS NOTES
Problem(s) Oriented visit        SUBJECTIVE:                                                    Radha Bobo is a 59 year old female who presents to clinic today for the following health issues :    First noticed LUQ pain with a gradual onset about six months ago. It is a dull ache that is constantly present, but sometimes it is worse than others. Tender to touch. Feels pretty localized but will sometimes wrap around to her back. Staying the same over time. Does not wake her up at night. No unintended weight changes, fevers, chills, night sweats, chest pain. No nausea or vomiting. Is sleeping more. No urinary problems. Bowel are regular. Feels a little bloated, but denies early satiety. No changes in heartburn. No vaginal bleeding, has had hysterectomy with oophorectomy 30 years ago. Done due to endometriosis of the bowel. Has been on estradiol since then. Does not smoke. No hx of shingles or rashes to the area. Has had lab work up for this problem done in 2020 and had largely normal labs at her physical in August. Cannot identify any aggravating or alleviating factors. No hx of trauma to the back or abdomen. Last colonoscopy . 1-2 drinks about 2-3 times per week. Of note, the one year anniversary of her son's death is next week. He  from complications of diabetes at the age of 33 on her birthday. She is grieving and is wondering if grief is the cause of her abdominal pain.    She needs refills on triamcinolone nasal spray and potassium supplement- addressed at her physical in August. She also needs an asthma action plan printed off- addressed at her physical, states her asthma is well controlled and intermittent.    Problem list, Medication list, Allergies, and Medical/Social/Surgical histories reviewed in [facility] and updated as appropriate.   Additional history: as documented    ROS:  Gen, CV, resp, GI, , MSK, skin negative except as listed per HPI    Histories:   Patient Active Problem List    Diagnosis     GERD (gastroesophageal reflux disease)     Restless leg syndrome     Colon adenoma     Anxiety     Moderate episode of recurrent major depressive disorder (H)     Essential hypertension with goal blood pressure less than 130/80     Chronic vasomotor rhinitis     Intermittent asthma, unspecified asthma severity, unspecified whether complicated     Other hyperlipidemia     Hypothyroidism due to acquired atrophy of thyroid     Past Surgical History:   Procedure Laterality Date     HYSTERECTOMY, PAP NO LONGER INDICATED         Social History     Tobacco Use     Smoking status: Never Smoker     Smokeless tobacco: Never Used   Substance Use Topics     Alcohol use: Yes     Alcohol/week: 2.0 standard drinks     Types: 2 Standard drinks or equivalent per week     Family History   Problem Relation Age of Onset     Unknown/Adopted Father      Asthma Mother      Unknown/Adopted Mother          Current Outpatient Medications   Medication Sig Dispense Refill     albuterol (PROAIR HFA/PROVENTIL HFA/VENTOLIN HFA) 108 (90 Base) MCG/ACT inhaler Inhale 2 puffs into the lungs 4 times daily (Patient taking differently: Inhale 2 puffs into the lungs every 4 hours as needed (With colds) ) 1 Inhaler 3     ALPRAZolam (XANAX) 1 MG tablet Take 1 tablet (1 mg) by mouth nightly as needed for anxiety or sleep 30 tablet 0     buPROPion (WELLBUTRIN XL) 300 MG 24 hr tablet TAKE 1 TABLET (300 MG) BY MOUTH EVERY MORNING 90 tablet 1     cetirizine-pseudoePHEDrine ER (ZYRTEC-D) 5-120 MG 12 hr tablet TAKE 1 TABLET TWICE DAILY 60 tablet 1     conjugated estrogens (PREMARIN) 0.625 MG/GM vaginal cream Place 0.5 g vaginally twice a week 42.5 g 12     estradiol (ESTRACE) 0.5 MG tablet Take 1 tablet (0.5 mg) by mouth daily 90 tablet 3     levothyroxine (SYNTHROID/LEVOTHROID) 125 MCG tablet Take 1 tablet (125 mcg) by mouth daily 90 tablet 3     lisinopril-hydrochlorothiazide (ZESTORETIC) 20-12.5 MG tablet Take 1 tablet by mouth daily 90 tablet  3     potassium chloride ER (MICRO-K) 10 MEQ CR capsule Take 2 capsules (20 mEq) by mouth daily 180 capsule 1     sertraline (ZOLOFT) 100 MG tablet Take 1.5 tablets (150 mg) by mouth daily 135 tablet 1     simvastatin (ZOCOR) 10 MG tablet Take 1 tablet (10 mg) by mouth At Bedtime 90 tablet 3     triamcinolone (NASACORT ALLERGY 24HR) 55 MCG/ACT nasal aerosol Spray 2 sprays into both nostrils daily 3 Bottle 3     VITAMIN D3 1000 UNITS tablet TAKE 1 TABLET (1,000 UNITS) BY MOUTH DAILY 90 tablet 3       OBJECTIVE:                                                    Physical Exam:    /76   Pulse 78   Temp 97.9  F (36.6  C) (Skin)   Resp 16   Wt 73 kg (161 lb)   SpO2 98%   BMI 25.60 kg/m      CONSTITUTIONAL: Alert non-toxic appearing female in no acute distress  HEAD: Normocephalic, atraumatic  RESPIRATORY: Lungs clear to auscultation, respirations unlabored  CV: Regular rate and rhythm, S1S2, no clicks, murmurs, rubs, or gallops; bilateral lower extremities without edema, posterior tibialis pulses 2+ bilaterally  GASTROINTESTINAL: Normoactive bowel sounds x4 quadrants, abdomen soft, non-distended- tender over LUQ, no palpable hepatomegaly or splenomegaly. No rebound tenderness, guarding, or rigidity.  MUSCULOSKELETAL: Moves all extremities, no obvious muscle wasting; mild L CVA tenderness  NEUROLOGIC: No gross deficits, normal gait  SKIN: Appropriate color for race, warm and dry, no suspicious lesions or rashes  PSYCHIATRIC: Pleasant and interactive, affect euthymic, makes appropriate eye contact, thought process logical       ASSESSMENT/PLAN:                                                        Radha was seen today for recheck.    Diagnoses and all orders for this visit:    LUQ abdominal pain  -     Urinalysis w/reflex protein, bili ([facility])  -     CBC with Diff/Plt ([facility])  -     Basic Metabolic Panel (8) ([facility])  -     Referral to [facility]  -     D-Dimer ([facility])    LUQ abdominal pain present at  all times and stable over the past 5-6 months. Does worsen at times, unclear triggers. She appears clinically well, is afebrile, hemodynamically stable, without evidence of peritonitis. Question spleen in particular as a cause. Given that this has not improved at all over 5-6 months, obtain CT abdomen/pelvis- her mother does have a hx of peritoneal cancer. Differential though remains broad- obtain D-dimer given her 30 year hx of hormone replacement therapy. Obtain UA and BMP given CVA tenderness. Reviewed red flags and when to seek emergency care.    Regarding hormone therapy, after discussion of risks, benefits, and indications, we discussed weaning off of this as she is 10 years out from the average age of menopause.    Seasonal allergic rhinitis due to pollen  -     triamcinolone (NASACORT ALLERGY 24HR) 55 MCG/ACT nasal aerosol; Spray 2 sprays into both nostrils daily    Essential hypertension with goal blood pressure less than 130/80  -     potassium chloride ER (MICRO-K) 10 MEQ CR capsule; Take 2 capsules (20 mEq) by mouth daily        Patient needs assistance with ADLs: none identified today  Patient needs assistance with iADLs: none identified today    The following health maintenance items are reviewed in Epic and correct as of today:  Health Maintenance   Topic Date Due     ADVANCE CARE PLANNING  1960     ASTHMA ACTION PLAN  11/15/2018     PREVENTIVE CARE VISIT  03/05/2020     Pneumococcal Vaccine: Pediatrics (0 to 5 Years) and At-Risk Patients (6 to 64 Years) (1 of 1 - PPSV23) 11/30/2020     ASTHMA CONTROL TEST  12/10/2020     PHQ-9  12/10/2020     DTAP/TDAP/TD IMMUNIZATION (2 - Td) 03/11/2021     MAMMO SCREENING  09/04/2022     COLORECTAL CANCER SCREENING  12/27/2022     LIPID  08/24/2025     HEPATITIS C SCREENING  Completed     DEPRESSION ACTION PLAN  Completed     INFLUENZA VACCINE  Completed     ZOSTER IMMUNIZATION  Completed     IPV IMMUNIZATION  Aged Out     MENINGITIS IMMUNIZATION  Aged Out      HEPATITIS B IMMUNIZATION  Aged Out     HIV SCREENING  Discontinued     PAP  Discontinued       Patient Instructions     CT of the abdomen and pelvis to eval for the source of your localized, constant pain    Checking certain labs and urine today as well    Heat may be soothing    My Asthma Action Plan    Name: Radha Bobo   YOB: 1960  Date: 10/12/2020   My doctor: LUPE Borges CNP   My clinic: [facility]        My Rescue Medicine:   Albuterol inhaler (Proair/Ventolin/Proventil HFA)  2-4 puffs EVERY 4 HOURS as needed. Use a spacer if recommended by your provider.   My Asthma Severity:   Intermittent / Exercise Induced  Know your asthma triggers: smoke and upper respiratory infections             GREEN ZONE   Good Control    I feel good    No cough or wheeze    Can work, sleep and play without asthma symptoms       Take your asthma control medicine every day.     1. If exercise triggers your asthma, take your rescue medication    15 minutes before exercise or sports, and    During exercise if you have asthma symptoms  2. Spacer to use with inhaler: If you have a spacer, make sure to use it with your inhaler             YELLOW ZONE Getting Worse  I have ANY of these:    I do not feel good    Cough or wheeze    Chest feels tight    Wake up at night   1. Keep taking your Green Zone medications  2. Start taking your rescue medicine:    every 20 minutes for up to 1 hour. Then every 4 hours for 24-48 hours.  3. If you stay in the Yellow Zone for more than 12-24 hours, contact your doctor.  4. If you do not return to the Green Zone in 12-24 hours or you get worse, start taking your oral steroid medicine if prescribed by your provider.           RED ZONE Medical Alert - Get Help  I have ANY of these:    I feel awful    Medicine is not helping    Breathing getting harder    Trouble walking or talking    Nose opens wide to breathe       1. Take your rescue medicine NOW  2. If your  provider has prescribed an oral steroid medicine, start taking it NOW  3. Call your doctor NOW  4. If you are still in the Red Zone after 20 minutes and you have not reached your doctor:    Take your rescue medicine again and    Call 911 or go to the emergency room right away    See your regular doctor within 2 weeks of an Emergency Room or Urgent Care visit for follow-up treatment.          Annual Reminders:  Meet with Asthma Educator,  Flu Shot in the Fall, consider Pneumonia Vaccination for patients with asthma (aged 19 and older).    Pharmacy:    [facility] - FAX ONLY  [id number] - [place], [place] - [address] [id number] - [place], [place] - [address] AT [facility] [address]    Electronically signed by LUPE Borges CNP   Date: 10/12/20                    Asthma Triggers  How To Control Things That Make Your Asthma Worse    Triggers are things that make your asthma worse.  Look at the list below to help you find your triggers and   what you can do about them. You can help prevent asthma flare-ups by staying away from your triggers.      Trigger                                                          What you can do   Cigarette Smoke  Tobacco smoke can make asthma worse. Do not allow smoking in your home, car or around you.  Be sure no one smokes at a child s day care or school.  If you smoke, ask your health care provider for ways to help you quit.  Ask family members to quit too.  Ask your health care provider for a referral to Quit Plan to help you quit smoking, or call 2-361-835-PLAN.     Colds, Flu, Bronchitis  These are common triggers of asthma. Wash your hands often.  Don t touch your eyes, nose or mouth.  Get a flu shot every year.     Dust Mites  These are tiny bugs that live in cloth or carpet. They are too small to see. Wash sheets and blankets in hot water every week.   Encase pillows and mattress in dust mite proof covers.  Avoid  having carpet if you can. If you have carpet, vacuum weekly.   Use a dust mask and HEPA vacuum.   Pollen and Outdoor Mold  Some people are allergic to trees, grass, or weed pollen, or molds. Try to keep your windows closed.  Limit time out doors when pollen count is high.   Ask you health care provider about taking medicine during allergy season.     Animal Dander  Some people are allergic to skin flakes, urine or saliva from pets with fur or feathers. Keep pets with fur or feathers out of your home.    If you can t keep the pet outdoors, then keep the pet out of your bedroom.  Keep the bedroom door closed.  Keep pets off cloth furniture and away from stuffed toys.     Mice, Rats, and Cockroaches  Some people are allergic to the waste from these pests.   Cover food and garbage.  Clean up spills and food crumbs.  Store grease in the refrigerator.   Keep food out of the bedroom.   Indoor Mold  This can be a trigger if your home has high moisture. Fix leaking faucets, pipes, or other sources of water.   Clean moldy surfaces.  Dehumidify basement if it is damp and smelly.   Smoke, Strong Odors, and Sprays  These can reduce air quality. Stay away from strong odors and sprays, such as perfume, powder, hair spray, paints, smoke incense, paint, cleaning products, candles and new carpet.   Exercise or Sports  Some people with asthma have this trigger. Be active!  Ask your doctor about taking medicine before sports or exercise to prevent symptoms.    Warm up for 5-10 minutes before and after sports or exercise.     Other Triggers of Asthma  Cold air:  Cover your nose and mouth with a scarf.  Sometimes laughing or crying can be a trigger.  Some medicines and food can trigger asthma.       General Information:     Today you had your appointment with Edna Kramer CNP  My hours are:     Monday 8 AM - 5 PM  Tuesday: 8 AM - 5 PM  Wednesday: 8 AM - 5 PM  Thursday: 8 AM - 5 PM     I am not in the office Fridays. Therefore, non  urgent calls received on Friday will be addressed when I am back in the office on Monday.      If lab work was done today as part of your evaluation you will generally be contacted via My Chart, mail, or phone with the results within 1-5 days. If there is an alarming result we will contact you by phone. Lab results come back at varying times- I generally wait until all labs are resulted before making comments on results. Please note labs are automatically released to My Chart once available.      If you need refills please contact your pharmacy. They will send a refill request to me to review. Please allow 3 business days for us to process all refill requests.      Please call or send a medical message with any questions or concerns    We are working to improve our digital reputation.  Would you please help us by reviewing our clinic on Google and/or Facebook?  These are links for filling out a review for the clinic:    [email or web address]    We truly appreciate you taking the time to do this.        LUPE Borges CNP  [facility]  Family Practice  [facility]  [phone]    For any issues my office # is [phone]

## 2020-10-12 NOTE — LETTER
My Asthma Action Plan    Name: Radha Bobo   YOB: 1960  Date: 10/12/2020   My doctor: LUPE Borges CNP   My clinic: [facility]        My Rescue Medicine:   Albuterol inhaler (Proair/Ventolin/Proventil HFA)  2-4 puffs EVERY 4 HOURS as needed. Use a spacer if recommended by your provider.   My Asthma Severity:   Intermittent / Exercise Induced  Know your asthma triggers: Patient is unaware of triggers             GREEN ZONE   Good Control    I feel good    No cough or wheeze    Can work, sleep and play without asthma symptoms       Take your asthma control medicine every day.     1. If exercise triggers your asthma, take your rescue medication    15 minutes before exercise or sports, and    During exercise if you have asthma symptoms  2. Spacer to use with inhaler: If you have a spacer, make sure to use it with your inhaler             YELLOW ZONE Getting Worse  I have ANY of these:    I do not feel good    Cough or wheeze    Chest feels tight    Wake up at night   1. Keep taking your Green Zone medications  2. Start taking your rescue medicine:    every 20 minutes for up to 1 hour. Then every 4 hours for 24-48 hours.  3. If you stay in the Yellow Zone for more than 12-24 hours, contact your doctor.  4. If you do not return to the Green Zone in 12-24 hours or you get worse, start taking your oral steroid medicine if prescribed by your provider.           RED ZONE Medical Alert - Get Help  I have ANY of these:    I feel awful    Medicine is not helping    Breathing getting harder    Trouble walking or talking    Nose opens wide to breathe       1. Take your rescue medicine NOW  2. If your provider has prescribed an oral steroid medicine, start taking it NOW  3. Call your doctor NOW  4. If you are still in the Red Zone after 20 minutes and you have not reached your doctor:    Take your rescue medicine again and    Call 911 or go to the emergency room right away    See your  regular doctor within 2 weeks of an Emergency Room or Urgent Care visit for follow-up treatment.          Annual Reminders:  Meet with Asthma Educator,  Flu Shot in the Fall, consider Pneumonia Vaccination for patients with asthma (aged 19 and older).    Pharmacy:    [facility]  [id number] - [place], [place] - [address]  [id number] - [place], [place] - [address] AT [facility] OF [address]    Electronically signed by LUPE Borges CNP   Date: 10/12/20                    Asthma Triggers  How To Control Things That Make Your Asthma Worse    Triggers are things that make your asthma worse.  Look at the list below to help you find your triggers and   what you can do about them. You can help prevent asthma flare-ups by staying away from your triggers.      Trigger                                                          What you can do   Cigarette Smoke  Tobacco smoke can make asthma worse. Do not allow smoking in your home, car or around you.  Be sure no one smokes at a child s day care or school.  If you smoke, ask your health care provider for ways to help you quit.  Ask family members to quit too.  Ask your health care provider for a referral to Quit Plan to help you quit smoking, or call 6-326-273-PLAN.     Colds, Flu, Bronchitis  These are common triggers of asthma. Wash your hands often.  Don t touch your eyes, nose or mouth.  Get a flu shot every year.     Dust Mites  These are tiny bugs that live in cloth or carpet. They are too small to see. Wash sheets and blankets in hot water every week.   Encase pillows and mattress in dust mite proof covers.  Avoid having carpet if you can. If you have carpet, vacuum weekly.   Use a dust mask and HEPA vacuum.   Pollen and Outdoor Mold  Some people are allergic to trees, grass, or weed pollen, or molds. Try to keep your windows closed.  Limit time out doors when pollen count is high.   Ask you health  care provider about taking medicine during allergy season.     Animal Dander  Some people are allergic to skin flakes, urine or saliva from pets with fur or feathers. Keep pets with fur or feathers out of your home.    If you can t keep the pet outdoors, then keep the pet out of your bedroom.  Keep the bedroom door closed.  Keep pets off cloth furniture and away from stuffed toys.     Mice, Rats, and Cockroaches  Some people are allergic to the waste from these pests.   Cover food and garbage.  Clean up spills and food crumbs.  Store grease in the refrigerator.   Keep food out of the bedroom.   Indoor Mold  This can be a trigger if your home has high moisture. Fix leaking faucets, pipes, or other sources of water.   Clean moldy surfaces.  Dehumidify basement if it is damp and smelly.   Smoke, Strong Odors, and Sprays  These can reduce air quality. Stay away from strong odors and sprays, such as perfume, powder, hair spray, paints, smoke incense, paint, cleaning products, candles and new carpet.   Exercise or Sports  Some people with asthma have this trigger. Be active!  Ask your doctor about taking medicine before sports or exercise to prevent symptoms.    Warm up for 5-10 minutes before and after sports or exercise.     Other Triggers of Asthma  Cold air:  Cover your nose and mouth with a scarf.  Sometimes laughing or crying can be a trigger.  Some medicines and food can trigger asthma.

## 2020-10-13 ENCOUNTER — TRANSFERRED RECORDS (OUTPATIENT)
Dept: FAMILY MEDICINE | Facility: CLINIC | Age: 60
End: 2020-10-13

## 2020-10-13 LAB
BUN SERPL-MCNC: 19 MG/DL (ref 8–27)
BUN/CREATININE RATIO: 25 (ref 12–28)
CALCIUM SERPL-MCNC: 9.7 MG/DL (ref 8.7–10.3)
CHLORIDE SERPLBLD-SCNC: 102 MMOL/L (ref 96–106)
CREAT SERPL-MCNC: 0.77 MG/DL (ref 0.57–1)
D DIMER MG/L FEU: 0.44 MG/L FEU (ref 0–0.49)
EGFR IF AFRICN AM: 97 ML/MIN/1.73
EGFR IF NONAFRICN AM: 84 ML/MIN/1.73
GLUCOSE SERPL-MCNC: 100 MG/DL (ref 65–99)
POTASSIUM SERPL-SCNC: 4.5 MMOL/L (ref 3.5–5.2)
SODIUM SERPL-SCNC: 138 MMOL/L (ref 134–144)
TOTAL CO2: 27 MMOL/L (ref 20–29)

## 2020-10-15 NOTE — RESULT ENCOUNTER NOTE
Reviewed results with Ana- she states she will focus on dietary changes to see if her symptoms resolve. She will trial low FODMAPS diet. To call if she would like to see GI for further eval.

## 2020-11-05 DIAGNOSIS — F33.1 MODERATE EPISODE OF RECURRENT MAJOR DEPRESSIVE DISORDER (H): ICD-10-CM

## 2020-11-05 RX ORDER — SERTRALINE HYDROCHLORIDE 100 MG/1
150 TABLET, FILM COATED ORAL DAILY
Qty: 135 TABLET | Refills: 1 | Status: SHIPPED | OUTPATIENT
Start: 2020-11-05 | End: 2021-07-05

## 2020-11-12 ENCOUNTER — MYC REFILL (OUTPATIENT)
Dept: FAMILY MEDICINE | Facility: CLINIC | Age: 60
End: 2020-11-12

## 2020-11-12 DIAGNOSIS — F43.0 ANXIETY IN ACUTE STRESS REACTION: ICD-10-CM

## 2020-11-12 DIAGNOSIS — F41.1 ANXIETY IN ACUTE STRESS REACTION: ICD-10-CM

## 2020-11-17 RX ORDER — ALPRAZOLAM 0.5 MG
.5-1 TABLET ORAL
Qty: 45 TABLET | Refills: 0 | Status: SHIPPED | OUTPATIENT
Start: 2020-11-17 | End: 2021-04-16

## 2020-12-13 ENCOUNTER — HEALTH MAINTENANCE LETTER (OUTPATIENT)
Age: 60
End: 2020-12-13

## 2020-12-23 ENCOUNTER — OFFICE VISIT (OUTPATIENT)
Dept: FAMILY MEDICINE | Facility: CLINIC | Age: 60
End: 2020-12-23

## 2020-12-23 ENCOUNTER — HOSPITAL ENCOUNTER (OUTPATIENT)
Dept: LAB | Facility: CLINIC | Age: 60
Discharge: HOME | End: 2020-12-23
Admitting: FAMILY MEDICINE
Payer: COMMERCIAL

## 2020-12-23 VITALS
WEIGHT: 165 LBS | TEMPERATURE: 98.3 F | SYSTOLIC BLOOD PRESSURE: 158 MMHG | DIASTOLIC BLOOD PRESSURE: 98 MMHG | BODY MASS INDEX: 26.23 KG/M2 | OXYGEN SATURATION: 98 % | HEART RATE: 78 BPM

## 2020-12-23 DIAGNOSIS — M79.661 PAIN AND SWELLING OF LOWER LEG, RIGHT: ICD-10-CM

## 2020-12-23 DIAGNOSIS — M79.89 PAIN AND SWELLING OF LOWER LEG, RIGHT: ICD-10-CM

## 2020-12-23 DIAGNOSIS — M79.89 PAIN AND SWELLING OF LOWER LEG, RIGHT: Primary | ICD-10-CM

## 2020-12-23 DIAGNOSIS — M79.661 PAIN AND SWELLING OF LOWER LEG, RIGHT: Primary | ICD-10-CM

## 2020-12-23 LAB — D DIMER PPP FEU-MCNC: 0.3 UG/ML FEU (ref 0–0.5)

## 2020-12-23 PROCEDURE — 36415 COLL VENOUS BLD VENIPUNCTURE: CPT | Performed by: FAMILY MEDICINE

## 2020-12-23 PROCEDURE — 85379 FIBRIN DEGRADATION QUANT: CPT | Performed by: FAMILY MEDICINE

## 2020-12-23 PROCEDURE — 99214 OFFICE O/P EST MOD 30 MIN: CPT | Performed by: FAMILY MEDICINE

## 2020-12-23 RX ORDER — ALPRAZOLAM 1 MG
TABLET ORAL
COMMUNITY
Start: 2020-07-17 | End: 2021-09-20

## 2020-12-23 NOTE — PROGRESS NOTES
SUBJECTIVE:                                                    Radha Bobo is a 60 year old female who presents to clinic today for evaluation.  Has had right popliteal swelling x 1 month.  Worse lately.  Pain 6/10.  No redness.  No history of DVT.  No prolonged immobility, recent travel, recent surgery or known hypercoagulability. No injury or trauma.  No CP or SOB.      Problem list, Medication list, Allergies, and Medical/Social/Surgical histories reviewed in EPIC and updated as appropriate.   Additional history: as documented    ROS:    A 10 system review was completed and is as noted in HPI and otherwise negative.      Histories:   Patient Active Problem List   Diagnosis     GERD (gastroesophageal reflux disease)     Restless leg syndrome     Colon adenoma     Anxiety     Moderate episode of recurrent major depressive disorder (H)     Essential hypertension with goal blood pressure less than 130/80     Chronic vasomotor rhinitis     Intermittent asthma, unspecified asthma severity, unspecified whether complicated     Other hyperlipidemia     Hypothyroidism due to acquired atrophy of thyroid     Past Surgical History:   Procedure Laterality Date     HYSTERECTOMY, PAP NO LONGER INDICATED         Social History     Tobacco Use     Smoking status: Never Smoker     Smokeless tobacco: Never Used   Substance Use Topics     Alcohol use: Yes     Alcohol/week: 2.0 standard drinks     Types: 2 Standard drinks or equivalent per week     Family History   Problem Relation Age of Onset     Unknown/Adopted Father      Asthma Mother      Unknown/Adopted Mother            OBJECTIVE:                                                    BP (!) 158/98   Pulse 78   Temp 98.3  F (36.8  C)   Wt 74.8 kg (165 lb)   SpO2 98%   BMI 26.23 kg/m    Body mass index is 26.23 kg/m .     General: Well appearing, NAD  Oropharynx: Clear  Heart: RRR, no murmur  Chest: Lungs CTAB  MSK: focal soft swelling right popliteal area.  No surrounding  erythema, induration or streaking  Skin: Clear without lesions or rash  Psych: Normal mood and affect         ASSESSMENT/PLAN:                                                        Pain and swelling of lower leg, right: favor popliteal cyst based on exam, DVT cannot be excluded.  Unable to obtain US outside of ER tonight or tomorrow or this weekend.  Discussed options.  Option d-dimer at hospital.  If elevated will go to ED.  If normal will obtain outpatient ultrasound next week unless she develops more concering sx.  -     US Lower Extremity Venous Duplex Right; Future  -     D-Dimer ([facility])        The following health maintenance items are reviewed in Epic and correct as of today:  Health Maintenance   Topic Date Due     ADVANCE CARE PLANNING  1960     PREVENTIVE CARE VISIT  03/05/2020     Pneumococcal Vaccine: Pediatrics (0 to 5 Years) and At-Risk Patients (6 to 64 Years) (1 of 1 - PPSV23) 11/30/2020     ASTHMA CONTROL TEST  12/10/2020     PHQ-9  12/10/2020     DTAP/TDAP/TD IMMUNIZATION (2 - Td) 03/11/2021     ASTHMA ACTION PLAN  10/12/2021     MAMMO SCREENING  09/04/2022     COLORECTAL CANCER SCREENING  12/27/2022     LIPID  08/24/2025     HEPATITIS C SCREENING  Completed     DEPRESSION ACTION PLAN  Completed     INFLUENZA VACCINE  Completed     ZOSTER IMMUNIZATION  Completed     IPV IMMUNIZATION  Aged Out     MENINGITIS IMMUNIZATION  Aged Out     HEPATITIS B IMMUNIZATION  Aged Out     HIV SCREENING  Discontinued     PAP  Discontinued         Tien Rosenthal MD  [facility]

## 2020-12-28 ENCOUNTER — TRANSFERRED RECORDS (OUTPATIENT)
Dept: FAMILY MEDICINE | Facility: CLINIC | Age: 60
End: 2020-12-28

## 2021-03-11 ENCOUNTER — OFFICE VISIT (OUTPATIENT)
Dept: FAMILY MEDICINE | Facility: CLINIC | Age: 61
End: 2021-03-11

## 2021-03-11 VITALS
BODY MASS INDEX: 25.11 KG/M2 | WEIGHT: 160 LBS | RESPIRATION RATE: 16 BRPM | OXYGEN SATURATION: 98 % | HEART RATE: 72 BPM | SYSTOLIC BLOOD PRESSURE: 124 MMHG | HEIGHT: 67 IN | DIASTOLIC BLOOD PRESSURE: 78 MMHG | TEMPERATURE: 97.7 F

## 2021-03-11 DIAGNOSIS — L91.8 ACQUIRED SKIN TAG: Primary | ICD-10-CM

## 2021-03-11 DIAGNOSIS — F33.1 MODERATE EPISODE OF RECURRENT MAJOR DEPRESSIVE DISORDER (H): ICD-10-CM

## 2021-03-11 DIAGNOSIS — J30.1 SEASONAL ALLERGIC RHINITIS DUE TO POLLEN: ICD-10-CM

## 2021-03-11 PROCEDURE — 99213 OFFICE O/P EST LOW 20 MIN: CPT | Mod: 25 | Performed by: NURSE PRACTITIONER

## 2021-03-11 PROCEDURE — 11200 RMVL SKIN TAGS UP TO&INC 15: CPT | Performed by: NURSE PRACTITIONER

## 2021-03-11 RX ORDER — TRIAMCINOLONE ACETONIDE 55 UG/1
2 SPRAY, METERED NASAL DAILY
Qty: 48 ML | Refills: 3 | Status: SHIPPED | OUTPATIENT
Start: 2021-03-11 | End: 2021-08-24

## 2021-03-11 ASSESSMENT — MIFFLIN-ST. JEOR: SCORE: 1315.45

## 2021-03-11 ASSESSMENT — ASTHMA QUESTIONNAIRES
QUESTION_3 LAST FOUR WEEKS HOW OFTEN DID YOUR ASTHMA SYMPTOMS (WHEEZING, COUGHING, SHORTNESS OF BREATH, CHEST TIGHTNESS OR PAIN) WAKE YOU UP AT NIGHT OR EARLIER THAN USUAL IN THE MORNING: NOT AT ALL
QUESTION_1 LAST FOUR WEEKS HOW MUCH OF THE TIME DID YOUR ASTHMA KEEP YOU FROM GETTING AS MUCH DONE AT WORK, SCHOOL OR AT HOME: NONE OF THE TIME
QUESTION_2 LAST FOUR WEEKS HOW OFTEN HAVE YOU HAD SHORTNESS OF BREATH: NOT AT ALL
QUESTION_5 LAST FOUR WEEKS HOW WOULD YOU RATE YOUR ASTHMA CONTROL: WELL CONTROLLED
QUESTION_4 LAST FOUR WEEKS HOW OFTEN HAVE YOU USED YOUR RESCUE INHALER OR NEBULIZER MEDICATION (SUCH AS ALBUTEROL): NOT AT ALL
ACT_TOTALSCORE: 24

## 2021-03-11 ASSESSMENT — PATIENT HEALTH QUESTIONNAIRE - PHQ9: SUM OF ALL RESPONSES TO PHQ QUESTIONS 1-9: 0

## 2021-03-11 NOTE — PATIENT INSTRUCTIONS
Keep area clean & dry  Okay to shower daily.   Cover with bacitracin or vaseline and a bandaid for first 3-5 days  Watch for signs of infection - redness, swelling, purulent drainage, increased pain and follow-up if you have any of these

## 2021-03-11 NOTE — PROGRESS NOTES
"Problem(s) Oriented visit        SUBJECTIVE:                                                    Radha Bobo is a 60 year old female who presents to clinic today for the following health issues :    Acquired skin tag upper bag becoming bothersome - itchy, tender when it gets caught on clothing. Has had these removed in the past. No redness, swelling, drainage from the area.    Seasonal allergies - Improving with Nasocort nasal spray and needs refill.    Depression - well controlled with current medications.    Fully vaccinated for Covid-19    Problem list, Medication list, Allergies, and Medical/Social/Surgical histories reviewed in EPIC and updated as appropriate.   Additional history: as documented    ROS:  5 point ROS completed and negative except noted above, including Gen, HENT, Skin, Neuro, Psych    OBJECTIVE:                                                    /78   Pulse 72   Temp 97.7  F (36.5  C)   Resp 16   Ht 1.689 m (5' 6.5\")   Wt 72.6 kg (160 lb)   SpO2 98%   BMI 25.44 kg/m    Body mass index is 25.44 kg/m .   GENERAL: healthy, alert and no distress  SKIN: Large skin colored skin tag upper mid back  PSYCH: mentation appears normal, affect normal/bright     ASSESSMENT/PLAN:                                                      Radha was seen today for derm problem.    Diagnoses and all orders for this visit:    Acquired skin tag  -     REMOVAL OF SKIN TAGS, FIRST 15  Patient signed consent for shave biopsy. Shave biopsy in typical fashion. Area cleaned with betadyne and anesthetized with 1% lidocaine with epi Then a dermablade was used to remove an area of her lesion  Bleeding was cauterized with silver nitrate. Covered with bacitracin and band aid. Patient tolerated procedure well.    Seasonal allergic rhinitis due to pollen  -     triamcinolone (NASACORT ALLERGY 24HR) 55 MCG/ACT nasal aerosol; Spray 2 sprays into both nostrils daily  Nasacort refilled    Moderate episode of recurrent " major depressive disorder (H)  Well controlled. Continue current medications without changes      See Patient Instructions  Patient Instructions   Keep area clean & dry  Okay to shower daily.   Cover with bacitracin or vaseline and a bandaid for first 3-5 days  Watch for signs of infection - redness, swelling, purulent drainage, increased pain and follow-up if you have any of these      LUPE Guajardo CNP  [facility] [place]  [phone]    For any issues my office # is [phone]

## 2021-03-12 ASSESSMENT — ASTHMA QUESTIONNAIRES: ACT_TOTALSCORE: 24

## 2021-03-29 DIAGNOSIS — N95.2 ATROPHIC VAGINITIS: ICD-10-CM

## 2021-03-29 DIAGNOSIS — J45.20 INTERMITTENT ASTHMA, UNSPECIFIED ASTHMA SEVERITY, UNSPECIFIED WHETHER COMPLICATED: ICD-10-CM

## 2021-03-29 RX ORDER — ALBUTEROL SULFATE 90 UG/1
2 AEROSOL, METERED RESPIRATORY (INHALATION) 4 TIMES DAILY
Qty: 18 G | Refills: 1 | Status: SHIPPED | OUTPATIENT
Start: 2021-03-29 | End: 2021-03-31

## 2021-03-31 DIAGNOSIS — J45.20 INTERMITTENT ASTHMA, UNSPECIFIED ASTHMA SEVERITY, UNSPECIFIED WHETHER COMPLICATED: ICD-10-CM

## 2021-03-31 RX ORDER — ALBUTEROL SULFATE 90 UG/1
2 AEROSOL, METERED RESPIRATORY (INHALATION) 4 TIMES DAILY
Qty: 54 G | Refills: 1 | Status: SHIPPED | OUTPATIENT
Start: 2021-03-31 | End: 2023-01-24

## 2021-04-14 DIAGNOSIS — I10 ESSENTIAL HYPERTENSION WITH GOAL BLOOD PRESSURE LESS THAN 130/80: ICD-10-CM

## 2021-04-14 RX ORDER — POTASSIUM CHLORIDE 750 MG/1
20 CAPSULE, EXTENDED RELEASE ORAL DAILY
Qty: 180 CAPSULE | Refills: 1 | Status: SHIPPED | OUTPATIENT
Start: 2021-04-14 | End: 2021-10-08

## 2021-04-15 ENCOUNTER — MYC MEDICAL ADVICE (OUTPATIENT)
Dept: FAMILY MEDICINE | Facility: CLINIC | Age: 61
End: 2021-04-15

## 2021-04-15 DIAGNOSIS — G47.00 INSOMNIA, UNSPECIFIED TYPE: Primary | ICD-10-CM

## 2021-04-16 RX ORDER — ALPRAZOLAM 1 MG
TABLET ORAL
Qty: 30 TABLET | Refills: 0 | Status: SHIPPED | OUTPATIENT
Start: 2021-04-16 | End: 2021-09-15

## 2021-04-16 NOTE — TELEPHONE ENCOUNTER
Last office visit 3/11/21 with Thao Boo CNP. Per [place]  prescription fill hx:  ALPRAZolam   Dispensed Days Supply Quantity Provider Pharmacy   ALPRAZOLAM 0.5MG TABLETS 11/17/2020 22 45 Units [facility]...   ALPRAZOLAM   TAB 1MG 07/17/2020 30 30 each  [id number] ...        MyChart message routed to Thao Boo CNP for review. Nona Savage

## 2021-04-16 NOTE — TELEPHONE ENCOUNTER
Xanax prescription sent to pharmacy. Message sent to patient via [facility].     LUPE Guajardo CNP on 4/16/2021 at 7:51 AM     normal (ped)...

## 2021-05-03 DIAGNOSIS — N95.1 MENOPAUSAL SYNDROME (HOT FLASHES): ICD-10-CM

## 2021-05-03 RX ORDER — ESTRADIOL 0.5 MG/1
TABLET ORAL
Qty: 90 TABLET | Refills: 3 | Status: SHIPPED | OUTPATIENT
Start: 2021-05-03 | End: 2022-03-29

## 2021-06-10 DIAGNOSIS — F33.1 MODERATE EPISODE OF RECURRENT MAJOR DEPRESSIVE DISORDER (H): ICD-10-CM

## 2021-06-14 RX ORDER — BUPROPION HYDROCHLORIDE 300 MG/1
TABLET ORAL
Qty: 90 TABLET | Refills: 1 | Status: SHIPPED | OUTPATIENT
Start: 2021-06-14 | End: 2021-09-20

## 2021-06-25 DIAGNOSIS — N95.2 ATROPHIC VAGINITIS: ICD-10-CM

## 2021-07-05 DIAGNOSIS — F33.1 MODERATE EPISODE OF RECURRENT MAJOR DEPRESSIVE DISORDER (H): ICD-10-CM

## 2021-07-05 RX ORDER — SERTRALINE HYDROCHLORIDE 100 MG/1
TABLET, FILM COATED ORAL
Qty: 135 TABLET | Refills: 1 | Status: SHIPPED | OUTPATIENT
Start: 2021-07-05 | End: 2022-01-03

## 2021-08-24 ENCOUNTER — TELEPHONE (OUTPATIENT)
Dept: FAMILY MEDICINE | Facility: CLINIC | Age: 61
End: 2021-08-24

## 2021-08-24 DIAGNOSIS — J45.20 INTERMITTENT ASTHMA, UNSPECIFIED ASTHMA SEVERITY, UNSPECIFIED WHETHER COMPLICATED: Primary | ICD-10-CM

## 2021-08-24 DIAGNOSIS — J30.2 SEASONAL ALLERGIC RHINITIS, UNSPECIFIED TRIGGER: ICD-10-CM

## 2021-08-24 RX ORDER — FLUTICASONE PROPIONATE 50 MCG
SPRAY, SUSPENSION (ML) NASAL
Qty: 15.8 ML | Refills: 6 | Status: SHIPPED | OUTPATIENT
Start: 2021-08-24 | End: 2022-02-25

## 2021-08-24 NOTE — TELEPHONE ENCOUNTER
Received fax from [employer] stating generic Nasacort is not covered under insurance.  Flonase is listed as alternative. Sent Flonase prescription to pharmacy to see if covered.  Called patient to inform her of change in prescription. She says she used Flonase in the past and not as effective but will try it again.

## 2021-09-15 DIAGNOSIS — G47.00 INSOMNIA, UNSPECIFIED TYPE: ICD-10-CM

## 2021-09-15 RX ORDER — ALPRAZOLAM 1 MG
TABLET ORAL
Qty: 30 TABLET | Refills: 0 | Status: SHIPPED | OUTPATIENT
Start: 2021-09-15 | End: 2022-02-25

## 2021-09-15 NOTE — TELEPHONE ENCOUNTER
PDMP Review       Value Time User    State PDMP site checked  Yes 9/15/2021  4:33 PM Thao Boo APRN CNP          Alprazolam refilled    LUPE Guajardo CNP on 9/15/2021 at 4:33 PM

## 2021-09-15 NOTE — TELEPHONE ENCOUNTER
alprazolam---last seen 3/11/21 (not related), patient is due for an appointment (i will call to scheduled)

## 2021-09-20 DIAGNOSIS — F33.1 MODERATE EPISODE OF RECURRENT MAJOR DEPRESSIVE DISORDER (H): ICD-10-CM

## 2021-09-20 RX ORDER — BUPROPION HYDROCHLORIDE 300 MG/1
TABLET ORAL
Qty: 90 TABLET | Refills: 0 | Status: SHIPPED | OUTPATIENT
Start: 2021-09-20 | End: 2022-02-07

## 2021-09-20 NOTE — CONFIDENTIAL NOTE
Received Advanced Refill Request today from [facility] for bupropion XL 300mg qd. Last available refill was filled on 9/10/21 for 90 day supply. This should last until 12/10/21.      Last related visit: 3/11/21 with Thao Boo CNP  PHQ 3/5/2019 6/10/2020 3/11/2021   PHQ-9 Total Score 2 2 0   Q9: Thoughts of better off dead/self-harm past 2 weeks Not at all Not at all Not at all     BLANCO-7 SCORE 11/15/2017 8/15/2018 3/5/2019   Total Score 1 11 0     Plan: per Thao Boo CNP, monica to send 90 day supply for patient to fill in December then plan to [facility] for annual visit in March.  Rx sent to pharmacy for #90 tabs with start date of 12/9/21  Davida Jaramillo RN

## 2021-09-26 ENCOUNTER — HEALTH MAINTENANCE LETTER (OUTPATIENT)
Age: 61
End: 2021-09-26

## 2021-10-04 DIAGNOSIS — J30.2 SEASONAL ALLERGIC RHINITIS, UNSPECIFIED TRIGGER: ICD-10-CM

## 2021-10-04 DIAGNOSIS — E03.9 ACQUIRED HYPOTHYROIDISM: ICD-10-CM

## 2021-10-04 RX ORDER — LEVOTHYROXINE SODIUM 125 UG/1
125 TABLET ORAL DAILY
Qty: 90 TABLET | Refills: 0 | Status: SHIPPED | OUTPATIENT
Start: 2021-10-04 | End: 2022-03-30

## 2021-10-08 DIAGNOSIS — I10 ESSENTIAL HYPERTENSION WITH GOAL BLOOD PRESSURE LESS THAN 130/80: ICD-10-CM

## 2021-10-08 RX ORDER — POTASSIUM CHLORIDE 750 MG/1
20 CAPSULE, EXTENDED RELEASE ORAL DAILY
Qty: 180 CAPSULE | Refills: 1 | Status: SHIPPED | OUTPATIENT
Start: 2021-10-08 | End: 2022-04-27

## 2021-10-11 DIAGNOSIS — E78.5 HYPERLIPIDEMIA LDL GOAL <100: ICD-10-CM

## 2021-10-11 RX ORDER — SIMVASTATIN 10 MG
10 TABLET ORAL AT BEDTIME
Qty: 90 TABLET | Refills: 0 | Status: SHIPPED | OUTPATIENT
Start: 2021-10-11 | End: 2022-01-03

## 2021-10-11 NOTE — TELEPHONE ENCOUNTER
simvastatin---last labs 8/2020--patient is due for an appointment.  LMTCB to schedule labs and a medication or physical.

## 2021-10-20 ENCOUNTER — OFFICE VISIT (OUTPATIENT)
Dept: FAMILY MEDICINE | Facility: CLINIC | Age: 61
End: 2021-10-20

## 2021-10-20 VITALS
SYSTOLIC BLOOD PRESSURE: 144 MMHG | RESPIRATION RATE: 18 BRPM | TEMPERATURE: 97.2 F | DIASTOLIC BLOOD PRESSURE: 87 MMHG | HEIGHT: 67 IN | BODY MASS INDEX: 26.46 KG/M2 | OXYGEN SATURATION: 96 % | WEIGHT: 168.6 LBS | HEART RATE: 66 BPM

## 2021-10-20 DIAGNOSIS — Z71.89 ADVANCED CARE PLANNING/COUNSELING DISCUSSION: ICD-10-CM

## 2021-10-20 DIAGNOSIS — J45.20 INTERMITTENT ASTHMA, UNSPECIFIED ASTHMA SEVERITY, UNSPECIFIED WHETHER COMPLICATED: ICD-10-CM

## 2021-10-20 DIAGNOSIS — E78.5 HYPERLIPIDEMIA LDL GOAL <100: ICD-10-CM

## 2021-10-20 DIAGNOSIS — I10 ESSENTIAL HYPERTENSION WITH GOAL BLOOD PRESSURE LESS THAN 130/80: Primary | ICD-10-CM

## 2021-10-20 DIAGNOSIS — Z23 NEED FOR TD VACCINE: ICD-10-CM

## 2021-10-20 DIAGNOSIS — E03.4 HYPOTHYROIDISM DUE TO ACQUIRED ATROPHY OF THYROID: ICD-10-CM

## 2021-10-20 DIAGNOSIS — Z23 NEED FOR PNEUMOCOCCAL VACCINATION: ICD-10-CM

## 2021-10-20 PROCEDURE — 93050 ART PRESSURE WAVEFORM ANALYS: CPT | Performed by: NURSE PRACTITIONER

## 2021-10-20 PROCEDURE — 90472 IMMUNIZATION ADMIN EACH ADD: CPT | Mod: 59 | Performed by: NURSE PRACTITIONER

## 2021-10-20 PROCEDURE — 90471 IMMUNIZATION ADMIN: CPT | Mod: 59 | Performed by: NURSE PRACTITIONER

## 2021-10-20 PROCEDURE — 90714 TD VACC NO PRESV 7 YRS+ IM: CPT | Performed by: NURSE PRACTITIONER

## 2021-10-20 PROCEDURE — 99214 OFFICE O/P EST MOD 30 MIN: CPT | Mod: 25 | Performed by: NURSE PRACTITIONER

## 2021-10-20 PROCEDURE — 36415 COLL VENOUS BLD VENIPUNCTURE: CPT | Performed by: NURSE PRACTITIONER

## 2021-10-20 PROCEDURE — 90732 PPSV23 VACC 2 YRS+ SUBQ/IM: CPT | Performed by: NURSE PRACTITIONER

## 2021-10-20 ASSESSMENT — PATIENT HEALTH QUESTIONNAIRE - PHQ9: SUM OF ALL RESPONSES TO PHQ QUESTIONS 1-9: 0

## 2021-10-20 ASSESSMENT — MIFFLIN-ST. JEOR: SCORE: 1349.45

## 2021-10-20 ASSESSMENT — ANXIETY QUESTIONNAIRES
5. BEING SO RESTLESS THAT IT IS HARD TO SIT STILL: NOT AT ALL
6. BECOMING EASILY ANNOYED OR IRRITABLE: NOT AT ALL
3. WORRYING TOO MUCH ABOUT DIFFERENT THINGS: NOT AT ALL
7. FEELING AFRAID AS IF SOMETHING AWFUL MIGHT HAPPEN: NOT AT ALL
IF YOU CHECKED OFF ANY PROBLEMS ON THIS QUESTIONNAIRE, HOW DIFFICULT HAVE THESE PROBLEMS MADE IT FOR YOU TO DO YOUR WORK, TAKE CARE OF THINGS AT HOME, OR GET ALONG WITH OTHER PEOPLE: NOT DIFFICULT AT ALL
GAD7 TOTAL SCORE: 3
1. FEELING NERVOUS, ANXIOUS, OR ON EDGE: SEVERAL DAYS
2. NOT BEING ABLE TO STOP OR CONTROL WORRYING: SEVERAL DAYS
4. TROUBLE RELAXING: SEVERAL DAYS

## 2021-10-20 ASSESSMENT — ASTHMA QUESTIONNAIRES
QUESTION_5 LAST FOUR WEEKS HOW WOULD YOU RATE YOUR ASTHMA CONTROL: COMPLETELY CONTROLLED
ACT_TOTALSCORE: 25
QUESTION_3 LAST FOUR WEEKS HOW OFTEN DID YOUR ASTHMA SYMPTOMS (WHEEZING, COUGHING, SHORTNESS OF BREATH, CHEST TIGHTNESS OR PAIN) WAKE YOU UP AT NIGHT OR EARLIER THAN USUAL IN THE MORNING: NOT AT ALL
ACUTE_EXACERBATION_TODAY: NO
QUESTION_4 LAST FOUR WEEKS HOW OFTEN HAVE YOU USED YOUR RESCUE INHALER OR NEBULIZER MEDICATION (SUCH AS ALBUTEROL): NOT AT ALL
QUESTION_2 LAST FOUR WEEKS HOW OFTEN HAVE YOU HAD SHORTNESS OF BREATH: NOT AT ALL
QUESTION_1 LAST FOUR WEEKS HOW MUCH OF THE TIME DID YOUR ASTHMA KEEP YOU FROM GETTING AS MUCH DONE AT WORK, SCHOOL OR AT HOME: NONE OF THE TIME

## 2021-10-20 NOTE — PROGRESS NOTES
"Problem(s) Oriented visit        SUBJECTIVE:                                                    Radha Bobo is a 61 year old female who presents to clinic today for the following health issues :    Hypertension - uncontrolled with Lisinopril-hydrochlorothiazide 20-12.5 mg daily. No medication side effects. Blood pressure 120's systolic at dentist a couple weeks ago. Under a lot of stress the last 2 days (yesterday was her birthday and 2 year anniversary of her son's death, today is son's birthday). Denies headaches, chest pain, pressure, palpitations, shortness of breath.  BP Readings from Last 3 Encounters:   10/20/21 (!) 144/87   03/11/21 124/78   12/23/20 (!) 158/98     Asthma - Uses albuterol as needed. Feels like asthma is well controlled until she gets a cold.   ACT Total Scores 6/10/2020 3/11/2021 10/20/2021   ACT TOTAL SCORE (Goal Greater than or Equal to 20) 24 24 25   In the past 12 months, how many times did you visit the emergency room for your asthma without being admitted to the hospital? 0 0 0   In the past 12 months, how many times were you hospitalized overnight because of your asthma? 0 0 0     Hyperlipidemia - Taking Simvastatin 10 mg daily without side effects  Recent Labs   Lab Test 08/24/20  1325 03/05/19  1217   CHOL 261* 203*   HDL 77 83   * 107*   TRIG 141 66     Hypothyroidism - Taking Levothyroxine 125 mcg daily. Denies temperature intolerance, bowel changes, etc.       Problem list, Medication list, Allergies, and Medical/Social/Surgical histories reviewed in EPIC and updated as appropriate.   Additional history: as documented    ROS:  7 point ROS completed and negative except noted above, including Gen, HENT, CV, Resp, Neurologic, Endocrine, Psych    OBJECTIVE:                                                    BP (!) 144/87   Pulse 66   Temp 97.2  F (36.2  C) (Temporal)   Resp 18   Ht 1.689 m (5' 6.5\")   Wt 76.5 kg (168 lb 9.6 oz)   SpO2 96%   BMI 26.81 kg/m    Body mass " "index is 26.81 kg/m .   GENERAL: healthy, alert and no distress  RESP: lungs clear to auscultation - no rales, rhonchi or wheezes  CV: regular rates and rhythm, normal S1 S2, no S3 or S4, no murmur, click or rub and no peripheral edema  SKIN: no suspicious lesions or rashes  PSYCH: affect normal/bright     ASSESSMENT/PLAN:                                                        BMI:   Estimated body mass index is 26.81 kg/m  as calculated from the following:    Height as of this encounter: 1.689 m (5' 6.5\").    Weight as of this encounter: 76.5 kg (168 lb 9.6 oz).   Weight management plan: Discussed healthy diet and exercise guidelines      Radha was seen today for recheck medication.    Diagnoses and all orders for this visit:    Essential hypertension with goal blood pressure less than 130/80  -     DC ART PRESS WAVEFORM ANALYS CENTRAL ART PRESSURE  -     Comp. Metabolic Panel (14) ([facility])  -     VENOUS COLLECTION  Reviewed current HTN management. Blood pressure is uncontrolled with current medication. Goal BP <130/80. Plan to increase dose of medication in 3 months at physical if BP still elevated. We today managed prescriptions with refills ensured to ensure availabilty of current medications. Reviewed lifestyle modifications. Required intervals for follow up on HTN, lab studies reviewed. Strongly recommened blood pressures are checked outside the clinic to ensure that BPs are remaining within guidelines. Instructed to contact me if the readings are not within guidelines on a regular basis so we can adjust treatment as needed. Reviewed side effects of medications, alarm signs and symptoms, and when to seek further care.    Intermittent asthma, unspecified asthma severity, unspecified whether complicated  -     VENOUS COLLECTION  Currently well controlled. Discussed using Flovent inhaler when she starts to have cold symptoms the next time. Patient will call/message when this happens.    Hyperlipidemia LDL " goal <100  -     Lipid Panel ([facility])  -     VENOUS COLLECTION  Discussed current lipid results, previous results (if available) current guidelines (NCEP) for treatment and goals for lipids.    Discussed ongoing lifestyle modification, dietary changes (low fat, low simple carb) and regular aerobic exercise.    Reviewed medication use for lipid lowering, including the statins are their possible side effects of myalgias, rhabdomyolysis, and liver toxicity.  We today managed his prescriptions with refills ensured to ensure availabilty of current medications.  Discussed the importance for aggressive management of dyslipidemia to prevent vascular complications later.     Instructed to contact me if she develop any intolerance to the treatment.     Hypothyroidism due to acquired atrophy of thyroid  -     TSH ([facility])  -     VENOUS COLLECTION  Checking TSH. Levothyroxine to be refilled/dose changed based off of results.    Need for pneumococcal vaccination  -     PNEUMOCOCCAL VACCINE,ADULT,SQ OR IM  -     VACCINE ADMINISTRATION, INITIAL  -     VENOUS COLLECTION    Need for Td vaccine  -     TD PRSERV FREE >=7 YRS ADS IM [[id number]]  -     IMMUNIATION ADMIN EACH ADDT'  -     VENOUS COLLECTION    Advanced care planning/counseling discussion  -     VENOUS COLLECTION        See Patient Instructions  Patient Instructions   Call/message when you start having cold symptoms and I will send over prescription for Flovent inhaler with spacer.    Goal blood pressure < 130/80. Plan to increase medication if still elevated in 3 months.  Lifestyle Modification Recommendations in Hypertension  1. Weight reduction: Maintain normal body weight (BMI = 18.5 - 24.9 kg/m2)   *Average SBP reduction rate: 5-20 mmHg per 10 kg weight loss  2. DASH eating plan: Adopt a diet rich in fruits, vegetables, and low-fat dairy products with reduced content of saturated and total fat.   *Average SBP reduction rate: 8-14 mmHg  3. Dietary sodium reduction:  Reduce dietary sodium intake to < 100 mmol/day (2.4 g sodium or 6 g sodium chloride).   *Average SBP reduction rate: 2-8 mmHg  4. Aerobic physical activity: Moderate to vigorous aerobic physical activity (e.g. Brisk walking) at least 40 min/day, 3-4 days of the week, no more than 48 hours without exercise.   *Average SBP reduction rate: 4-9 mmHg  5. Moderation of alcohol consumption:   Men - limit to < 2 drinks* per day.  Women and lighter-weight individuals - limit to < 1 drink* per day   *Average SBP reduction rate: 2-4 mmHg  (SBP = systolic blood pressure. SBP/DBP)        LUPE Gaujardo CNP  [facility]  Family Practice  [facility]  [phone]    For any issues my office # is [phone]

## 2021-10-20 NOTE — PATIENT INSTRUCTIONS
Call/message when you start having cold symptoms and I will send over prescription for Flovent inhaler with spacer.    Goal blood pressure < 130/80. Plan to increase medication if still elevated in 3 months.  Lifestyle Modification Recommendations in Hypertension  1. Weight reduction: Maintain normal body weight (BMI = 18.5 - 24.9 kg/m2)   *Average SBP reduction rate: 5-20 mmHg per 10 kg weight loss  2. DASH eating plan: Adopt a diet rich in fruits, vegetables, and low-fat dairy products with reduced content of saturated and total fat.   *Average SBP reduction rate: 8-14 mmHg  3. Dietary sodium reduction: Reduce dietary sodium intake to < 100 mmol/day (2.4 g sodium or 6 g sodium chloride).   *Average SBP reduction rate: 2-8 mmHg  4. Aerobic physical activity: Moderate to vigorous aerobic physical activity (e.g. Brisk walking) at least 40 min/day, 3-4 days of the week, no more than 48 hours without exercise.   *Average SBP reduction rate: 4-9 mmHg  5. Moderation of alcohol consumption:   Men - limit to < 2 drinks* per day.  Women and lighter-weight individuals - limit to < 1 drink* per day   *Average SBP reduction rate: 2-4 mmHg  (SBP = systolic blood pressure. SBP/DBP)

## 2021-10-21 LAB
ALBUMIN SERPL-MCNC: 4.5 G/DL (ref 3.8–4.8)
ALBUMIN/GLOB SERPL: 2 {RATIO} (ref 1.2–2.2)
ALP SERPL-CCNC: 83 IU/L (ref 44–121)
ALT SERPL-CCNC: 20 IU/L (ref 0–32)
AST SERPL-CCNC: 21 IU/L (ref 0–40)
BILIRUB SERPL-MCNC: <0.2 MG/DL (ref 0–1.2)
BUN SERPL-MCNC: 17 MG/DL (ref 8–27)
BUN/CREATININE RATIO: 22 (ref 12–28)
CALCIUM SERPL-MCNC: 10.1 MG/DL (ref 8.7–10.3)
CHLORIDE SERPLBLD-SCNC: 100 MMOL/L (ref 96–106)
CHOLEST SERPL-MCNC: 240 MG/DL (ref 100–199)
CREAT SERPL-MCNC: 0.77 MG/DL (ref 0.57–1)
EGFR IF AFRICN AM: 96 ML/MIN/1.73
EGFR IF NONAFRICN AM: 83 ML/MIN/1.73
GLOBULIN, TOTAL: 2.3 G/DL (ref 1.5–4.5)
GLUCOSE SERPL-MCNC: 97 MG/DL (ref 65–99)
HDLC SERPL-MCNC: 74 MG/DL
LDL/HDL RATIO: 2.1 RATIO (ref 0–3.2)
LDLC SERPL CALC-MCNC: 155 MG/DL (ref 0–99)
POTASSIUM SERPL-SCNC: 4.5 MMOL/L (ref 3.5–5.2)
PROT SERPL-MCNC: 6.8 G/DL (ref 6–8.5)
SODIUM SERPL-SCNC: 139 MMOL/L (ref 134–144)
TOTAL CO2: 27 MMOL/L (ref 20–29)
TRIGL SERPL-MCNC: 65 MG/DL (ref 0–149)
TSH BLD-ACNC: 3.03 UIU/ML (ref 0.45–4.5)
VLDLC SERPL CALC-MCNC: 11 MG/DL (ref 5–40)

## 2021-10-21 ASSESSMENT — ASTHMA QUESTIONNAIRES: ACT_TOTALSCORE: 25

## 2021-10-21 ASSESSMENT — ANXIETY QUESTIONNAIRES: GAD7 TOTAL SCORE: 3

## 2021-10-28 ENCOUNTER — HOSPITAL ENCOUNTER (OUTPATIENT)
Dept: MAMMOGRAPHY | Facility: CLINIC | Age: 61
Discharge: HOME | End: 2021-10-28
Attending: NURSE PRACTITIONER | Admitting: NURSE PRACTITIONER
Payer: COMMERCIAL

## 2021-10-28 DIAGNOSIS — Z12.31 VISIT FOR SCREENING MAMMOGRAM: ICD-10-CM

## 2021-10-28 PROCEDURE — 77063 BREAST TOMOSYNTHESIS BI: CPT

## 2022-01-02 DIAGNOSIS — F33.1 MODERATE EPISODE OF RECURRENT MAJOR DEPRESSIVE DISORDER (H): ICD-10-CM

## 2022-01-03 DIAGNOSIS — E78.5 HYPERLIPIDEMIA LDL GOAL <100: ICD-10-CM

## 2022-01-03 RX ORDER — SIMVASTATIN 10 MG
TABLET ORAL
Qty: 90 TABLET | Refills: 0 | Status: SHIPPED | OUTPATIENT
Start: 2022-01-03 | End: 2022-04-05

## 2022-01-03 RX ORDER — SERTRALINE HYDROCHLORIDE 100 MG/1
TABLET, FILM COATED ORAL
Qty: 135 TABLET | Refills: 1 | Status: SHIPPED | OUTPATIENT
Start: 2022-01-03 | End: 2022-04-11

## 2022-01-16 ENCOUNTER — HEALTH MAINTENANCE LETTER (OUTPATIENT)
Age: 62
End: 2022-01-16

## 2022-02-07 ENCOUNTER — MYC REFILL (OUTPATIENT)
Dept: FAMILY MEDICINE | Facility: CLINIC | Age: 62
End: 2022-02-07

## 2022-02-07 DIAGNOSIS — F33.1 MODERATE EPISODE OF RECURRENT MAJOR DEPRESSIVE DISORDER (H): ICD-10-CM

## 2022-02-07 RX ORDER — BUPROPION HYDROCHLORIDE 300 MG/1
TABLET ORAL
Qty: 90 TABLET | Refills: 0 | Status: SHIPPED | OUTPATIENT
Start: 2022-02-07 | End: 2022-05-09

## 2022-02-25 ENCOUNTER — OFFICE VISIT (OUTPATIENT)
Dept: FAMILY MEDICINE | Facility: CLINIC | Age: 62
End: 2022-02-25

## 2022-02-25 VITALS
BODY MASS INDEX: 26.2 KG/M2 | HEART RATE: 77 BPM | SYSTOLIC BLOOD PRESSURE: 128 MMHG | OXYGEN SATURATION: 98 % | WEIGHT: 163 LBS | HEIGHT: 66 IN | DIASTOLIC BLOOD PRESSURE: 82 MMHG

## 2022-02-25 DIAGNOSIS — E03.4 HYPOTHYROIDISM DUE TO ACQUIRED ATROPHY OF THYROID: ICD-10-CM

## 2022-02-25 DIAGNOSIS — Z00.00 ROUTINE GENERAL MEDICAL EXAMINATION AT A HEALTH CARE FACILITY: Primary | ICD-10-CM

## 2022-02-25 DIAGNOSIS — E78.49 OTHER HYPERLIPIDEMIA: ICD-10-CM

## 2022-02-25 DIAGNOSIS — G25.81 RESTLESS LEG SYNDROME: ICD-10-CM

## 2022-02-25 DIAGNOSIS — L98.9 SKIN LESION: ICD-10-CM

## 2022-02-25 DIAGNOSIS — F41.9 ANXIETY: ICD-10-CM

## 2022-02-25 DIAGNOSIS — G47.00 INSOMNIA, UNSPECIFIED TYPE: ICD-10-CM

## 2022-02-25 DIAGNOSIS — J45.20 INTERMITTENT ASTHMA, UNSPECIFIED ASTHMA SEVERITY, UNSPECIFIED WHETHER COMPLICATED: ICD-10-CM

## 2022-02-25 DIAGNOSIS — F33.1 MODERATE EPISODE OF RECURRENT MAJOR DEPRESSIVE DISORDER (H): ICD-10-CM

## 2022-02-25 DIAGNOSIS — L57.0 ACTINIC KERATOSIS: ICD-10-CM

## 2022-02-25 DIAGNOSIS — I10 ESSENTIAL HYPERTENSION WITH GOAL BLOOD PRESSURE LESS THAN 130/80: ICD-10-CM

## 2022-02-25 PROCEDURE — 17000 DESTRUCT PREMALG LESION: CPT | Performed by: NURSE PRACTITIONER

## 2022-02-25 PROCEDURE — 99396 PREV VISIT EST AGE 40-64: CPT | Mod: 25 | Performed by: NURSE PRACTITIONER

## 2022-02-25 RX ORDER — TRIAMCINOLONE ACETONIDE 55 UG/1
2 SPRAY, METERED NASAL DAILY
COMMUNITY
Start: 2022-02-25

## 2022-02-25 RX ORDER — ALPRAZOLAM 1 MG
TABLET ORAL
Qty: 30 TABLET | Refills: 0 | Status: SHIPPED | OUTPATIENT
Start: 2022-02-25 | End: 2022-05-19

## 2022-02-25 NOTE — PATIENT INSTRUCTIONS
Due for colonoscopy 12/2022    [facility]  [phone]  Dr. Hampton (Dr. Parra & Dr. Garcia are also great)    Preventive Health Recommendations  Female Ages 50 - 64    Yearly exam: See your health care provider every year in order to  o Review health changes.   o Discuss preventive care.    o Review your medicines if your doctor has prescribed any.      Get a Pap test every three years (unless you have an abnormal result and your provider advises testing more often).    If you get Pap tests with HPV test, you only need to test every 5 years, unless you have an abnormal result.     You do not need a Pap test if your uterus was removed (hysterectomy) and you have not had cancer.    You should be tested each year for STDs (sexually transmitted diseases) if you're at risk.     Have a mammogram every 1 to 2 years.    Have a colonoscopy at age 50, or have a yearly FIT test (stool test). These exams screen for colon cancer.      Have a cholesterol test every 5 years, or more often if advised.    Have a diabetes test (fasting glucose) every three years. If you are at risk for diabetes, you should have this test more often.     If you are at risk for osteoporosis (brittle bone disease), think about having a bone density scan (DEXA).    Shots: Get a flu shot each year. Get a tetanus shot every 10 years.    Nutrition:     Eat at least 5 servings of fruits and vegetables each day.    Eat whole-grain bread, whole-wheat pasta and brown rice instead of white grains and rice.    Get adequate Calcium and Vitamin D.     Lifestyle    Exercise at least 150 minutes a week (30 minutes a day, 5 days a week). This will help you control your weight and prevent disease.    Limit alcohol to one drink per day.    No smoking.     Wear sunscreen to prevent skin cancer.     See your dentist every six months for an exam and cleaning.    See your eye doctor every 1 to 2 years.    Thank you for coming in today!     We are working to improve  our digital reputation.  Would you please help us by reviewing our clinic on Google and/or Facebook?  These are links for filling out a review for the clinic:    [email or web address]    We truly appreciate you taking the time to do this.     General Information:    Today you had your appointment with Thao Boo CNP  My hours are:    Monday 8 AM - 5 PM  Wednesday: 8 AM - 5 PM  Thursday: 8 AM - 5 PM  Fridays: 8 AM - 5 PM    I am not in the office Tuesdays. Therefore non urgent calls received on Tuesday will be addressed when I am back in the office on Wednesday.     If lab work was done today as part of your evaluation you will generally be contacted via My Chart, mail, or phone with the results within 1-5 days. If there is an alarming result we will contact you by phone. Lab results come back at varying times, I generally wait until all labs are resulted before making comments on results. Please note labs are automatically released to My Chart once available.     If you need refills please contact your pharmacy They will send a refill request to me to review. Please allow 3 business days for us to process all refill requests.     Please call or send a medical message with any questions or concerns

## 2022-02-25 NOTE — PROGRESS NOTES
SUBJECTIVE:   CC: Radha Bobo is an 61 year old woman who presents for preventive health visit.     Patient has been advised of split billing requirements and indicates understanding: Yes  Healthy Habits:    Do you get at least three servings of calcium containing foods daily (dairy, green leafy vegetables, etc.)? yes    Amount of exercise or daily activities, outside of work: 7 day(s) per week    Problems taking medications regularly No    Medication side effects: No    Have you had an eye exam in the past two years? yes    Do you see a dentist twice per year? yes    Do you have sleep apnea, excessive snoring or daytime drowsiness?no    Today's PHQ-2 Score:   PHQ-2 ( 1999 Pfizer) 2/25/2022   Q1: Little interest or pleasure in doing things 0   Q2: Feeling down, depressed or hopeless 0   PHQ-2 Score 0     Abuse: Current or Past(Physical, Sexual or Emotional)- No  Do you feel safe in your environment? Yes    Hypertension - well controlled with current medications. Elevated 4 months ago in October, but that is always a stressful month for her. Takes potassium supplement due to hypokalemia with hydrochlorothiazide.  BP Readings from Last 3 Encounters:   02/25/22 128/82   10/20/21 (!) 144/87   03/11/21 124/78     Hypothyroidism - well controlled with Levothyroxine    Hyperlipidemia - Takes simvastatin 10 mg daily without side effects    Has not had to use Albuterol inhaler recently for intermittent asthma. Going to [place] next week and plans to bring inhaler with her since different climate can trigger her respiratory symptoms.    Takes Xanax 1/2 tab rarely as needed, usually at night for anxiety and restless leg syndrome. Does have depression and anxiety treated with Sertraline & Bupropion. Also sees grief counselor regularly. Taking the summer off of work.     Scaling skin on right forearm for several months.    Social History     Tobacco Use     Smoking status: Never Smoker     Smokeless tobacco: Never Used    Substance Use Topics     Alcohol use: Yes     Alcohol/week: 2.0 standard drinks     Types: 2 Standard drinks or equivalent per week     If you drink alcohol do you typically have >3 drinks per day or >7 drinks per week? No                     Reviewed orders with patient.  Reviewed health maintenance and updated orders accordingly - Yes  Lab work is in process  BP Readings from Last 3 Encounters:   02/25/22 128/82   10/20/21 (!) 144/87   03/11/21 124/78    Wt Readings from Last 3 Encounters:   02/25/22 73.9 kg (163 lb)   10/20/21 76.5 kg (168 lb 9.6 oz)   03/11/21 72.6 kg (160 lb)                  Patient Active Problem List   Diagnosis     Restless leg syndrome     Colon adenoma     Anxiety     Moderate episode of recurrent major depressive disorder (H)     Essential hypertension with goal blood pressure less than 130/80     Chronic vasomotor rhinitis     Intermittent asthma, unspecified asthma severity, unspecified whether complicated     Other hyperlipidemia     Hypothyroidism due to acquired atrophy of thyroid     Past Surgical History:   Procedure Laterality Date     HYSTERECTOMY, PAP NO LONGER INDICATED         Social History     Tobacco Use     Smoking status: Never Smoker     Smokeless tobacco: Never Used   Substance Use Topics     Alcohol use: Yes     Alcohol/week: 2.0 standard drinks     Types: 2 Standard drinks or equivalent per week     Family History   Problem Relation Age of Onset     Unknown/Adopted Father      Asthma Mother      Unknown/Adopted Mother          Current Outpatient Medications   Medication Sig Dispense Refill     albuterol (PROAIR HFA/PROVENTIL HFA/VENTOLIN HFA) 108 (90 Base) MCG/ACT inhaler Inhale 2 puffs into the lungs 4 times daily 54 g 1     ALPRAZolam (XANAX) 1 MG tablet Take 0.5-1 tablet (0.5-1 mg) by mouth at bedtime as needed for anxiety and sleep 30 tablet 0     buPROPion (WELLBUTRIN XL) 300 MG 24 hr tablet TAKE 1 TABLET (300 MG) BY MOUTH EVERY MORNING 90 tablet 0      conjugated estrogens (PREMARIN) 0.625 MG/GM vaginal cream Place 0.5 g vaginally twice a week 42.5 g 0     estradiol (ESTRACE) 0.5 MG tablet TAKE 1 TABLET BY MOUTH EVERY DAY 90 tablet 3     levothyroxine (SYNTHROID/LEVOTHROID) 125 MCG tablet Take 1 tablet (125 mcg) by mouth daily 90 tablet 0     lisinopril-hydrochlorothiazide (ZESTORETIC) 20-12.5 MG tablet Take 1 tablet by mouth daily 90 tablet 3     potassium chloride ER (MICRO-K) 10 MEQ CR capsule Take 2 capsules (20 mEq) by mouth daily 180 capsule 1     sertraline (ZOLOFT) 100 MG tablet TAKE 1.5 TABLETS BY MOUTH DAILY 135 tablet 1     simvastatin (ZOCOR) 10 MG tablet TAKE 1 TABLET BY MOUTH EVERYDAY AT BEDTIME 90 tablet 0     triamcinolone (NASACORT) 55 MCG/ACT nasal aerosol Spray 2 sprays into both nostrils daily       VITAMIN D3 1000 UNITS tablet TAKE 1 TABLET (1,000 UNITS) BY MOUTH DAILY 90 tablet 3     Allergies   Allergen Reactions     Biaxin [Clarithromycin]      Demerol      Erythromycin [Akne-Mycin]        S-7:   Breast CA Risk Assessment (FHS-7) 10/28/2021   Did any of your first-degree relatives have breast or ovarian cancer? Yes   Did any of your relatives have bilateral breast cancer? No   Did any man in your family have breast cancer? No   Did any woman in your family have breast and ovarian cancer? No   Did any woman in your family have breast cancer before age 50 y? Yes   Do you have 2 or more relatives with breast and/or ovarian cancer? Yes   Do you have 2 or more relatives with breast and/or bowel cancer? Yes       Mammogram Screening: Recommended mammography every 1-2 years with patient discussion and risk factor consideration  Pertinent mammograms are reviewed under the imaging tab.    Pertinent mammograms are reviewed under the imaging tab.  History of abnormal Pap smear: NO - age 30-65 PAP every 5 years with negative HPV co-testing recommended     Reviewed and updated as needed this visit by clinical staff   Tobacco  Allergies  Meds   "Problems  Med Hx  Surg Hx  Fam Hx          Reviewed and updated as needed this visit by Provider   Tobacco  Allergies  Meds  Problems  Med Hx  Surg Hx  Fam Hx             ROS:  CONSTITUTIONAL: NEGATIVE for fever, chills, change in weight  INTEGUMENTARY/SKIN: skin lesion  EYES: NEGATIVE for vision changes or irritation  ENT: NEGATIVE for ear, mouth and throat problems  RESP: NEGATIVE for significant cough or SOB  BREAST: NEGATIVE for masses, tenderness or discharge  CV: NEGATIVE for chest pain, palpitations or peripheral edema  GI: NEGATIVE for nausea, abdominal pain, heartburn, or change in bowel habits  : NEGATIVE for unusual urinary or vaginal symptoms. No vaginal bleeding.  MUSCULOSKELETAL: NEGATIVE for significant arthralgias or myalgia  NEURO: NEGATIVE for weakness, dizziness or paresthesias  PSYCHIATRIC: NEGATIVE for changes in mood or affect     OBJECTIVE:   /82   Pulse 77   Ht 1.664 m (5' 5.5\")   Wt 73.9 kg (163 lb)   SpO2 98%   BMI 26.71 kg/m    EXAM:  GENERAL APPEARANCE: healthy, alert and no distress  EYES: Eyes grossly normal to inspection, PERRL and conjunctivae and sclerae normal  HENT: ear canals and TM's normal, nose and mouth without ulcers or lesions, oropharynx clear and oral mucous membranes moist  NECK: no adenopathy, no asymmetry, masses, or scars and thyroid normal to palpation  RESP: lungs clear to auscultation - no rales, rhonchi or wheezes  BREAST: normal appearance bilaterally  CV: regular rate and rhythm, normal S1 S2, no S3 or S4, no murmur, click or rub, no peripheral edema and peripheral pulses strong  ABDOMEN: soft, nontender, no hepatosplenomegaly, no masses and bowel sounds normal  MS: no musculoskeletal defects are noted and gait is age appropriate without ataxia  SKIN: scaling lesion right forearm  NEURO: Normal strength and tone, sensory exam grossly normal, mentation intact and speech normal  PSYCH: normal affect & mood    ASSESSMENT/PLAN:   Radha was " seen today for physical.    Diagnoses and all orders for this visit:    Routine general medical examination at a health care facility  Age-appropriate preventative health maintenance along with diet, exercise and healthy weight discussed. Up to date on vaccinations. Plans to bring copy of advanced directives to clinic to have scanned    Essential hypertension with goal blood pressure less than 130/80  Reviewed current HTN management. Blood pressure is controlled with current medication(s). Goal BP <130/80. We today managed prescriptions with refills ensured to ensure availabilty of current medications. Reviewed lifestyle modifications. Required intervals for follow up on HTN, lab studies reviewed. Strongly recommened blood pressures are checked outside the clinic to ensure that BPs are remaining within guidelines. Instructed to contact me if the readings are not within guidelines on a regular basis so we can adjust treatment as needed. Reviewed side effects of medications, alarm signs and symptoms, and when to seek further care.    Hypothyroidism due to acquired atrophy of thyroid  Continue current dose of Levothyroxine    Other hyperlipidemia  Continue Simvastatin without changes. Healthy diet & exercise will help also    Intermittent asthma, unspecified asthma severity, unspecified whether complicated  Well controlled. Does not need refill of inhaler.    Restless leg syndrome  -     ALPRAZolam (XANAX) 1 MG tablet; Take 0.5-1 tablet (0.5-1 mg) by mouth at bedtime as needed for anxiety and sleep  PDMP Review       Value Time User    State PDMP site checked  Yes 2/25/2022 11:05 AM Thao Boo APRN CNP      Use sparingly. Refilled    Skin lesion  -     Adult Dermatology Referral; Future    Actinic keratosis  -     DESTRUCT PREMALIGNANT LESION, FIRST  Liquid nitrogen was applied for 10-12 seconds to the skin lesion and the expected blistering or scabbing reaction explained. Do not pick at the area. Patient  "reminded to expect hypopigmented scars from the procedure. Return if lesion fails to fully resolve.    Moderate episode of recurrent major depressive disorder (H)  Anxiety  -     ALPRAZolam (XANAX) 1 MG tablet; Take 0.5-1 tablet (0.5-1 mg) by mouth at bedtime as needed for anxiety and sleep  Also taking Bupropion & Sertraline which do not need refills at this time.    Insomnia, unspecified type        Patient has been advised of split billing requirements and indicates understanding: Yes  COUNSELING:   Reviewed preventive health counseling, as reflected in patient instructions  Special attention given to:        Regular exercise       Healthy diet/nutrition       Vision screening       Osteoporosis prevention/bone health       Colorectal Cancer Screening       (Carmen)menopause management       Advance Care Planning    Estimated body mass index is 26.71 kg/m  as calculated from the following:    Height as of this encounter: 1.664 m (5' 5.5\").    Weight as of this encounter: 73.9 kg (163 lb).    Weight management plan: Participating in [employer] weight loss program currently    She reports that she has never smoked. She has never used smokeless tobacco.      Counseling Resources:  ATP IV Guidelines  Pooled Cohorts Equation Calculator  Breast Cancer Risk Calculator  BRCA-Related Cancer Risk Assessment: FHS-7 Tool  FRAX Risk Assessment  ICSI Preventive Guidelines  Dietary Guidelines for Americans, 2010  USDA's MyPlate  ASA Prophylaxis  Lung CA Screening    LUPE Guajardo [facility]  "

## 2022-03-29 DIAGNOSIS — N95.1 MENOPAUSAL SYNDROME (HOT FLASHES): ICD-10-CM

## 2022-03-30 DIAGNOSIS — E03.9 ACQUIRED HYPOTHYROIDISM: ICD-10-CM

## 2022-03-30 RX ORDER — ESTRADIOL 0.5 MG/1
0.5 TABLET ORAL DAILY
Qty: 90 TABLET | Refills: 2 | Status: SHIPPED | OUTPATIENT
Start: 2022-03-30 | End: 2022-07-06

## 2022-03-30 RX ORDER — LEVOTHYROXINE SODIUM 125 UG/1
125 TABLET ORAL DAILY
Qty: 90 TABLET | Refills: 3 | Status: SHIPPED | OUTPATIENT
Start: 2022-03-30 | End: 2023-03-15

## 2022-03-30 NOTE — TELEPHONE ENCOUNTER
Levothyroxine 125 mg     LOV 2/25/22 cpx    Last thyroid   Ref Range & Units 5 mo ago   (10/20/21) 1 yr ago   (8/24/20) 2 yr ago   (8/28/19) 3 yr ago   (3/5/19) 3 yr ago   (8/15/18) 4 yr ago   (11/15/17) 4 yr ago   (4/11/17)      TSH 0.450 - 4.500 uIU/mL 3.030  2.270  1.370  4.990 High   4.150  2.450  3.560

## 2022-04-05 DIAGNOSIS — E78.5 HYPERLIPIDEMIA LDL GOAL <100: ICD-10-CM

## 2022-04-05 NOTE — TELEPHONE ENCOUNTER
Simvastatin 10 mg    LOV 2/25/22 cpx    Last lipds 10/20/21    Recent Labs   Lab Test 10/20/21  1145 08/24/20  1325   CHOL 240* 261*   HDL 74 77   * 156*   TRIG 65 141     It was so nice to see you at your appointment yesterday  I have summarized your lab results below  Normal thyroid testing (TSH)  Normal glucose level indicating no diabetes  Normal kidney and liver function and electrolytes (complete metabolic panel)  Cholesterol levels a bit improved compared to last year. Best way of improving this is by decreasing intake of saturated fat.     Please let us know if you have questions or concerns.     Sincerely,  LUPE Guajardo CNP

## 2022-04-06 RX ORDER — SIMVASTATIN 10 MG
TABLET ORAL
Qty: 90 TABLET | Refills: 1 | Status: SHIPPED | OUTPATIENT
Start: 2022-04-06 | End: 2022-10-14

## 2022-04-11 ENCOUNTER — OFFICE VISIT (OUTPATIENT)
Dept: FAMILY MEDICINE | Facility: CLINIC | Age: 62
End: 2022-04-11

## 2022-04-11 VITALS
HEART RATE: 75 BPM | TEMPERATURE: 98.1 F | DIASTOLIC BLOOD PRESSURE: 78 MMHG | BODY MASS INDEX: 26.16 KG/M2 | OXYGEN SATURATION: 97 % | WEIGHT: 162.8 LBS | HEIGHT: 66 IN | SYSTOLIC BLOOD PRESSURE: 138 MMHG | RESPIRATION RATE: 18 BRPM

## 2022-04-11 DIAGNOSIS — H00.011 HORDEOLUM EXTERNUM OF RIGHT UPPER EYELID: Primary | ICD-10-CM

## 2022-04-11 DIAGNOSIS — F33.1 MODERATE EPISODE OF RECURRENT MAJOR DEPRESSIVE DISORDER (H): ICD-10-CM

## 2022-04-11 DIAGNOSIS — I10 ESSENTIAL HYPERTENSION WITH GOAL BLOOD PRESSURE LESS THAN 130/80: ICD-10-CM

## 2022-04-11 DIAGNOSIS — J45.20 INTERMITTENT ASTHMA, UNSPECIFIED ASTHMA SEVERITY, UNSPECIFIED WHETHER COMPLICATED: ICD-10-CM

## 2022-04-11 PROCEDURE — 99214 OFFICE O/P EST MOD 30 MIN: CPT | Performed by: NURSE PRACTITIONER

## 2022-04-11 RX ORDER — POLYMYXIN B SULFATE AND TRIMETHOPRIM 1; 10000 MG/ML; [USP'U]/ML
1 SOLUTION OPHTHALMIC EVERY 4 HOURS
Qty: 2 ML | Refills: 0 | Status: SHIPPED | OUTPATIENT
Start: 2022-04-11 | End: 2022-04-14

## 2022-04-11 RX ORDER — SERTRALINE HYDROCHLORIDE 100 MG/1
100 TABLET, FILM COATED ORAL DAILY
Start: 2022-04-11 | End: 2023-09-11

## 2022-04-11 ASSESSMENT — ANXIETY QUESTIONNAIRES
GAD7 TOTAL SCORE: 1
1. FEELING NERVOUS, ANXIOUS, OR ON EDGE: SEVERAL DAYS
7. FEELING AFRAID AS IF SOMETHING AWFUL MIGHT HAPPEN: NOT AT ALL
2. NOT BEING ABLE TO STOP OR CONTROL WORRYING: NOT AT ALL
5. BEING SO RESTLESS THAT IT IS HARD TO SIT STILL: NOT AT ALL
IF YOU CHECKED OFF ANY PROBLEMS ON THIS QUESTIONNAIRE, HOW DIFFICULT HAVE THESE PROBLEMS MADE IT FOR YOU TO DO YOUR WORK, TAKE CARE OF THINGS AT HOME, OR GET ALONG WITH OTHER PEOPLE: NOT DIFFICULT AT ALL
3. WORRYING TOO MUCH ABOUT DIFFERENT THINGS: NOT AT ALL
6. BECOMING EASILY ANNOYED OR IRRITABLE: NOT AT ALL

## 2022-04-11 ASSESSMENT — ASTHMA QUESTIONNAIRES
QUESTION_2 LAST FOUR WEEKS HOW OFTEN HAVE YOU HAD SHORTNESS OF BREATH: NOT AT ALL
QUESTION_4 LAST FOUR WEEKS HOW OFTEN HAVE YOU USED YOUR RESCUE INHALER OR NEBULIZER MEDICATION (SUCH AS ALBUTEROL): NOT AT ALL
QUESTION_1 LAST FOUR WEEKS HOW MUCH OF THE TIME DID YOUR ASTHMA KEEP YOU FROM GETTING AS MUCH DONE AT WORK, SCHOOL OR AT HOME: NONE OF THE TIME
ACUTE_EXACERBATION_TODAY: NO
ACT_TOTALSCORE: 25
QUESTION_5 LAST FOUR WEEKS HOW WOULD YOU RATE YOUR ASTHMA CONTROL: COMPLETELY CONTROLLED
ACT_TOTALSCORE: 25
QUESTION_3 LAST FOUR WEEKS HOW OFTEN DID YOUR ASTHMA SYMPTOMS (WHEEZING, COUGHING, SHORTNESS OF BREATH, CHEST TIGHTNESS OR PAIN) WAKE YOU UP AT NIGHT OR EARLIER THAN USUAL IN THE MORNING: NOT AT ALL

## 2022-04-11 ASSESSMENT — PATIENT HEALTH QUESTIONNAIRE - PHQ9
5. POOR APPETITE OR OVEREATING: NOT AT ALL
SUM OF ALL RESPONSES TO PHQ QUESTIONS 1-9: 1

## 2022-04-11 NOTE — PROGRESS NOTES
"Problem(s) Oriented visit        SUBJECTIVE:                                                    Radha Bobo is a 61 year old female who presents to clinic today for the following health issues :    Right eye upper lid swelling, redness, itching started over 1 week ago. Using OTC \"Blink\" eye drops - moisture drops which have helped some. Denies any drainage from eye. Was ill prior to eye symptoms starting. No vision changes.     Asthma - well controlled  ACT Total Scores 3/11/2021 10/20/2021 4/11/2022   ACT TOTAL SCORE (Goal Greater than or Equal to 20) 24 25 25   In the past 12 months, how many times did you visit the emergency room for your asthma without being admitted to the hospital? 0 0 0   In the past 12 months, how many times were you hospitalized overnight because of your asthma? 0 0 0     Depression - well controlled with Sertraline - taking 100 mg daily.  PHQ 3/11/2021 10/20/2021 4/11/2022   PHQ-9 Total Score 0 0 1   Q9: Thoughts of better off dead/self-harm past 2 weeks Not at all Not at all Not at all     Problem list, Medication list, Allergies, and Medical/Social/Surgical histories reviewed in [facility] and updated as appropriate.   Additional history: as documented    ROS:  5 point ROS completed and negative except noted above, including Gen, HEENT, CV, Resp    OBJECTIVE:                                                    /78   Pulse 75   Temp 98.1  F (36.7  C) (Temporal)   Resp 18   Ht 1.664 m (5' 5.5\")   Wt 73.8 kg (162 lb 12.8 oz)   SpO2 97%   BMI 26.68 kg/m    Body mass index is 26.68 kg/m .   GENERAL: healthy, alert and no distress  EYES: erythema and mild swelling right upper eyelid with very small stye, no drainage of either eye or conjunctival erythema. PERRLA  HENT: normal cephalic/atraumatic, ear canals and TM's normal, nose and mouth without ulcers or lesions, oropharynx clear and oral mucous membranes moist  NECK: no adenopathy and no asymmetry, masses, or scars  RESP: lungs " clear to auscultation - no rales, rhonchi or wheezes  CV: regular rates and rhythm, normal S1 S2, no S3 or S4 and no murmur, click or rub  PSYCH: normal affect & mood     ASSESSMENT/PLAN:                                                      Radha was seen today for eye problem.    Diagnoses and all orders for this visit:    Hordeolum externum of right upper eyelid  -     trimethoprim-polymyxin b (POLYTRIM) 93887-1.1 UNIT/ML-% ophthalmic solution; Place 1 drop into the right eye every 4 hours for 3 days  Recommend hot packs and antibiotic drop. Follow-up with ophthalmology if worsening.    Intermittent asthma, unspecified asthma severity, unspecified whether complicated  Well controlled. No changes made today    Essential hypertension with goal blood pressure less than 130/80  Elevated more than usual today. Recommend checking home BP and following up if remains > 140/90    Moderate episode of recurrent major depressive disorder (H)  -     sertraline (ZOLOFT) 100 MG tablet; Take 1 tablet (100 mg) by mouth in the morning.  Patient self lowered dose to 100 mg daily. Updated medication list      See Patient Instructions  Patient Instructions   Polytrim eye drops - 1 drop in right eye every 4 hours during day for 3-5 days.     Follow-up with eye doctor if eye pain worsening    Omron blood pressure monitor with goal < 140/90      LUPE Guajardo CNP  [facility]  Family Practice  [facility]  [phone]    For any issues my office # is [phone]

## 2022-04-11 NOTE — PATIENT INSTRUCTIONS
Polytrim eye drops - 1 drop in right eye every 4 hours during day for 3-5 days.     Follow-up with eye doctor if eye pain worsening    Omron blood pressure monitor with goal < 140/90

## 2022-04-12 ASSESSMENT — ANXIETY QUESTIONNAIRES: GAD7 TOTAL SCORE: 1

## 2022-04-26 DIAGNOSIS — I10 ESSENTIAL HYPERTENSION WITH GOAL BLOOD PRESSURE LESS THAN 130/80: ICD-10-CM

## 2022-04-26 NOTE — TELEPHONE ENCOUNTER
Potassium  LOV 4/11/22  BP Readings from Last 3 Encounters:   04/11/22 138/78   02/25/22 128/82   10/20/21 (!) 144/87     Component      Latest Ref Rng & Units 10/20/2021   Glucose      65 - 99 mg/dL 97   Urea Nitrogen      8 - 27 mg/dL 17   Creatinine      0.57 - 1.00 mg/dL 0.77   eGFR If NonAfricn Am      >59 mL/min/1.73 83   eGFR If Africn Am      >59 mL/min/1.73 96   BUN/Creatinine Ratio      12 - 28 22   Sodium      134 - 144 mmol/L 139   Potassium      3.5 - 5.2 mmol/L 4.5   Chloride      96 - 106 mmol/L 100   Total CO2      20 - 29 mmol/L 27   Calcium      8.7 - 10.3 mg/dL 10.1   Protein Total      6.0 - 8.5 g/dL 6.8   Albumin      3.8 - 4.8 g/dL 4.5   Globulin, Total      1.5 - 4.5 g/dL 2.3   A/G Ratio      1.2 - 2.2 2.0   Bilirubin Total      0.0 - 1.2 mg/dL <0.2   Alkaline Phosphatase      44 - 121 IU/L 83   AST      0 - 40 IU/L 21   ALT      0 - 32 IU/L 20

## 2022-04-27 RX ORDER — POTASSIUM CHLORIDE 750 MG/1
20 CAPSULE, EXTENDED RELEASE ORAL DAILY
Qty: 180 CAPSULE | Refills: 1 | Status: SHIPPED | OUTPATIENT
Start: 2022-04-27 | End: 2022-10-19

## 2022-05-09 DIAGNOSIS — F33.1 MODERATE EPISODE OF RECURRENT MAJOR DEPRESSIVE DISORDER (H): ICD-10-CM

## 2022-05-09 RX ORDER — BUPROPION HYDROCHLORIDE 300 MG/1
TABLET ORAL
Qty: 90 TABLET | Refills: 0 | Status: SHIPPED | OUTPATIENT
Start: 2022-05-09 | End: 2022-12-08

## 2022-05-09 NOTE — TELEPHONE ENCOUNTER
Bupropion 300 mg 24 hr tab    LOV  4/11/22  Last labs  10/20/21    PHQ 3/11/2021 10/20/2021 4/11/2022   PHQ-9 Total Score 0 0 1   Q9: Thoughts of better off dead/self-harm past 2 weeks Not at all Not at all Not at all     No future appt    Moderate episode of recurrent major depressive disorder (H)  -     sertraline (ZOLOFT) 100 MG tablet; Take 1 tablet (100 mg) by mouth in the morning.  Patient self lowered dose to 100 mg daily. Updated medication list

## 2022-05-19 DIAGNOSIS — I10 ESSENTIAL HYPERTENSION WITH GOAL BLOOD PRESSURE LESS THAN 130/80: ICD-10-CM

## 2022-05-19 DIAGNOSIS — F41.9 ANXIETY: ICD-10-CM

## 2022-05-19 DIAGNOSIS — G25.81 RESTLESS LEG SYNDROME: ICD-10-CM

## 2022-05-19 RX ORDER — LISINOPRIL AND HYDROCHLOROTHIAZIDE 12.5; 2 MG/1; MG/1
1 TABLET ORAL DAILY
Qty: 90 TABLET | Refills: 3 | Status: SHIPPED | OUTPATIENT
Start: 2022-05-19 | End: 2023-04-03

## 2022-05-19 RX ORDER — ALPRAZOLAM 1 MG
TABLET ORAL
Qty: 30 TABLET | Refills: 0 | Status: SHIPPED | OUTPATIENT
Start: 2022-05-19 | End: 2022-08-25 | Stop reason: ALTCHOICE

## 2022-05-19 NOTE — TELEPHONE ENCOUNTER
PDMP Review       Value Time User    State PDMP site checked  Yes 5/19/2022  2:47 PM Thao Boo APRN CNP        Xanax refilled #30 tabs, 0 refills    LUPE Guajardo CNP on 5/19/2022 at 2:48 PM

## 2022-07-06 DIAGNOSIS — N95.1 MENOPAUSAL SYNDROME (HOT FLASHES): ICD-10-CM

## 2022-07-06 RX ORDER — ESTRADIOL 0.5 MG/1
0.5 TABLET ORAL DAILY
Qty: 90 TABLET | Refills: 2 | Status: SHIPPED | OUTPATIENT
Start: 2022-07-06 | End: 2023-05-11

## 2022-07-19 DIAGNOSIS — Z90.710 HISTORY OF HYSTERECTOMY: Primary | ICD-10-CM

## 2022-07-19 DIAGNOSIS — N95.2 ATROPHIC VAGINITIS: ICD-10-CM

## 2022-07-19 NOTE — TELEPHONE ENCOUNTER
Patient calls requesting refill on Premarin 0.625mg cream. Reports uses 0.5gm every other week to control vaginal dryness. Med was last ordered in June 2021. Also takes estradiol 0.5mg every day.   States has been on oral estrogen since had  hysterectomy at age 29 and added the Premarin in her mid 40's for the vaginal symptoms. This regimen has worked well for her.  Last related visit:  225/22 cpx with Thao Boo CNP   Plan: routed refill request to Thao Boo CNP for review.  Davida Jaramillo RN

## 2022-08-25 ENCOUNTER — OFFICE VISIT (OUTPATIENT)
Dept: FAMILY MEDICINE | Facility: CLINIC | Age: 62
End: 2022-08-25

## 2022-08-25 VITALS
WEIGHT: 164.25 LBS | OXYGEN SATURATION: 99 % | HEART RATE: 73 BPM | RESPIRATION RATE: 16 BRPM | BODY MASS INDEX: 26.92 KG/M2 | DIASTOLIC BLOOD PRESSURE: 80 MMHG | SYSTOLIC BLOOD PRESSURE: 128 MMHG

## 2022-08-25 DIAGNOSIS — H04.123 CHRONIC DRYNESS OF BOTH EYES: ICD-10-CM

## 2022-08-25 DIAGNOSIS — G25.81 RESTLESS LEG SYNDROME: ICD-10-CM

## 2022-08-25 DIAGNOSIS — F41.9 ANXIETY: ICD-10-CM

## 2022-08-25 DIAGNOSIS — E78.5 HYPERLIPIDEMIA LDL GOAL <100: Primary | ICD-10-CM

## 2022-08-25 LAB
CHOL/HDL RATIO (RMG): 3.4 MG/DL (ref 0–4.5)
CHOLESTEROL: 211 MG/DL (ref 100–199)
HDL (RMG): 61 MG/DL (ref 40–?)
LDL CALCULATED (RMG): 129 MG/DL (ref 0–130)
TRIGLYCERIDES (RMG): 105 MG/DL (ref 0–149)

## 2022-08-25 PROCEDURE — 80061 LIPID PANEL: CPT | Mod: QW | Performed by: NURSE PRACTITIONER

## 2022-08-25 PROCEDURE — 99214 OFFICE O/P EST MOD 30 MIN: CPT | Performed by: NURSE PRACTITIONER

## 2022-08-25 PROCEDURE — 36415 COLL VENOUS BLD VENIPUNCTURE: CPT | Performed by: NURSE PRACTITIONER

## 2022-08-25 RX ORDER — ALPRAZOLAM 1 MG
TABLET ORAL
Qty: 30 TABLET | Refills: 0 | Status: CANCELLED | OUTPATIENT
Start: 2022-08-25

## 2022-08-25 RX ORDER — CLONAZEPAM 1 MG/1
1 TABLET ORAL
Qty: 30 TABLET | Refills: 1 | Status: SHIPPED | OUTPATIENT
Start: 2022-08-25 | End: 2023-07-26

## 2022-08-25 NOTE — PROGRESS NOTES
Problem(s) Oriented visit        SUBJECTIVE:                                                    Radha Bobo is a 61 year old female who presents to clinic today for the following health issues :    Restless leg symptoms worse. Drinking plenty of water. Activity during the day does not affect symptoms. Taking Alprazolam 0.5-1 mg at bedtime occasionally to help with this, which works. Also has anxiety for which she uses this medication as needed.  Hyperlipidemia - taking Simvastatin 10 mg daily.  Saw eye specialist for dry eyes and started fish oil supplement and eye drops.   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    Problem list, Medication list, Allergies, and Medical/Social/Surgical histories reviewed in [facility] and updated as appropriate.   Additional history: as documented    ROS:  7 point ROS completed and negative except noted above, including Gen, Eyes, CV, Resp, MS, Neuro, Psych    OBJECTIVE:                                                    /80   Pulse 73   Resp 16   Wt 74.5 kg (164 lb 4 oz)   SpO2 99%   BMI 26.92 kg/m    Body mass index is 26.92 kg/m .   GENERAL: healthy, alert and no distress  EYES: Eyes grossly normal to inspection  MS: extremities normal- no gross deformities noted  SKIN: no suspicious lesions or rashes  NEURO: grossly intact  PSYCH: normal affect & mood     ASSESSMENT/PLAN:                                                      Radha was seen today for recheck medication.    Diagnoses and all orders for this visit:    Hyperlipidemia LDL goal <100  -     Lipid Profile ([facility])  -     VENOUS COLLECTION  Fasting for lipid panel today    Restless leg syndrome  -     Ferritin  Serum ([facility])  -     Iron and TIBC ([facility])  -     Basic Metabolic Panel (8) ([facility])  -     Magnesium  Serum ([facility])  -     Phosphorus  Serum ([facility])  -     clonazePAM (KLONOPIN) 1 MG tablet; Take 1 tablet (1 mg) by mouth nightly as needed for anxiety or sleep (restless legs)  -     VENOUS  COLLECTION  Checking labs for potential causes of worsening restless leg symptoms. Encourage drinking at least 64 oz water daily, trying magnesium supplement at bedtime. Clonazepam prn but could also try Mirtazapine.    Anxiety  -     clonazePAM (KLONOPIN) 1 MG tablet; Take 1 tablet (1 mg) by mouth nightly as needed for anxiety or sleep (restless legs)  -     VENOUS COLLECTION  Changing benzodiazepine to the one she used to use. Discusssed using to treat the acute symptoms of anxiety/stress, but do little to treat the underlying cause. May consider increasing sertraline dose if needing more regularly. We reviewed the differences in half life of the different benzos  PDMP Review       Value Time User    State PDMP site checked  Yes 8/25/2022  9:00 AM Thao Boo APRN CNP        Chronic dryness of both eyes  Sees eye doctor. Taking fish oil and using drops with good effect      See Patient Instructions  Patient Instructions   Magnesium 250-500 mg at bedtime to help with muscle relaxation, sleep    Checking labs - iron, electrolytes for causes of restless legs    May try Mirtazapine (Remeron) for restless legs if labs normal instead of Alprazolam      LUPE Guajardo CNP  [facility]  Family Practice  [facility]  [phone]    For any issues my office # is [phone]

## 2022-08-25 NOTE — PATIENT INSTRUCTIONS
Magnesium 250-500 mg at bedtime to help with muscle relaxation, sleep    Checking labs - iron, electrolytes for causes of restless legs    May try Mirtazapine (Remeron) for restless legs if labs normal instead of Alprazolam

## 2022-08-26 LAB
BUN SERPL-MCNC: 27 MG/DL (ref 8–27)
BUN/CREATININE RATIO: 32 (ref 12–28)
CALCIUM SERPL-MCNC: 9.3 MG/DL (ref 8.7–10.3)
CHLORIDE SERPLBLD-SCNC: 101 MMOL/L (ref 96–106)
CREAT SERPL-MCNC: 0.85 MG/DL (ref 0.57–1)
EGFR: 77 ML/MIN/1.73
FERRITIN SERPL-MCNC: 63 NG/ML (ref 15–150)
GLUCOSE SERPL-MCNC: 101 MG/DL (ref 65–99)
IRON BINDING CAP: 350 UG/DL (ref 250–450)
IRON SATURATION: 18 % (ref 15–55)
IRON: 63 UG/DL (ref 27–139)
MAGNESIUM SERPL-MCNC: 2.2 MG/DL (ref 1.6–2.3)
PHOSPHATE SERPL-MCNC: 3.4 MG/DL (ref 3–4.3)
POTASSIUM SERPL-SCNC: 4.2 MMOL/L (ref 3.5–5.2)
SODIUM SERPL-SCNC: 138 MMOL/L (ref 134–144)
TOTAL CO2: 24 MMOL/L (ref 20–29)
UIBC: 287 UG/DL (ref 118–369)

## 2022-09-16 ENCOUNTER — MYC MEDICAL ADVICE (OUTPATIENT)
Dept: FAMILY MEDICINE | Facility: CLINIC | Age: 62
End: 2022-09-16

## 2022-09-16 DIAGNOSIS — G47.00 INSOMNIA, UNSPECIFIED TYPE: Primary | ICD-10-CM

## 2022-09-19 NOTE — TELEPHONE ENCOUNTER
Sleep study ordered. Message sent to patient via [place].     LUPE Guajardo CNP on 9/19/2022 at 10:36 AM

## 2022-10-14 DIAGNOSIS — E78.5 HYPERLIPIDEMIA LDL GOAL <100: ICD-10-CM

## 2022-10-14 RX ORDER — SIMVASTATIN 10 MG
TABLET ORAL
Qty: 90 TABLET | Refills: 1 | Status: SHIPPED | OUTPATIENT
Start: 2022-10-14 | End: 2023-04-13

## 2022-10-14 NOTE — TELEPHONE ENCOUNTER
Simvastatin. Last addressed 8/25/22.        Component Ref Range & Units 1 mo ago     CHOLESTEROL 100 - 199 mg/dl 211 Abnormal      HDL 40 mg/dl 61     TRIGLYCERIDES ([facility]) 0 - 149 mg/dl 105     LDL CALCULATED ([facility]) 0 - 130 mg/dl 129     CHOL/HDL RATIO ([facility]) 0.0 - 4.5 mg/dl 3.4    Resulting Agency  [facility]

## 2022-10-18 DIAGNOSIS — I10 ESSENTIAL HYPERTENSION WITH GOAL BLOOD PRESSURE LESS THAN 130/80: ICD-10-CM

## 2022-10-18 NOTE — TELEPHONE ENCOUNTER
POTASSIUM     LOV: 8/25/22    Last Comprehensive Metabolic Panel:  Sodium   Date Value Ref Range Status   08/25/2022 138 134 - 144 mmol/L Final     Potassium   Date Value Ref Range Status   08/25/2022 4.2 3.5 - 5.2 mmol/L Final     Chloride   Date Value Ref Range Status   08/25/2022 101 96 - 106 mmol/L Final     Glucose   Date Value Ref Range Status   08/25/2022 101 (H) 65 - 99 mg/dL Final     Urea Nitrogen   Date Value Ref Range Status   08/25/2022 27 8 - 27 mg/dL Final     BUN/Creatinine Ratio   Date Value Ref Range Status   08/25/2022 32 (H) 12 - 28 Final     Creatinine   Date Value Ref Range Status   08/25/2022 0.85 0.57 - 1.00 mg/dL Final     Calcium   Date Value Ref Range Status   08/25/2022 9.3 8.7 - 10.3 mg/dL Final

## 2022-10-19 RX ORDER — POTASSIUM CHLORIDE 750 MG/1
20 CAPSULE, EXTENDED RELEASE ORAL DAILY
Qty: 180 CAPSULE | Refills: 1 | Status: SHIPPED | OUTPATIENT
Start: 2022-10-19 | End: 2023-04-12

## 2022-12-08 DIAGNOSIS — F33.1 MODERATE EPISODE OF RECURRENT MAJOR DEPRESSIVE DISORDER (H): ICD-10-CM

## 2022-12-08 RX ORDER — BUPROPION HYDROCHLORIDE 300 MG/1
TABLET ORAL
Qty: 90 TABLET | Refills: 1 | Status: SHIPPED | OUTPATIENT
Start: 2022-12-08 | End: 2023-04-03

## 2023-01-23 ENCOUNTER — MYC MEDICAL ADVICE (OUTPATIENT)
Dept: FAMILY MEDICINE | Facility: CLINIC | Age: 63
End: 2023-01-23

## 2023-01-24 ENCOUNTER — VIRTUAL VISIT (OUTPATIENT)
Dept: FAMILY MEDICINE | Facility: CLINIC | Age: 63
End: 2023-01-24

## 2023-01-24 DIAGNOSIS — U07.1 INFECTION DUE TO 2019 NOVEL CORONAVIRUS: ICD-10-CM

## 2023-01-24 DIAGNOSIS — J45.20 INTERMITTENT ASTHMA, UNSPECIFIED ASTHMA SEVERITY, UNSPECIFIED WHETHER COMPLICATED: ICD-10-CM

## 2023-01-24 DIAGNOSIS — I10 ESSENTIAL HYPERTENSION WITH GOAL BLOOD PRESSURE LESS THAN 130/80: Primary | ICD-10-CM

## 2023-01-24 PROCEDURE — 99213 OFFICE O/P EST LOW 20 MIN: CPT | Mod: 95 | Performed by: FAMILY MEDICINE

## 2023-01-24 RX ORDER — ALBUTEROL SULFATE 90 UG/1
2 AEROSOL, METERED RESPIRATORY (INHALATION) 4 TIMES DAILY
Qty: 54 G | Refills: 1 | Status: SHIPPED | OUTPATIENT
Start: 2023-01-24 | End: 2023-12-11

## 2023-01-24 NOTE — TELEPHONE ENCOUNTER
1/24/23 Patient called to talk about mychart message that she sent yesterday.  Thao is out of the office on Tuesdays.      Patient is scheduled for a video appointment 1/24/23 with Dr. Mcdowell and will address inhaler request at that time    Suresh Serrano,   [facility]  [phone]

## 2023-01-24 NOTE — PROGRESS NOTES
"Length of video call : 7 minutes    Patient location:  Home    Provider location:  [facility]     Mode of Communication:  Video Conference via VEASYT    This was a virtual video-visit conducted during COVID-19 outbreak in regulation with social distancing and quarantine recommendations of the CDC and [place] department of health and human services. A two way audio/video connection was used in real time with patient's consent.    SUBJECTIVE:                                                 Radha Bobo is a 62 year old female seen via video visit for the following health issues :    COVID 19 testing positive : 1/23/2023 (1 day ago)  Symptom onset: 1/21/2023 (3 days ago)  Symptoms: sinus pressure, sore throat (now improved), some shortness of breath. \"Feels like a really bad cold\". H/o viral induced wheeze.  Denies fevers or chills.     MASSBP: 1 (hypertension).    Has completed initial COVID 19 vaccine series. Most recent booster September 2022.       OBJECTIVE:                                                    No vitals taken due to telehealth visit     Resp effort = talking in full sentences. No audible  breathing.       Creatinine   Date Value Ref Range Status   08/25/2022 0.85 0.57 - 1.00 mg/dL Final       ASSESSMENT/PLAN:                                                        Essential hypertension with goal blood pressure less than 130/80  Intermittent asthma, unspecified asthma severity, unspecified whether complicated  Infection due to 2019 novel coronavirus  -     albuterol (PROAIR HFA/PROVENTIL HFA/VENTOLIN HFA) 108 (90 Base) MCG/ACT inhaler; Inhale 2 puffs into the lungs 4 times daily  -     nirmatrelvir and ritonavir (PAXLOVID) therapy pack; Take 3 tablets by mouth 2 times daily for 5 days    Discussed current recommendations with Covid-19 virus including quarantine guidance, treatment options, symptomatic cares.    Discussed management with oral antivirals and in particular " Paxlovid.  Discussed this is for treatment of outpatients with mild-to-moderate COVID-19. 89% relative risk reduction in hospitalization. Patient is within 5 days of symptom onset.  No renal impairment.     Discussed :  How to take:  taking 300 mg nirmatrelvir (two 150 mg tablets) with 100 mg ritonavir one tablet, all three tablets taken together twice daily for 5 days.   Adverse events - bad taste, diarrhea, hypertension, and muscle aches  Drug interactions-  discussed holding statin while taking

## 2023-01-26 ENCOUNTER — MYC MEDICAL ADVICE (OUTPATIENT)
Dept: FAMILY MEDICINE | Facility: CLINIC | Age: 63
End: 2023-01-26

## 2023-03-15 ENCOUNTER — OFFICE VISIT (OUTPATIENT)
Dept: FAMILY MEDICINE | Facility: CLINIC | Age: 63
End: 2023-03-15

## 2023-03-15 VITALS
SYSTOLIC BLOOD PRESSURE: 126 MMHG | BODY MASS INDEX: 26.68 KG/M2 | DIASTOLIC BLOOD PRESSURE: 84 MMHG | OXYGEN SATURATION: 98 % | HEART RATE: 80 BPM | HEIGHT: 67 IN | WEIGHT: 170 LBS

## 2023-03-15 DIAGNOSIS — Z12.11 SCREEN FOR COLON CANCER: ICD-10-CM

## 2023-03-15 DIAGNOSIS — Z13.1 SCREENING FOR DIABETES MELLITUS: ICD-10-CM

## 2023-03-15 DIAGNOSIS — E03.9 ACQUIRED HYPOTHYROIDISM: ICD-10-CM

## 2023-03-15 DIAGNOSIS — M25.551 HIP PAIN, RIGHT: Primary | ICD-10-CM

## 2023-03-15 DIAGNOSIS — I10 ESSENTIAL HYPERTENSION WITH GOAL BLOOD PRESSURE LESS THAN 130/80: ICD-10-CM

## 2023-03-15 DIAGNOSIS — F33.1 MODERATE EPISODE OF RECURRENT MAJOR DEPRESSIVE DISORDER (H): ICD-10-CM

## 2023-03-15 DIAGNOSIS — M70.61 TROCHANTERIC BURSITIS OF RIGHT HIP: ICD-10-CM

## 2023-03-15 DIAGNOSIS — Z13.6 SCREENING FOR CARDIOVASCULAR CONDITION: ICD-10-CM

## 2023-03-15 DIAGNOSIS — Z12.31 ENCOUNTER FOR SCREENING MAMMOGRAM FOR MALIGNANT NEOPLASM OF BREAST: ICD-10-CM

## 2023-03-15 PROCEDURE — 99214 OFFICE O/P EST MOD 30 MIN: CPT | Performed by: NURSE PRACTITIONER

## 2023-03-15 PROCEDURE — 73502 X-RAY EXAM HIP UNI 2-3 VIEWS: CPT | Mod: RT | Performed by: NURSE PRACTITIONER

## 2023-03-15 RX ORDER — LEVOTHYROXINE SODIUM 125 UG/1
125 TABLET ORAL DAILY
Qty: 90 TABLET | Refills: 3 | Status: SHIPPED | OUTPATIENT
Start: 2023-03-15 | End: 2024-03-26

## 2023-03-15 ASSESSMENT — ANXIETY QUESTIONNAIRES
2. NOT BEING ABLE TO STOP OR CONTROL WORRYING: SEVERAL DAYS
5. BEING SO RESTLESS THAT IT IS HARD TO SIT STILL: NOT AT ALL
7. FEELING AFRAID AS IF SOMETHING AWFUL MIGHT HAPPEN: NOT AT ALL
6. BECOMING EASILY ANNOYED OR IRRITABLE: NOT AT ALL
GAD7 TOTAL SCORE: 2
1. FEELING NERVOUS, ANXIOUS, OR ON EDGE: SEVERAL DAYS
GAD7 TOTAL SCORE: 2
3. WORRYING TOO MUCH ABOUT DIFFERENT THINGS: NOT AT ALL

## 2023-03-15 ASSESSMENT — PATIENT HEALTH QUESTIONNAIRE - PHQ9
SUM OF ALL RESPONSES TO PHQ QUESTIONS 1-9: 0
5. POOR APPETITE OR OVEREATING: NOT AT ALL

## 2023-03-15 ASSESSMENT — ASTHMA QUESTIONNAIRES: ACT_TOTALSCORE: 25

## 2023-03-15 NOTE — PROGRESS NOTES
"Problem(s) Oriented visit        SUBJECTIVE:                                                    Radha Bobo is a 62 year old female who presents to clinic today for the following health issues :    Right hip pain x years but getting worse and more consistent. Clicking/popping. Sharp pain when bending over. Using lidocaine patches at night which have helped some.     Hypothyroid - taking Levothyroxine 125 mcg daily. Due for recheck    Hypertension well controlled with Lisinopril-hydrochlorothiazide 20-12.5 mg daily. Does take potassium supplement due to hypokalemia.  BP Readings from Last 3 Encounters:   03/15/23 126/84   08/25/22 128/80   04/11/22 138/78     Depression well managed with Sertraline 100 mg daily     Problem list, Medication list, Allergies, and Medical/Social/Surgical histories reviewed in EPIC and updated as appropriate.   Additional history: as documented    ROS:  7 point ROS completed and negative except noted above, including Gen, CV, Resp, GI, MS, Neuro, Psych    OBJECTIVE:                                                    /84   Pulse 80   Ht 1.689 m (5' 6.5\")   Wt 77.1 kg (170 lb)   SpO2 98%   BMI 27.03 kg/m    Body mass index is 27.03 kg/m .   GENERAL: healthy, alert and no distress  RESP: calm regular breathing, no cough  CV: normal rate, no edema  MS: lateral right hip pain over trochanter, pain with adduction  NEURO: Normal strength and tone, sensory exam grossly normal and mentation intact  PSYCH: normal affect & mood     ASSESSMENT/PLAN:                                                      Radha was seen today for hip pain.    Diagnoses and all orders for this visit:    Hip pain, right  -     XR Hip Right 2-3 Views  Trochanteric bursitis of right hip  Discussed with patient - conservative home cares with icing after activity, NSAID medications, home stretching handout given. If not improving in next 2-4 weeks, then recommend she see ortho specialist for injection. Will " follow-up if radiologist sees anything different on x-ray    Acquired hypothyroidism  -     levothyroxine (SYNTHROID/LEVOTHROID) 125 MCG tablet; Take 1 tablet (125 mcg) by mouth daily  -     TSH ([facility])  Checking labs today and will adjust Levothyroxine if indicated    Essential hypertension with goal blood pressure less than 130/80  Well controlled with goal < 140/90. No changes to current treatment plan    Moderate episode of recurrent major depressive disorder (H)  Well controlled. No changes to medication    Screening for cardiovascular condition  -     Radiology Referral; Future    Encounter for screening mammogram for malignant neoplasm of breast  -     MAMMO -  Screening Digital Bilateral ([facility]); Future    Screen for colon cancer  -     Colonoscopy Screening  Referral; Future    Screening for diabetes mellitus  -     Hemoglobin A1C ([facility])        See Patient Instructions  There are no Patient Instructions on file for this visit.    LUPE Guajardo [facility]  Family Practice  [facility]  [phone]    For any issues my office # is [phone]

## 2023-03-15 NOTE — NURSING NOTE
Patient left without labs - called and scheduled for 3/21/23  Jackeline Nieto MA March 15, 2023 12:08 PM

## 2023-03-21 DIAGNOSIS — E03.9 ACQUIRED HYPOTHYROIDISM: ICD-10-CM

## 2023-03-21 DIAGNOSIS — Z13.1 SCREENING FOR DIABETES MELLITUS: ICD-10-CM

## 2023-03-21 PROCEDURE — 36415 COLL VENOUS BLD VENIPUNCTURE: CPT | Performed by: NURSE PRACTITIONER

## 2023-03-22 LAB
HBA1C MFR BLD: 5.8 % (ref 4.8–5.6)
TSH BLD-ACNC: 3.13 UIU/ML (ref 0.45–4.5)

## 2023-03-26 ENCOUNTER — HEALTH MAINTENANCE LETTER (OUTPATIENT)
Age: 63
End: 2023-03-26

## 2023-03-27 ENCOUNTER — HOSPITAL ENCOUNTER (OUTPATIENT)
Dept: MAMMOGRAPHY | Facility: CLINIC | Age: 63
Discharge: HOME | End: 2023-03-27
Attending: NURSE PRACTITIONER | Admitting: NURSE PRACTITIONER
Payer: COMMERCIAL

## 2023-03-27 DIAGNOSIS — Z12.31 ENCOUNTER FOR SCREENING MAMMOGRAM FOR MALIGNANT NEOPLASM OF BREAST: ICD-10-CM

## 2023-03-27 PROCEDURE — 77067 SCR MAMMO BI INCL CAD: CPT

## 2023-04-03 DIAGNOSIS — F33.1 MODERATE EPISODE OF RECURRENT MAJOR DEPRESSIVE DISORDER (H): ICD-10-CM

## 2023-04-03 DIAGNOSIS — I10 ESSENTIAL HYPERTENSION WITH GOAL BLOOD PRESSURE LESS THAN 130/80: ICD-10-CM

## 2023-04-03 RX ORDER — LISINOPRIL AND HYDROCHLOROTHIAZIDE 12.5; 2 MG/1; MG/1
1 TABLET ORAL DAILY
Qty: 90 TABLET | Refills: 3 | Status: SHIPPED | OUTPATIENT
Start: 2023-04-03 | End: 2024-06-25

## 2023-04-03 RX ORDER — BUPROPION HYDROCHLORIDE 300 MG/1
TABLET ORAL
Qty: 90 TABLET | Refills: 1 | Status: SHIPPED | OUTPATIENT
Start: 2023-04-03 | End: 2023-10-12

## 2023-04-03 NOTE — TELEPHONE ENCOUNTER
Bupropion  Lisinopril- hydrochlorothiazide  LOV 3/15/23  Essential hypertension with goal blood pressure less than 130/80  Well controlled with goal < 140/90. No changes to current treatment plan  Moderate episode of recurrent major depressive disorder (H)  Well controlled. No changes to medication  BP Readings from Last 3 Encounters:   03/15/23 126/84   08/25/22 128/80   04/11/22 138/78

## 2023-04-11 DIAGNOSIS — I10 ESSENTIAL HYPERTENSION WITH GOAL BLOOD PRESSURE LESS THAN 130/80: ICD-10-CM

## 2023-04-11 NOTE — TELEPHONE ENCOUNTER
Potassium  LOV 3/15/23  Essential hypertension with goal blood pressure less than 130/80  Well controlled with goal < 140/90. No changes to current treatment plan  BP Readings from Last 3 Encounters:   03/15/23 126/84   08/25/22 128/80   04/11/22 138/78

## 2023-04-12 RX ORDER — POTASSIUM CHLORIDE 750 MG/1
20 CAPSULE, EXTENDED RELEASE ORAL DAILY
Qty: 180 CAPSULE | Refills: 1 | Status: SHIPPED | OUTPATIENT
Start: 2023-04-12 | End: 2023-10-12

## 2023-04-13 DIAGNOSIS — E78.5 HYPERLIPIDEMIA LDL GOAL <100: ICD-10-CM

## 2023-04-13 RX ORDER — SIMVASTATIN 10 MG
TABLET ORAL
Qty: 90 TABLET | Refills: 1 | Status: SHIPPED | OUTPATIENT
Start: 2023-04-13 | End: 2023-10-12

## 2023-04-13 NOTE — TELEPHONE ENCOUNTER
Simvastatin  LOV 3/15/23  BP Readings from Last 3 Encounters:   03/15/23 126/84   08/25/22 128/80   04/11/22 138/78

## 2023-05-11 DIAGNOSIS — N95.1 MENOPAUSAL SYNDROME (HOT FLASHES): ICD-10-CM

## 2023-05-11 RX ORDER — ESTRADIOL 0.5 MG/1
0.5 TABLET ORAL DAILY
Qty: 90 TABLET | Refills: 2 | Status: SHIPPED | OUTPATIENT
Start: 2023-05-11 | End: 2024-03-28

## 2023-05-11 NOTE — TELEPHONE ENCOUNTER
Estrace--last seen 3/15/23, patient is aware will need to establish with new provider.  Will schedule for diabetic 6 yaneth follow up in September (will call ahead to schedule)

## 2023-06-21 DIAGNOSIS — Z90.710 HISTORY OF HYSTERECTOMY: ICD-10-CM

## 2023-06-21 DIAGNOSIS — N95.2 ATROPHIC VAGINITIS: ICD-10-CM

## 2023-07-26 ENCOUNTER — OFFICE VISIT (OUTPATIENT)
Dept: FAMILY MEDICINE | Facility: CLINIC | Age: 63
End: 2023-07-26

## 2023-07-26 VITALS
DIASTOLIC BLOOD PRESSURE: 75 MMHG | OXYGEN SATURATION: 98 % | SYSTOLIC BLOOD PRESSURE: 128 MMHG | HEART RATE: 74 BPM | WEIGHT: 162.2 LBS | BODY MASS INDEX: 25.79 KG/M2

## 2023-07-26 DIAGNOSIS — Z12.12 SCREENING FOR COLORECTAL CANCER: ICD-10-CM

## 2023-07-26 DIAGNOSIS — Z12.11 SCREENING FOR COLORECTAL CANCER: ICD-10-CM

## 2023-07-26 DIAGNOSIS — H69.92 DYSFUNCTION OF LEFT EUSTACHIAN TUBE: ICD-10-CM

## 2023-07-26 DIAGNOSIS — T70.0XXS: Primary | ICD-10-CM

## 2023-07-26 DIAGNOSIS — G25.81 RESTLESS LEG SYNDROME: ICD-10-CM

## 2023-07-26 PROCEDURE — 99213 OFFICE O/P EST LOW 20 MIN: CPT | Performed by: FAMILY MEDICINE

## 2023-07-26 RX ORDER — CLONAZEPAM 1 MG/1
1 TABLET ORAL
Qty: 30 TABLET | Refills: 0 | Status: SHIPPED | OUTPATIENT
Start: 2023-07-26 | End: 2024-05-06

## 2023-07-26 NOTE — PROGRESS NOTES
SUBJECTIVE:    Radha Bobo, is a 62 year old female presenting for the below:     2 week h/o left sided ear pain since desending on a plane with coryzal symptoms at that time with associated pain on descent. Denies nay fever or chills, facial pressure or pain. No otorrhea or decreased hearing.    2. RSL. Klonopin 1 mg at bedtime prn. Uses x 2 per month. Some improvement in RLS  with introduction of weighted blanket. #30 tabs last 12 months.       OBJECTIVE:  Vitals:    07/26/23 1003   BP: 128/75   Pulse: 74   SpO2: 98%   Weight: 73.6 kg (162 lb 3.2 oz)    Body mass index is 25.79 kg/m .  General: no acute distress, cooperative with exam.  Ears: Right TM  no erythema, discharge, or effusion. Left TM with injection superiorly. Otherwise no bulging or air fluid level.     ASSESSMENT / PLAN:      Barotrauma, otitic, sequela  Dysfunction of left eustachian tube  Discussed use of decongestants, steroid nasal spray and steam inhalation.   Reassured will continue to improve to full resolution over next few days to weeks.     Restless leg syndrome  Some improvement with use of weighted blanket.   -     clonazePAM (KLONOPIN) 1 MG tablet; Take 1 tablet (1 mg) by mouth nightly as needed for anxiety or sleep (restless legs. to last at least 12 months.)    Screening for colorectal cancer  -     Colonoscopy Screening  Referral; Future

## 2023-09-05 ENCOUNTER — TRANSFERRED RECORDS (OUTPATIENT)
Dept: FAMILY MEDICINE | Facility: CLINIC | Age: 63
End: 2023-09-05

## 2023-09-11 DIAGNOSIS — F33.1 MODERATE EPISODE OF RECURRENT MAJOR DEPRESSIVE DISORDER (H): ICD-10-CM

## 2023-09-12 NOTE — CONFIDENTIAL NOTE
Med: SERTRALINE    LOV (related): 3/15/23      Due for F/U around: N/A    Next Appt: NONE            10/20/2021    11:00 AM 4/11/2022     1:00 PM 3/15/2023    10:14 AM   PHQ   PHQ-9 Total Score 0 1 0   Q9: Thoughts of better off dead/self-harm past 2 weeks Not at all Not at all Not at all           10/20/2021    11:00 AM 4/11/2022     1:45 PM 3/15/2023    10:14 AM   BLANCO-7 SCORE   Total Score 3 1 2

## 2023-09-14 RX ORDER — SERTRALINE HYDROCHLORIDE 100 MG/1
100 TABLET, FILM COATED ORAL DAILY
Qty: 90 TABLET | Refills: 1 | Status: SHIPPED | OUTPATIENT
Start: 2023-09-14 | End: 2023-09-15

## 2023-09-15 ENCOUNTER — VIRTUAL VISIT (OUTPATIENT)
Dept: FAMILY MEDICINE | Facility: CLINIC | Age: 63
End: 2023-09-15

## 2023-09-15 DIAGNOSIS — F41.1 GAD (GENERALIZED ANXIETY DISORDER): Primary | ICD-10-CM

## 2023-09-15 DIAGNOSIS — F33.1 MODERATE EPISODE OF RECURRENT MAJOR DEPRESSIVE DISORDER (H): ICD-10-CM

## 2023-09-15 PROCEDURE — 99213 OFFICE O/P EST LOW 20 MIN: CPT | Mod: 95 | Performed by: FAMILY MEDICINE

## 2023-09-15 RX ORDER — SERTRALINE HYDROCHLORIDE 100 MG/1
100 TABLET, FILM COATED ORAL DAILY
Qty: 90 TABLET | Refills: 3 | Status: SHIPPED | OUTPATIENT
Start: 2023-09-15 | End: 2024-03-18

## 2023-09-15 ASSESSMENT — ANXIETY QUESTIONNAIRES
1. FEELING NERVOUS, ANXIOUS, OR ON EDGE: NOT AT ALL
IF YOU CHECKED OFF ANY PROBLEMS ON THIS QUESTIONNAIRE, HOW DIFFICULT HAVE THESE PROBLEMS MADE IT FOR YOU TO DO YOUR WORK, TAKE CARE OF THINGS AT HOME, OR GET ALONG WITH OTHER PEOPLE: NOT DIFFICULT AT ALL
5. BEING SO RESTLESS THAT IT IS HARD TO SIT STILL: NEARLY EVERY DAY
3. WORRYING TOO MUCH ABOUT DIFFERENT THINGS: NOT AT ALL
GAD7 TOTAL SCORE: 3
6. BECOMING EASILY ANNOYED OR IRRITABLE: NOT AT ALL
7. FEELING AFRAID AS IF SOMETHING AWFUL MIGHT HAPPEN: NOT AT ALL
2. NOT BEING ABLE TO STOP OR CONTROL WORRYING: NOT AT ALL
GAD7 TOTAL SCORE: 3

## 2023-09-15 ASSESSMENT — PATIENT HEALTH QUESTIONNAIRE - PHQ9
SUM OF ALL RESPONSES TO PHQ QUESTIONS 1-9: 0
5. POOR APPETITE OR OVEREATING: NOT AT ALL

## 2023-09-15 ASSESSMENT — ASTHMA QUESTIONNAIRES: ACT_TOTALSCORE: 25

## 2023-09-15 NOTE — PROGRESS NOTES
Length of phone call: 4 minutes    Patient location:  Home    Provider location:  [facility]     Mode of Communication:  Telephone    This was a telephone visit conducted during COVID-19 outbreak in regulation with social distancing and quarantine recommendations of the CDC and [place] department of health and human services.     SUBJECTIVE:                                                 Radha Bobo is a 62 year old female seen via telephone visit for the following health issues :    BLANCO/ MDD : has been on Zoloft for many years. Occasionally has titrated up to 150 mg during times of situational stress. Currently doing well on 100 mg.     OBJECTIVE:                                                    No vitals taken due to telehealth visit         4/11/2022     1:45 PM 3/15/2023    10:14 AM 9/15/2023     4:22 PM   BLANCO-7 SCORE   Total Score 1 2 3           4/11/2022     1:00 PM 3/15/2023    10:14 AM 9/15/2023     4:22 PM   PHQ   PHQ-9 Total Score 1 0 0   Q9: Thoughts of better off dead/self-harm past 2 weeks Not at all Not at all Not at all          ASSESSMENT/PLAN:                                                        BLANCO (generalized anxiety disorder)  Moderate episode of recurrent major depressive disorder (H)  Stable.   Issued with refills for the next year.   -     sertraline (ZOLOFT) 100 MG tablet; Take 1 tablet (100 mg) by mouth daily

## 2023-10-12 DIAGNOSIS — I10 ESSENTIAL HYPERTENSION WITH GOAL BLOOD PRESSURE LESS THAN 130/80: ICD-10-CM

## 2023-10-12 DIAGNOSIS — E78.5 HYPERLIPIDEMIA LDL GOAL <100: ICD-10-CM

## 2023-10-12 DIAGNOSIS — F33.1 MODERATE EPISODE OF RECURRENT MAJOR DEPRESSIVE DISORDER (H): ICD-10-CM

## 2023-10-13 RX ORDER — BUPROPION HYDROCHLORIDE 300 MG/1
TABLET ORAL
Qty: 90 TABLET | Refills: 1 | Status: SHIPPED | OUTPATIENT
Start: 2023-10-13 | End: 2024-04-16

## 2023-10-13 RX ORDER — SIMVASTATIN 10 MG
TABLET ORAL
Qty: 90 TABLET | Refills: 1 | Status: SHIPPED | OUTPATIENT
Start: 2023-10-13 | End: 2024-09-16

## 2023-10-13 RX ORDER — POTASSIUM CHLORIDE 750 MG/1
20 CAPSULE, EXTENDED RELEASE ORAL DAILY
Qty: 180 CAPSULE | Refills: 1 | Status: SHIPPED | OUTPATIENT
Start: 2023-10-13 | End: 2024-07-19

## 2023-10-13 NOTE — CONFIDENTIAL NOTE
Med: SIMVASTATIN, WELLBUTRIN, POTASSIUM    LOV (related): 9/15/23 - BLANCO/MDD  LOV (RELATED): 3/15/23 - MED CHECK    LAST LAB: LIPID 8/25/22      Due for F/U around: N/A    Next Appt: NONE

## 2023-10-16 ENCOUNTER — OFFICE VISIT (OUTPATIENT)
Dept: FAMILY MEDICINE | Facility: CLINIC | Age: 63
End: 2023-10-16

## 2023-10-16 VITALS
WEIGHT: 164.8 LBS | SYSTOLIC BLOOD PRESSURE: 143 MMHG | HEART RATE: 101 BPM | OXYGEN SATURATION: 100 % | DIASTOLIC BLOOD PRESSURE: 76 MMHG | BODY MASS INDEX: 26.2 KG/M2

## 2023-10-16 DIAGNOSIS — Z23 NEEDS FLU SHOT: ICD-10-CM

## 2023-10-16 DIAGNOSIS — R22.42 LOCALIZED SWELLING OF LEFT FOOT: Primary | ICD-10-CM

## 2023-10-16 LAB
ALBUMIN SERPL BCG-MCNC: 4.6 G/DL (ref 3.5–5.2)
ALP SERPL-CCNC: 82 U/L (ref 35–104)
ALT SERPL W P-5'-P-CCNC: 34 U/L (ref 0–50)
ANION GAP SERPL CALCULATED.3IONS-SCNC: 13 MMOL/L (ref 7–15)
AST SERPL W P-5'-P-CCNC: 30 U/L (ref 0–45)
BILIRUB SERPL-MCNC: 0.2 MG/DL
BUN SERPL-MCNC: 20 MG/DL (ref 8–23)
CALCIUM SERPL-MCNC: 10.2 MG/DL (ref 8.8–10.2)
CHLORIDE SERPL-SCNC: 99 MMOL/L (ref 98–107)
CREAT SERPL-MCNC: 0.89 MG/DL (ref 0.51–0.95)
DEPRECATED HCO3 PLAS-SCNC: 24 MMOL/L (ref 22–29)
EGFRCR SERPLBLD CKD-EPI 2021: 72 ML/MIN/1.73M2
GLUCOSE SERPL-MCNC: 108 MG/DL (ref 70–99)
POTASSIUM SERPL-SCNC: 4.8 MMOL/L (ref 3.4–5.3)
PROT SERPL-MCNC: 7.5 G/DL (ref 6.4–8.3)
SODIUM SERPL-SCNC: 136 MMOL/L (ref 135–145)
URATE SERPL-MCNC: 4.2 MG/DL (ref 2.4–5.7)

## 2023-10-16 PROCEDURE — 99213 OFFICE O/P EST LOW 20 MIN: CPT | Mod: 25

## 2023-10-16 PROCEDURE — 84550 ASSAY OF BLOOD/URIC ACID: CPT

## 2023-10-16 PROCEDURE — 36415 COLL VENOUS BLD VENIPUNCTURE: CPT

## 2023-10-16 PROCEDURE — 80053 COMPREHEN METABOLIC PANEL: CPT

## 2023-10-16 PROCEDURE — 90471 IMMUNIZATION ADMIN: CPT | Mod: 59

## 2023-10-16 PROCEDURE — 90674 CCIIV4 VAC NO PRSV 0.5 ML IM: CPT

## 2023-10-16 NOTE — PATIENT INSTRUCTIONS
What to do when your medication needs to be refilled?     Call your pharmacy directly and request the medication refill.     Notes about prescriptions-  -Every time our providers send a prescription to the pharmacy, a new prescription number is created.     -If it is too soon for the medication(s) to be filled due to insurance, the pharmacy will STORE them on your profile and can be requested to fill at a later date    -If you have automatic refills, you may need to ask the STORED prescription to be filled when the refills run out    -Calling the pharmacy and speaking to the staff is the first step if there are issues using the automated systems    -You can check your current prescription orders and request refills using Spark[employer]    -Most prescriptions  1 year from the date written (some medication orders  sooner like controlled substances)

## 2023-10-16 NOTE — PROGRESS NOTES
"  Assessment & Plan     Localized swelling of left foot  -unsure etiology, could be arthritic given worse stationary but no pain reciprocated in clinic today, will do lab work and progress with plan of care based on results  - Uric acid  - Comprehensive metabolic panel  - VENOUS COLLECTION  - Uric acid  - Comprehensive metabolic panel    Needs flu shot  - VACCINE ADMINISTRATION, INITIAL               BMI:   Estimated body mass index is 26.2 kg/m  as calculated from the following:    Height as of 3/15/23: 1.689 m (5' 6.5\").    Weight as of this encounter: 74.8 kg (164 lb 12.8 oz).       See Patient Instructions    Return in about 2 weeks (around 10/30/2023).    LUPE Pinedo [facility]    Tulio Perez is a 62 year old, presenting for the following health issues:  Edema (Left foot swelling (not so bad today) noticed it the past few weeks and notices when she is in bed and feels like naturopathy , having some back pain comes and goes )    HPI     New left foot swelling - harder to get shoe on for few weeks, worse swelling at night with tingling - feels better throughout the day. Swelling does not improve throughout the day. Patient has been carrying her vacuum up and down 3 story home cleaning recently         No swelling to foot today in clinic   No new shortness of breath or chest pain.   Right lower back pain - feels 'tight' feels better with stretching       Concern - left foot swelling  Onset: 'few weeks'  Description: difficult getting shoe on, swelling with tingling at night, tingling not present during day, swelling can persist - waxes and wanes  Intensity: mild  Progression of Symptoms:  waxing and waning  Accompanying Signs & Symptoms: also having bilateral thoracic pain   Previous history of similar problem: no  Precipitating factors:        Worsened by: sitting or lying flat  Alleviating factors:        Improved by: activity   Therapies tried and outcome:  none         Review of " Systems   Constitutional, HEENT, cardiovascular, pulmonary, gi and gu systems are negative, except as otherwise noted.      Objective    BP (!) 143/76   Pulse 101   Wt 74.8 kg (164 lb 12.8 oz)   SpO2 100%   BMI 26.20 kg/m    Body mass index is 26.2 kg/m .  Physical Exam   GENERAL: healthy, alert and no distress  NECK: no adenopathy, no asymmetry, masses, or scars and thyroid normal to palpation  RESP: lungs clear to auscultation - no rales, rhonchi or wheezes  CV: regular rate and rhythm, normal S1 S2, no S3 or S4, no murmur, click or rub, no peripheral edema and peripheral pulses strong  ABDOMEN: soft, nontender, no hepatosplenomegaly, no masses and bowel sounds normal  MS: no gross musculoskeletal defects noted, no edema  SKIN: no suspicious lesions or rashes    No results found for this or any previous visit (from the past 24 hour(s)).

## 2023-12-11 ENCOUNTER — OFFICE VISIT (OUTPATIENT)
Dept: FAMILY MEDICINE | Facility: CLINIC | Age: 63
End: 2023-12-11

## 2023-12-11 VITALS
WEIGHT: 165 LBS | SYSTOLIC BLOOD PRESSURE: 126 MMHG | OXYGEN SATURATION: 98 % | BODY MASS INDEX: 26.23 KG/M2 | HEART RATE: 78 BPM | DIASTOLIC BLOOD PRESSURE: 66 MMHG

## 2023-12-11 DIAGNOSIS — R05.1 ACUTE COUGH: ICD-10-CM

## 2023-12-11 DIAGNOSIS — J45.20 INTERMITTENT ASTHMA, UNSPECIFIED ASTHMA SEVERITY, UNSPECIFIED WHETHER COMPLICATED: Primary | ICD-10-CM

## 2023-12-11 DIAGNOSIS — J45.21 MILD INTERMITTENT ASTHMA WITH EXACERBATION: ICD-10-CM

## 2023-12-11 LAB
% GRANULOCYTES: 69.5 % (ref 42.2–75.2)
HCT VFR BLD AUTO: 41.1 % (ref 35–46)
HEMOGLOBIN: 14 G/DL (ref 11.8–15.5)
INFLUENZA A (RMG): NEGATIVE
INFLUENZA B (RMG): NEGATIVE
LYMPHOCYTES NFR BLD AUTO: 24.3 % (ref 20.5–51.1)
MCH RBC QN AUTO: 29.3 PG (ref 27–31)
MCHC RBC AUTO-ENTMCNC: 34.1 G/DL (ref 33–37)
MCV RBC AUTO: 86 FL (ref 80–100)
MONOCYTES NFR BLD AUTO: 6.2 % (ref 1.7–9.3)
PLATELET # BLD AUTO: 354 K/UL (ref 140–450)
RBC # BLD AUTO: 4.78 X10/CMM (ref 3.7–5.2)
SARS ANTIGEN (RMG): NEGATIVE
WBC # BLD AUTO: 8.9 X10/CMM (ref 3.8–11)

## 2023-12-11 PROCEDURE — 99213 OFFICE O/P EST LOW 20 MIN: CPT | Performed by: FAMILY MEDICINE

## 2023-12-11 PROCEDURE — 36415 COLL VENOUS BLD VENIPUNCTURE: CPT | Performed by: FAMILY MEDICINE

## 2023-12-11 PROCEDURE — 71046 X-RAY EXAM CHEST 2 VIEWS: CPT | Performed by: FAMILY MEDICINE

## 2023-12-11 PROCEDURE — 87428 SARSCOV & INF VIR A&B AG IA: CPT | Mod: QW | Performed by: FAMILY MEDICINE

## 2023-12-11 PROCEDURE — 85025 COMPLETE CBC W/AUTO DIFF WBC: CPT | Performed by: FAMILY MEDICINE

## 2023-12-11 RX ORDER — ALBUTEROL SULFATE 90 UG/1
2 AEROSOL, METERED RESPIRATORY (INHALATION) 4 TIMES DAILY
Qty: 54 G | Refills: 1 | Status: SHIPPED | OUTPATIENT
Start: 2023-12-11 | End: 2024-05-06

## 2023-12-11 RX ORDER — FLUTICASONE PROPIONATE 110 UG/1
1 AEROSOL, METERED RESPIRATORY (INHALATION) 2 TIMES DAILY
Qty: 12 G | Refills: 1 | Status: SHIPPED | OUTPATIENT
Start: 2023-12-11

## 2023-12-11 RX ORDER — ALBUTEROL SULFATE 0.83 MG/ML
2.5 SOLUTION RESPIRATORY (INHALATION) EVERY 6 HOURS PRN
Qty: 90 ML | Refills: 0 | Status: SHIPPED | OUTPATIENT
Start: 2023-12-11 | End: 2024-05-06

## 2023-12-11 NOTE — PROGRESS NOTES
SUBJECTIVE:    Radha Bobo, is a 63 year old female with asthma presenting for the below:     1. 7-10 day h/o cough. Initially productive, but now more dry. Feeling congested. Worse at night, with associated shortness of breath and wheeze.  Feeling like no improvement each day. Some transient resolution with GERI inhaler. No fevers or chills. Some improvement with robitussin with codeine and musinex.      3 x home COVID 19 test negative.       OBJECTIVE:  Vitals:    12/11/23 1205   BP: 126/66   Pulse: 78   SpO2: 98%   Weight: 74.8 kg (165 lb)    Body mass index is 26.23 kg/m .  General: no acute distress, cooperative with exam.  Eyes: no injection or drainage.  Ears: TM's and canals show no erythema, discharge, or effusion bilaterally.  Throat: moist mucous membranes, no tonsillar exudate or hypertrophy, posterior oral pharynx non-erythematous without lesions.  Neck: supple, no thyroid nodules or enlargement.  Lungs: clear to auscultation bilaterally, normal respiratory effort.  Heart: regular rate, normal S1 and S2, no murmurs.   Abdomen: normal bowel sounds, nontender, no palpable organomegaly.  Extremities: warm, perfused, no swelling or edema.  Neuro: grossly intact   Psych: mental status normal, mood and affect appropriate.    CXR: Personally reviewed images. Two views of the chest demonstrate a normal cardiac mediastinal silhouette. The pulmonary vascularity is within normal limits. Lungs are clear of acute airspace or interstitial process. No effusion seen. Bones and soft tissues appear unremarkable.     SARS antigen : negative  Influenza A and B : negative      ASSESSMENT / PLAN:      Mild intermittent asthma with exacerbation  Acute cough   No evidence of consolidation on CXR. Most in keeping with viral URI causing asthma exacerbation with increased cough and mucus production. Add in ICS for next week or so. Can switch to GERI nebs also.   -     CBC with Diff/Plt ([facility])  -     Influenza + SARS Antigen  ([facility])  -     X-ray Chest 2 vws*  -     VENOUS COLLECTION  -     albuterol (PROAIR HFA/PROVENTIL HFA/VENTOLIN HFA) 108 (90 Base) MCG/ACT inhaler; Inhale 2 puffs into the lungs 4 times daily  -     VENOUS COLLECTION  -     fluticasone (FLOVENT HFA) 110 MCG/ACT inhaler; Inhale 1 puff into the lungs 2 times daily  -     albuterol (PROVENTIL) (2.5 MG/3ML) 0.083% neb solution; Take 1 vial (2.5 mg) by nebulization every 6 hours as needed for shortness of breath, wheezing or cough

## 2024-01-22 ENCOUNTER — OFFICE VISIT (OUTPATIENT)
Dept: FAMILY MEDICINE | Facility: CLINIC | Age: 64
End: 2024-01-22

## 2024-01-22 VITALS
SYSTOLIC BLOOD PRESSURE: 137 MMHG | BODY MASS INDEX: 26.99 KG/M2 | OXYGEN SATURATION: 99 % | HEART RATE: 79 BPM | WEIGHT: 162 LBS | DIASTOLIC BLOOD PRESSURE: 77 MMHG | HEIGHT: 65 IN

## 2024-01-22 DIAGNOSIS — R10.9 ABDOMINAL CRAMPING: Primary | ICD-10-CM

## 2024-01-22 PROCEDURE — 99213 OFFICE O/P EST LOW 20 MIN: CPT | Performed by: FAMILY MEDICINE

## 2024-01-22 PROCEDURE — 87329 GIARDIA AG IA: CPT | Performed by: FAMILY MEDICINE

## 2024-01-22 PROCEDURE — 87328 CRYPTOSPORIDIUM AG IA: CPT | Performed by: FAMILY MEDICINE

## 2024-01-22 NOTE — PROGRESS NOTES
"  Tulio Garcia is a 63 year old patient who presents to clinic for evaluation.  Has had mild abdominal cramping lately.  No diarrhea.  Her dog was just diagnosed with giardia on routine screening and was also asymptomatic.  The patient reports she had a severe case of giardia in 2016 and wants to avoid that happening again.  No other concerns.        Review of Systems   Constitutional, HEENT, cardiovascular, pulmonary, GI, , musculoskeletal, neuro, skin, endocrine and psych systems are negative, except as otherwise noted.      Objective    /77   Pulse 79   Ht 1.645 m (5' 4.75\")   Wt 73.5 kg (162 lb)   SpO2 99%   BMI 27.17 kg/m      General: Well appearing, NAD  Psych: normal mood and affect        No results found for this or any previous visit (from the past 24 hour(s)).    Abdominal cramping  Would like to test to ensure not infected.  Follow up pending results.  - Cryptosporidium/Giardia Immunoassay; Future  - Cryptosporidium/Giardia Immunoassay            "

## 2024-01-23 LAB
C PARVUM AG STL QL IA: NEGATIVE
G LAMBLIA AG STL QL IA: NEGATIVE

## 2024-02-14 ENCOUNTER — OFFICE VISIT (OUTPATIENT)
Dept: FAMILY MEDICINE | Facility: CLINIC | Age: 64
End: 2024-02-14

## 2024-02-14 VITALS — HEART RATE: 80 BPM | SYSTOLIC BLOOD PRESSURE: 139 MMHG | OXYGEN SATURATION: 97 % | DIASTOLIC BLOOD PRESSURE: 75 MMHG

## 2024-02-14 DIAGNOSIS — K21.9 GASTROESOPHAGEAL REFLUX DISEASE WITHOUT ESOPHAGITIS: Primary | ICD-10-CM

## 2024-02-14 PROCEDURE — 99213 OFFICE O/P EST LOW 20 MIN: CPT

## 2024-02-14 ASSESSMENT — ASTHMA QUESTIONNAIRES: ACT_TOTALSCORE: 25

## 2024-02-14 ASSESSMENT — ENCOUNTER SYMPTOMS: ABDOMINAL PAIN: 1

## 2024-02-14 NOTE — LETTER
[facility]  02/14/24  Patient: Radha Bobo  YOB: 1960  Medical Record Number: [id number]                                                                                  Non-Opioid Controlled Substance Agreement    This is an agreement between you and your provider regarding safe and appropriate use of controlled substances prescribed by your care team. Controlled substances are?medicines that can cause physical and mental dependence (abuse).     There are strict laws about having and using these medicines. We here at [facility] are  committed to working with you in your efforts to get better. To support you in this work, we'll help you schedule regular office appointments for medicine refills. If we must cancel or change your appointment for any reason, we'll make sure you have enough medicine to last until your next appointment.     As a Provider, I will:   Listen carefully to your concerns while treating you with respect.   Recommend a treatment plan that I believe is in your best interest and may involve therapies other than medicine.    Talk with you often about the possible benefits and the risk of harm of any medicine that we prescribe for you.  Assess the safety of this medicine and check how well it works.    Provide a plan on how to taper (discontinue or go off) using this medicine if the decision is made to stop its use.      ::  As a Patient, I understand controlled substances:     Are prescribed by my care provider to help me function or work and manage my condition(s).?  Are strong medicines and can cause serious side effects.     Need to be taken exactly as prescribed.?Combining controlled substances with certain medicines or chemicals (such as illegal drugs, alcohol, sedatives, sleeping pills, and benzodiazepines) can be dangerous or even fatal.? If I stop taking my medicines suddenly, I may have severe withdrawal symptoms.     The risks, benefits, and side  effects of these medicine(s) were explained to me. I agree that:    I will take part in other treatments as advised by my care team. This may be psychiatry or counseling, physical therapy, behavioral therapy, group treatment or a referral to specialist.    I will keep all my appointments and understand this is part of the monitoring of controlled substances.?My care team may require an office visit for EVERY controlled substance refill. If I miss appointments or don t follow instructions, my care team may stop my medicine    I will take my medicines as prescribed. I will not change the dose or schedule unless my care team tells me to. There will be no refills if I run out early.      I may be asked to come to the clinic and complete a urine drug test or complete a pill count. If I don t give a urine sample or participate in a pill count, the care team may stop my medicine.    I will only receive controlled substance prescriptions from this clinic. If I am treated by another provider, I will tell them that I am taking controlled substances and that I have a treatment agreement with this provider. I will inform my [facility] care team within one business day if I am given a prescription for any controlled substance by another healthcare provider. My [facility] care team can contact other providers and pharmacists about my use of any medicines.    It is up to me to make sure that I don't run out of my medicines on weekends or holidays.?If my care team is willing to refill my prescription without a visit, I must request refills only during office hours. Refills may take up to 3 business days to process. I will use one pharmacy to fill all my controlled substance prescriptions. I will notify the clinic about any changes to my insurance or medicine availability.    I am responsible for my prescriptions. If the medicine/prescription is lost, stolen or destroyed, it will not be replaced.?I also agree not to  share controlled substance medicines with anyone.     I am aware I should not use any illegal or recreational drugs. I agree not to drink alcohol unless my care team says I can.     If I enroll in the [place] Medical Cannabis program, I will tell my care team before my next refill.    I will tell my care team right away if I become pregnant, have a new medical problem treated outside of my regular clinic, or have a change in my medicines.     I understand that this medicine can affect my thinking, judgment and reaction time.? Alcohol and drugs affect the brain and body, which can affect the safety of my driving. Being under the influence of alcohol or drugs can affect my decision-making, behaviors, personal safety and the safety of others. Driving while impaired (DWI) can occur if a person is driving, operating or in physical control of a car, motorcycle, boat, snowmobile, ATV, motorbike, off-road vehicle or any other motor vehicle ([place] Statute 169[place].20). I understand the risk if I choose to drive or operate any vehicle or machinery.    I understand that if I do not follow any of the conditions above, my prescriptions or treatment may be stopped or changed.   I agree that my provider, clinic care team and pharmacy may work with any city, state or federal law enforcement agency that investigates the misuse, sale or other diversion of my controlled medicine. I will allow my provider to discuss my care with, or share a copy of, this agreement with any other treating provider, pharmacy or emergency room where I receive care.     I have read this agreement and have asked questions about anything I did not understand.    ________________________________________________________  Patient Signature - Radha Bobo     ___________________                   Date     ________________________________________________________  Provider Signature - LUPE Pinedo CNP       ___________________                   Date      ________________________________________________________  Witness Signature (required if provider not present while patient signing)          ___________________                   Date

## 2024-02-14 NOTE — PROGRESS NOTES
"  Assessment & Plan     Gastroesophageal reflux disease without esophagitis  -given worse with lying flat suspect could be GERD, will trial omeprazole for 2-4 weeks and follow up if not having any improvement  -lifestyle and nonpharmacological interventions discussed   -Red flags that warrant emergent evaluation discussed  -Patient verbalized understanding and is agreeable to the discussed plan of care.  -Education about adverse effects of prescription medication discussed  - if not improving can consider lab work up or imaging depending on severity or GI referral   - omeprazole (PRILOSEC) 20 MG DR capsule  Dispense: 90 capsule; Refill: 0      Prescription drug management        BMI  Estimated body mass index is 27.17 kg/m  as calculated from the following:    Height as of 1/22/24: 1.645 m (5' 4.75\").    Weight as of 1/22/24: 73.5 kg (162 lb).         See Patient Instructions    No follow-ups on file.    Tulio Perez is a 63 year old, presenting for the following health issues:  Flank Pain (L side - for a while now, ongoing. Not super bad but irritating/R side - seen for already, but still ongoing/Unsure if from lifting things, carrying vacuum up stairs.. also knows not drinking water as much as she should be) and Recheck Medication (CSA due for Clonazepam)        Musculoskeletal Problem  This is a new problem. The current episode started 1 to 4 weeks ago. The problem occurs intermittently. Associated symptoms include abdominal pain.      Worse with lying flat. Patient very active during the day with heavy lifting but pain not worse with this. She reports mother has history of hiatal hernia. Denies any abnormal weight loss nausea vomiting or bowel habit changes.              Review of Systems  Constitutional, HEENT, cardiovascular, pulmonary, gi and gu systems are negative, except as otherwise noted.      Objective    /75   Pulse 80   SpO2 97%   There is no height or weight on file to calculate " BMI.  Physical Exam   GENERAL: alert and no distress  NECK: no adenopathy, no asymmetry, masses, or scars  RESP: lungs clear to auscultation - no rales, rhonchi or wheezes  CV: regular rate and rhythm, normal S1 S2, no S3 or S4, no murmur, click or rub, no peripheral edema  ABDOMEN: mildly tender to LUQ otherwise soft, nontender, no hepatosplenomegaly, no masses and bowel sounds normal  MS: no gross musculoskeletal defects noted, no edema            Signed Electronically by: LUPE Pinedo CNP

## 2024-02-14 NOTE — LETTER
My Asthma Action Plan    Name: Radha Bobo   YOB: 1960  Date: 2/14/2024   My doctor: LUPE Pinedo CNP   My clinic: [facility]        My Rescue Medicine:   Albuterol inhaler (Proair/Ventolin/Proventil HFA)  2-4 puffs EVERY 4 HOURS as needed. Use a spacer if recommended by your provider.   My Asthma Severity:   Intermittent / Exercise Induced  Know your asthma triggers: Patient is unaware of triggers             GREEN ZONE   Good Control  I feel good  No cough or wheeze  Can work, sleep and play without asthma symptoms       Take your asthma control medicine every day.     If exercise triggers your asthma, take your rescue medication  15 minutes before exercise or sports, and  During exercise if you have asthma symptoms  Spacer to use with inhaler: If you have a spacer, make sure to use it with your inhaler             YELLOW ZONE Getting Worse  I have ANY of these:  I do not feel good  Cough or wheeze  Chest feels tight  Wake up at night   Keep taking your Green Zone medications  Start taking your rescue medicine:  every 20 minutes for up to 1 hour. Then every 4 hours for 24-48 hours.  If you stay in the Yellow Zone for more than 12-24 hours, contact your doctor.  If you do not return to the Green Zone in 12-24 hours or you get worse, start taking your oral steroid medicine if prescribed by your provider.           RED ZONE Medical Alert - Get Help  I have ANY of these:  I feel awful  Medicine is not helping  Breathing getting harder  Trouble walking or talking  Nose opens wide to breathe       Take your rescue medicine NOW  If your provider has prescribed an oral steroid medicine, start taking it NOW  Call your doctor NOW  If you are still in the Red Zone after 20 minutes and you have not reached your doctor:  Take your rescue medicine again and  Call 911 or go to the emergency room right away    See your regular doctor within 2 weeks of an Emergency Room or Urgent Care visit  for follow-up treatment.          Annual Reminders:  Meet with Asthma Educator,  Flu Shot in the Fall, consider Pneumonia Vaccination for patients with asthma (aged 19 and older).    Pharmacy:    [facility] - FAX ONLY  [id number] - [place], [place] - [address] AT [facility] [address]    Electronically signed by LUPE Pinedo CNP   Date: 02/14/24                    Asthma Triggers  How To Control Things That Make Your Asthma Worse    Triggers are things that make your asthma worse.  Look at the list below to help you find your triggers and   what you can do about them. You can help prevent asthma flare-ups by staying away from your triggers.      Trigger                                                          What you can do   Cigarette Smoke  Tobacco smoke can make asthma worse. Do not allow smoking in your home, car or around you.  Be sure no one smokes at a child s day care or school.  If you smoke, ask your health care provider for ways to help you quit.  Ask family members to quit too.  Ask your health care provider for a referral to Quit Plan to help you quit smoking, or call 8-208-856-PLAN.     Colds, Flu, Bronchitis  These are common triggers of asthma. Wash your hands often.  Don t touch your eyes, nose or mouth.  Get a flu shot every year.     Dust Mites  These are tiny bugs that live in cloth or carpet. They are too small to see. Wash sheets and blankets in hot water every week.   Encase pillows and mattress in dust mite proof covers.  Avoid having carpet if you can. If you have carpet, vacuum weekly.   Use a dust mask and HEPA vacuum.   Pollen and Outdoor Mold  Some people are allergic to trees, grass, or weed pollen, or molds. Try to keep your windows closed.  Limit time out doors when pollen count is high.   Ask you health care provider about taking medicine during allergy season.     Animal Dander  Some people are allergic to skin flakes, urine or saliva from  pets with fur or feathers. Keep pets with fur or feathers out of your home.    If you can t keep the pet outdoors, then keep the pet out of your bedroom.  Keep the bedroom door closed.  Keep pets off cloth furniture and away from stuffed toys.     Mice, Rats, and Cockroaches  Some people are allergic to the waste from these pests.   Cover food and garbage.  Clean up spills and food crumbs.  Store grease in the refrigerator.   Keep food out of the bedroom.   Indoor Mold  This can be a trigger if your home has high moisture. Fix leaking faucets, pipes, or other sources of water.   Clean moldy surfaces.  Dehumidify basement if it is damp and smelly.   Smoke, Strong Odors, and Sprays  These can reduce air quality. Stay away from strong odors and sprays, such as perfume, powder, hair spray, paints, smoke incense, paint, cleaning products, candles and new carpet.   Exercise or Sports  Some people with asthma have this trigger. Be active!  Ask your doctor about taking medicine before sports or exercise to prevent symptoms.    Warm up for 5-10 minutes before and after sports or exercise.     Other Triggers of Asthma  Cold air:  Cover your nose and mouth with a scarf.  Sometimes laughing or crying can be a trigger.  Some medicines and food can trigger asthma.

## 2024-03-15 DIAGNOSIS — F33.1 MODERATE EPISODE OF RECURRENT MAJOR DEPRESSIVE DISORDER (H): ICD-10-CM

## 2024-03-16 NOTE — TELEPHONE ENCOUNTER
Med: sertraline     LOV (related): LOV 2/14/24 with Claire Gallego NP, last addressed 9/15/23 with Dr. Mcdowell       Due for F/U around: due for cpx    Next Appt: none          4/11/2022     1:00 PM 3/15/2023    10:14 AM 9/15/2023     4:22 PM   PHQ   PHQ-9 Total Score 1 0 0   Q9: Thoughts of better off dead/self-harm past 2 weeks Not at all Not at all Not at all           4/11/2022     1:45 PM 3/15/2023    10:14 AM 9/15/2023     4:22 PM   BLANCO-7 SCORE   Total Score 1 2 3

## 2024-03-18 RX ORDER — SERTRALINE HYDROCHLORIDE 100 MG/1
100 TABLET, FILM COATED ORAL DAILY
Qty: 90 TABLET | Refills: 1 | Status: SHIPPED | OUTPATIENT
Start: 2024-03-18 | End: 2024-09-25

## 2024-03-26 DIAGNOSIS — E03.9 ACQUIRED HYPOTHYROIDISM: ICD-10-CM

## 2024-03-26 NOTE — TELEPHONE ENCOUNTER
Med: Levothyroxine 125mcg    LOV (related): 3/15/2023    Last Lab: 03/21/2023      Due for F/U around: Overdue for CPX!    Next Appt: Not scheduled                   Component  Ref Range & Units 1 yr ago  (3/21/23) 2 yr ago  (10/20/21) 3 yr ago  (8/24/20) 4 yr ago  (8/28/19) 5 yr ago  (3/5/19) 5 yr ago  (8/15/18) 6 yr ago  (11/15/17)    TSH  0.450 - 4.500 uIU/mL 3.130 3.030 2.270 1.370 4.990 High  4.150 2.450

## 2024-03-27 ENCOUNTER — ANCILLARY PROCEDURE (OUTPATIENT)
Dept: ULTRASOUND IMAGING | Facility: CLINIC | Age: 64
End: 2024-03-27
Payer: COMMERCIAL

## 2024-03-27 ENCOUNTER — OFFICE VISIT (OUTPATIENT)
Dept: FAMILY MEDICINE | Facility: CLINIC | Age: 64
End: 2024-03-27

## 2024-03-27 ENCOUNTER — MEDICAL CORRESPONDENCE (OUTPATIENT)
Dept: FAMILY MEDICINE | Facility: CLINIC | Age: 64
End: 2024-03-27

## 2024-03-27 VITALS
HEART RATE: 101 BPM | DIASTOLIC BLOOD PRESSURE: 64 MMHG | BODY MASS INDEX: 27.5 KG/M2 | SYSTOLIC BLOOD PRESSURE: 125 MMHG | WEIGHT: 164 LBS | OXYGEN SATURATION: 97 %

## 2024-03-27 DIAGNOSIS — M79.661 TENDERNESS OF RIGHT CALF: ICD-10-CM

## 2024-03-27 DIAGNOSIS — L53.9 ERYTHEMA: ICD-10-CM

## 2024-03-27 DIAGNOSIS — S50.819A CAT SCRATCH OF FOREARM, INITIAL ENCOUNTER: Primary | ICD-10-CM
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
## 2024-03-27 PROCEDURE — 99214 OFFICE O/P EST MOD 30 MIN: CPT

## 2024-03-27 PROCEDURE — 93971 EXTREMITY STUDY: CPT | Mod: RT

## 2024-03-27 RX ORDER — LEVOTHYROXINE SODIUM 125 UG/1
125 TABLET ORAL DAILY
Qty: 90 TABLET | Refills: 0 | Status: SHIPPED | OUTPATIENT
Start: 2024-03-27 | End: 2024-06-24

## 2024-03-27 RX ORDER — AZITHROMYCIN 250 MG/1
TABLET, FILM COATED ORAL
Qty: 6 TABLET | Refills: 0 | Status: SHIPPED | OUTPATIENT
Start: 2024-03-27 | End: 2024-04-01

## 2024-03-27 ASSESSMENT — ANXIETY QUESTIONNAIRES
2. NOT BEING ABLE TO STOP OR CONTROL WORRYING: NOT AT ALL
7. FEELING AFRAID AS IF SOMETHING AWFUL MIGHT HAPPEN: NOT AT ALL
1. FEELING NERVOUS, ANXIOUS, OR ON EDGE: NOT AT ALL
IF YOU CHECKED OFF ANY PROBLEMS ON THIS QUESTIONNAIRE, HOW DIFFICULT HAVE THESE PROBLEMS MADE IT FOR YOU TO DO YOUR WORK, TAKE CARE OF THINGS AT HOME, OR GET ALONG WITH OTHER PEOPLE: NOT DIFFICULT AT ALL
3. WORRYING TOO MUCH ABOUT DIFFERENT THINGS: NOT AT ALL
6. BECOMING EASILY ANNOYED OR IRRITABLE: NOT AT ALL
GAD7 TOTAL SCORE: 0
GAD7 TOTAL SCORE: 0
5. BEING SO RESTLESS THAT IT IS HARD TO SIT STILL: NOT AT ALL

## 2024-03-27 ASSESSMENT — PATIENT HEALTH QUESTIONNAIRE - PHQ9
SUM OF ALL RESPONSES TO PHQ QUESTIONS 1-9: 1
5. POOR APPETITE OR OVEREATING: NOT AT ALL

## 2024-03-27 NOTE — PROGRESS NOTES
"  Assessment & Plan     Cat scratch of forearm, initial encounter  -suspect infection given tenderness and bumps, will treat with azithromycin - patient has tolerated in the past  -Education about adverse effects of prescription medication discussed  -Red flags that warrant emergent evaluation discussed  -Patient verbalized understanding and is agreeable to the discussed plan of care.    - azithromycin (ZITHROMAX) 250 MG tablet  Dispense: 6 tablet; Refill: 0    Tenderness of right calf  -given hormone replacement therapy and recent road trip along with signs and symptoms will obtain utrasound today to address DVT status, patient agrees with plan   -Red flags that warrant emergent evaluation discussed  -Patient verbalized understanding and is agreeable to the discussed plan of care.  -if any abnormal findings, radiology is to call me directly, number provided in order   - US Lower Extremity Venous Duplex Right    Erythema  - US Lower Extremity Venous Duplex Right    BMI  Estimated body mass index is 27.5 kg/m  as calculated from the following:    Height as of 1/22/24: 1.645 m (5' 4.75\").    Weight as of this encounter: 74.4 kg (164 lb).         Tulio Perez is a 63 year old, presenting for the following health issues:  Laceration (Cat scratch on right forearm, scabbed over and very itchy. Has been using neosporin and hydrocortisone cream. Denies discharge, swelling, fever/Tetanus: 10/2021) and Musculoskeletal Problem (Having shooting pains in right leg (calf), sudden onset a few weeks ago. Worse when laying down. Has been icing and elevating. Hx of sciatica pain)    HPI   1.) cat scratch: right forearm - firm and tender to touch - had 2 weeks ago     2.) deep ache and throb to right lower calf - jolting pains - could not sleep last night - started 2 weeks ago after road trip to  new kitten, no history of DVT or clots, patient states it does not hurt as much as it did last night but the pain has " consistently been there for the entire 2 weeks and has not gotten better. Open scratch in the area of pain along with erythema about     Review of Systems  Constitutional, HEENT, cardiovascular, pulmonary, gi and gu systems are negative, except as otherwise noted.      Objective    /64   Pulse 101   Wt 74.4 kg (164 lb)   SpO2 97%   BMI 27.50 kg/m    Body mass index is 27.5 kg/m .  Physical Exam   GENERAL: alert and no distress  EYES: Eyes grossly normal to inspection, PERRL and conjunctivae and sclerae normal  RESP: lungs clear to auscultation - no rales, rhonchi or wheezes  CV: regular rate and rhythm, normal S1 S2, no S3 or S4, no murmur, click or rub, no peripheral edema  MS: no gross musculoskeletal defects noted, no edema  SKIN:   Cat scratch above was rather tender to touch, no streaking Calf erythema measures about 5cm x 3.5cm in diameter, non fluctuant   NEURO: Normal strength and tone, mentation intact and speech normal  LYMPH: no cervical, supraclavicular, axillary, or inguinal adenopathy    No results found for this or any previous visit (from the past 24 hour(s)).        Signed Electronically by: LUPE Pinedo CNP

## 2024-03-28 DIAGNOSIS — N95.1 MENOPAUSAL SYNDROME (HOT FLASHES): ICD-10-CM

## 2024-03-28 RX ORDER — ESTRADIOL 0.5 MG/1
0.5 TABLET ORAL DAILY
Qty: 90 TABLET | Refills: 0 | Status: SHIPPED | OUTPATIENT
Start: 2024-03-28 | End: 2024-07-19

## 2024-03-28 NOTE — TELEPHONE ENCOUNTER
Patient calls requesting refill on estradiol 0.5mg tabs si po QD     Last related visit: 3/15/23 comprehensive med check/preventative visit    Last mammogram: 3/7/23 negative. Recommended repeat in 1 year.    Per chart review, patient has been on oral estrogen since had hysterectomy at age 29 and added the Premarin in her mid 40's for the vaginal symptoms. This regimen has worked well for her.     Plan: routed to Claire Gallego NP for review.  Davida Jaramillo RN

## 2024-04-12 DIAGNOSIS — F33.1 MODERATE EPISODE OF RECURRENT MAJOR DEPRESSIVE DISORDER (H): ICD-10-CM

## 2024-04-12 NOTE — CONFIDENTIAL NOTE
Med: WELLBUTRIN    LOV (related): 9/15/23      Due for F/U around: OVERDUE    Next Appt: NONE            3/15/2023    10:14 AM 9/15/2023     4:22 PM 3/27/2024     2:20 PM   PHQ   PHQ-9 Total Score 0 0 1   Q9: Thoughts of better off dead/self-harm past 2 weeks Not at all Not at all Not at all           3/15/2023    10:14 AM 9/15/2023     4:22 PM 3/27/2024     2:20 PM   BLANCO-7 SCORE   Total Score 2 3 0

## 2024-04-16 RX ORDER — BUPROPION HYDROCHLORIDE 300 MG/1
TABLET ORAL
Qty: 30 TABLET | Refills: 0 | Status: SHIPPED | OUTPATIENT
Start: 2024-04-16 | End: 2024-05-14

## 2024-04-29 SDOH — HEALTH STABILITY: PHYSICAL HEALTH: ON AVERAGE, HOW MANY MINUTES DO YOU ENGAGE IN EXERCISE AT THIS LEVEL?: 30 MIN

## 2024-04-29 SDOH — HEALTH STABILITY: PHYSICAL HEALTH: ON AVERAGE, HOW MANY DAYS PER WEEK DO YOU ENGAGE IN MODERATE TO STRENUOUS EXERCISE (LIKE A BRISK WALK)?: 5 DAYS

## 2024-04-29 ASSESSMENT — SOCIAL DETERMINANTS OF HEALTH (SDOH): HOW OFTEN DO YOU GET TOGETHER WITH FRIENDS OR RELATIVES?: THREE TIMES A WEEK

## 2024-05-06 ENCOUNTER — OFFICE VISIT (OUTPATIENT)
Dept: FAMILY MEDICINE | Facility: CLINIC | Age: 64
End: 2024-05-06

## 2024-05-06 VITALS
BODY MASS INDEX: 26.2 KG/M2 | OXYGEN SATURATION: 99 % | HEIGHT: 66 IN | WEIGHT: 163 LBS | SYSTOLIC BLOOD PRESSURE: 132 MMHG | DIASTOLIC BLOOD PRESSURE: 86 MMHG | HEART RATE: 74 BPM

## 2024-05-06 DIAGNOSIS — F33.1 MODERATE EPISODE OF RECURRENT MAJOR DEPRESSIVE DISORDER (H): ICD-10-CM

## 2024-05-06 DIAGNOSIS — Z00.00 ROUTINE GENERAL MEDICAL EXAMINATION AT A HEALTH CARE FACILITY: Primary | ICD-10-CM

## 2024-05-06 DIAGNOSIS — I10 ESSENTIAL HYPERTENSION WITH GOAL BLOOD PRESSURE LESS THAN 130/80: ICD-10-CM

## 2024-05-06 DIAGNOSIS — F41.1 GAD (GENERALIZED ANXIETY DISORDER): ICD-10-CM

## 2024-05-06 DIAGNOSIS — E03.9 ACQUIRED HYPOTHYROIDISM: ICD-10-CM

## 2024-05-06 DIAGNOSIS — E78.5 HYPERLIPIDEMIA LDL GOAL <100: ICD-10-CM

## 2024-05-06 DIAGNOSIS — G25.81 RESTLESS LEG SYNDROME: ICD-10-CM

## 2024-05-06 LAB
ALBUMIN SERPL BCG-MCNC: 4.4 G/DL (ref 3.5–5.2)
ALP SERPL-CCNC: 84 U/L (ref 40–150)
ALT SERPL W P-5'-P-CCNC: 30 U/L (ref 0–50)
ANION GAP SERPL CALCULATED.3IONS-SCNC: 12 MMOL/L (ref 7–15)
AST SERPL W P-5'-P-CCNC: 27 U/L (ref 0–45)
BILIRUB SERPL-MCNC: 0.4 MG/DL
BUN SERPL-MCNC: 20.7 MG/DL (ref 8–23)
CALCIUM SERPL-MCNC: 9.7 MG/DL (ref 8.8–10.2)
CHLORIDE SERPL-SCNC: 103 MMOL/L (ref 98–107)
CHOLESTEROL: 231 MG/DL (ref 100–199)
CREAT SERPL-MCNC: 0.96 MG/DL (ref 0.51–0.95)
DEPRECATED HCO3 PLAS-SCNC: 26 MMOL/L (ref 22–29)
EGFRCR SERPLBLD CKD-EPI 2021: 66 ML/MIN/1.73M2
FASTING?: YES
GLUCOSE SERPL-MCNC: 97 MG/DL (ref 70–99)
HDL (RMG): 64 MG/DL (ref 40–?)
LDL CALCULATED (RMG): 145 MG/DL (ref 0–130)
POTASSIUM SERPL-SCNC: 4.1 MMOL/L (ref 3.4–5.3)
PROT SERPL-MCNC: 7.1 G/DL (ref 6.4–8.3)
SODIUM SERPL-SCNC: 141 MMOL/L (ref 135–145)
T4 FREE SERPL-MCNC: 1.04 NG/DL (ref 0.9–1.7)
TRIGLYCERIDES (RMG): 115 MG/DL (ref 0–149)
TSH SERPL DL<=0.005 MIU/L-ACNC: 5.16 UIU/ML (ref 0.3–4.2)

## 2024-05-06 PROCEDURE — 84439 ASSAY OF FREE THYROXINE: CPT

## 2024-05-06 PROCEDURE — 36415 COLL VENOUS BLD VENIPUNCTURE: CPT

## 2024-05-06 PROCEDURE — 80061 LIPID PANEL: CPT | Mod: QW

## 2024-05-06 PROCEDURE — 80053 COMPREHEN METABOLIC PANEL: CPT

## 2024-05-06 PROCEDURE — 84443 ASSAY THYROID STIM HORMONE: CPT

## 2024-05-06 PROCEDURE — 99396 PREV VISIT EST AGE 40-64: CPT

## 2024-05-06 RX ORDER — CLONAZEPAM 1 MG/1
1 TABLET ORAL
Qty: 30 TABLET | Refills: 0 | Status: SHIPPED | OUTPATIENT
Start: 2024-05-06

## 2024-05-06 NOTE — PATIENT INSTRUCTIONS
Preventive Care Advice   This is general advice given by our system to help you stay healthy. However, your care team may have specific advice just for you. Please talk to your care team about your preventive care needs.  Nutrition  Eat 5 or more servings of fruits and vegetables each day.  Try wheat bread, brown rice and whole grain pasta (instead of white bread, rice, and pasta).  Get enough calcium and vitamin D. Check the label on foods and aim for 100% of the RDA (recommended daily allowance).  Lifestyle  Exercise at least 150 minutes each week   (30 minutes a day, 5 days a week).  Do muscle strengthening activities 2 days a week. These help control your weight and prevent disease.  No smoking.  Wear sunscreen to prevent skin cancer.  Have a dental exam and cleaning every 6 months.  Yearly exams  See your health care team every year to talk about:  Any changes in your health.  Any medicines your care team has prescribed.  Preventive care, family planning, and ways to prevent chronic diseases.  Shots (vaccines)   HPV shots (up to age 26), if you've never had them before.  Hepatitis B shots (up to age 59), if you've never had them before.  COVID-19 shot: Get this shot when it's due.  Flu shot: Get a flu shot every year.  Tetanus shot: Get a tetanus shot every 10 years.  Pneumococcal, hepatitis A, and RSV shots: Ask your care team if you need these based on your risk.  Shingles shot (for age 50 and up).  General health tests  Diabetes screening:  Starting at age 35, Get screened for diabetes at least every 3 years.  If you are younger than age 35, ask your care team if you should be screened for diabetes.  Cholesterol test: At age 39, start having a cholesterol test every 5 years, or more often if advised.  Bone density scan (DEXA): At age 50, ask your care team if you should have this scan for osteoporosis (brittle bones).  Hepatitis C: Get tested at least once in your life.  STIs (sexually transmitted  infections)  Before age 24: Ask your care team if you should be screened for STIs.  After age 24: Get screened for STIs if you're at risk. You are at risk for STIs (including HIV) if:  You are sexually active with more than one person.  You don't use condoms every time.  You or a partner was diagnosed with a sexually transmitted infection.  If you are at risk for HIV, ask about PrEP medicine to prevent HIV.  Get tested for HIV at least once in your life, whether you are at risk for HIV or not.  Cancer screening tests  Cervical cancer screening: If you have a cervix, begin getting regular cervical cancer screening tests at age 21. Most people who have regular screenings with normal results can stop after age 65. Talk about this with your provider.  Breast cancer scan (mammogram): If you've ever had breasts, begin having regular mammograms starting at age 40. This is a scan to check for breast cancer.  Colon cancer screening: It is important to start screening for colon cancer at age 45.  Have a colonoscopy test every 10 years (or more often if you're at risk) Or, ask your provider about stool tests like a FIT test every year or Cologuard test every 3 years.  To learn more about your testing options, visit: https://www.[employer]/227675.pdf.  For help making a decision, visit: https://bit.ly/oo21901.  Prostate cancer screening test: If you have a prostate and are age 55 to 69, ask your provider if you would benefit from a yearly prostate cancer screening test.  Lung cancer screening: If you are a current or former smoker age 50 to 80, ask your care team if ongoing lung cancer screenings are right for you.  For informational purposes only. Not to replace the advice of your health care provider. Copyright   2023 [employer]. All rights reserved. Clinically reviewed by the [facility] Transitions Program. [employer] 309679 - REV 01/24.    Learning About Stress  What is stress?     Stress is your  body's response to a hard situation. Your body can have a physical, emotional, or mental response. Stress is a fact of life for most people, and it affects everyone differently. What causes stress for you may not be stressful for someone else.  A lot of things can cause stress. You may feel stress when you go on a job interview, take a test, or run a race. This kind of short-term stress is normal and even useful. It can help you if you need to work hard or react quickly. For example, stress can help you finish an important job on time.  Long-term stress is caused by ongoing stressful situations or events. Examples of long-term stress include long-term health problems, ongoing problems at work, or conflicts in your family. Long-term stress can harm your health.  How does stress affect your health?  When you are stressed, your body responds as though you are in danger. It makes hormones that speed up your heart, make you breathe faster, and give you a burst of energy. This is called the fight-or-flight stress response. If the stress is over quickly, your body goes back to normal and no harm is done.  But if stress happens too often or lasts too long, it can have bad effects. Long-term stress can make you more likely to get sick, and it can make symptoms of some diseases worse. If you tense up when you are stressed, you may develop neck, shoulder, or low back pain. Stress is linked to high blood pressure and heart disease.  Stress also harms your emotional health. It can make you lechuga, tense, or depressed. Your relationships may suffer, and you may not do well at work or school.  What can you do to manage stress?  You can try these things to help manage stress:   Do something active. Exercise or activity can help reduce stress. Walking is a great way to get started. Even everyday activities such as housecleaning or yard work can help.  Try yoga or jeanie chi. These techniques combine exercise and meditation. You may need  some training at first to learn them.  Do something you enjoy. For example, listen to music or go to a movie. Practice your hobby or do volunteer work.  Meditate. This can help you relax, because you are not worrying about what happened before or what may happen in the future.  Do guided imagery. Imagine yourself in any setting that helps you feel calm. You can use online videos, books, or a teacher to guide you.  Do breathing exercises. For example:  From a standing position, bend forward from the waist with your knees slightly bent. Let your arms dangle close to the floor.  Breathe in slowly and deeply as you return to a standing position. Roll up slowly and lift your head last.  Hold your breath for just a few seconds in the standing position.  Breathe out slowly and bend forward from the waist.  Let your feelings out. Talk, laugh, cry, and express anger when you need to. Talking with supportive friends or family, a counselor, or a rodrigo leader about your feelings is a healthy way to relieve stress. Avoid discussing your feelings with people who make you feel worse.  Write. It may help to write about things that are bothering you. This helps you find out how much stress you feel and what is causing it. When you know this, you can find better ways to cope.  What can you do to prevent stress?  You might try some of these things to help prevent stress:  Manage your time. This helps you find time to do the things you want and need to do.  Get enough sleep. Your body recovers from the stresses of the day while you are sleeping.  Get support. Your family, friends, and community can make a difference in how you experience stress.  Limit your news feed. Avoid or limit time on social media or news that may make you feel stressed.  Do something active. Exercise or activity can help reduce stress. Walking is a great way to get started.  Where can you learn more?  Go to https://www.healthwise.net/patiented  Enter N032 in the  "search box to learn more about \"Learning About Stress.\"  Current as of: October 24, 2023               Content Version: 14.0    5245-7721 [employer].   Care instructions adapted under license by your healthcare professional. If you have questions about a medical condition or this instruction, always ask your healthcare professional. [employer] disclaims any warranty or liability for your use of this information.      "

## 2024-05-06 NOTE — PROGRESS NOTES
Preventive Care Visit  [facility]  LUPE Pinedo CNP, Nurse Practitioner Primary Care  May 6, 2024            4/29/2024   General Health   How would you rate your overall physical health? Good   Feel stress (tense, anxious, or unable to sleep) To some extent   (!) STRESS CONCERN      4/29/2024   Nutrition   Three or more servings of calcium each day? Yes   Diet: Low fat/cholesterol    Carbohydrate counting   How many servings of fruit and vegetables per day? (!) 2-3   How many sweetened beverages each day? 0-1         4/29/2024   Exercise   Days per week of moderate/strenous exercise 5 days   Average minutes spent exercising at this level 30 min         4/29/2024   Social Factors   Frequency of gathering with friends or relatives Three times a week   Worry food won't last until get money to buy more No   Food not last or not have enough money for food? No   Do you have housing?  No   Are you worried about losing your housing? No   Lack of transportation? No   Unable to get utilities (heat,electricity)? No   Want help with housing or utility concern? No   (!) HOUSING CONCERN PRESENT      4/29/2024   Fall Risk   Fallen 2 or more times in the past year? No   Trouble with walking or balance? No          4/29/2024   Dental   Dentist two times every year? Yes     Hearing Screen  Left ear:  500Hz  Pass  1000Hz  Pass  2000Hz  Pass  4000Hz  Pass    Right ear:  500Hz  Pass  1000Hz  Pass  2000Hz  Pass  4000Hz  Pass        4/29/2024   TB Screening   Were you born outside of the [place]? No             4/29/2024   Substance Use   Alcohol more than 3/day or more than 7/wk No   Do you use any other substances recreationally? No     Social History     Tobacco Use    Smoking status: Never    Smokeless tobacco: Never   Substance Use Topics    Alcohol use: Yes     Alcohol/week: 2.0 standard drinks of alcohol     Types: 2 Standard drinks or equivalent per week    Drug use: No           3/27/2023   LAST FHS-7 RESULTS   1st  degree relative breast or ovarian cancer Yes   Any relative bilateral breast cancer No   Any male have breast cancer No   Any ONE woman have BOTH breast AND ovarian cancer No   Any woman with breast cancer before 50yrs Yes   2 or more relatives with breast AND/OR ovarian cancer Yes   2 or more relatives with breast AND/OR bowel cancer No           4/29/2024   STI Screening   New sexual partner(s) since last STI/HIV test? No

## 2024-05-06 NOTE — PROGRESS NOTES
Preventive Care Visit  [facility]  LUPE Pinedo CNP, Nurse Practitioner Primary Care  May 6, 2024       SUBJECTIVE:   Ana is a 63 year old, presenting for the following:  Physical (Fasting, not concerns) and Health Maintenance (PAP: s/p total hysterectomy (20+ years ago)/Mammo: UTD - 03/2023/Colonoscopy: UTD - 09/2023/Vaccines: UTD)      HPI    1.) HTN: managed on lisinopril-hydrochlorothiazide     2.) Hypothyroidism: managed on levothyroxine     3.) RLS: Klonopin 1 mg at bedtime prn. Uses x 2 per month. Some improvement in RLS  with introduction of weighted blanket. #30 tabs last 12 months.     4.) anxiety/depression: wellbutrin, sertraline, and clonazepam - seeing a counselor -  had massive stroke at 56 y/o, and son passed away - this is helping      5.) Hyperlipidemia: managed on simvastatin      Preventative Care:   UTD on colonoscopy - 07/2023, due in 2028  Mammogram: due   Pap: - total hysterectomy       Social History     Tobacco Use    Smoking status: Never    Smokeless tobacco: Never   Substance Use Topics    Alcohol use: Yes     Alcohol/week: 2.0 standard drinks of alcohol     Types: 2 Standard drinks or equivalent per week             4/29/2024     9:46 AM   Alcohol Use   Prescreen: >3 drinks/day or >7 drinks/week? No          No data to display              Reviewed orders with patient.  Reviewed health maintenance and updated orders accordingly - Yes  Lab work is in process  BP Readings from Last 3 Encounters:   05/06/24 132/86   03/27/24 125/64   02/14/24 139/75    Wt Readings from Last 3 Encounters:   05/06/24 73.9 kg (163 lb)   03/27/24 74.4 kg (164 lb)   01/22/24 73.5 kg (162 lb)           Breast Cancer Screening: recent MRI     FHS-7:       10/28/2021     8:31 AM 3/27/2023     2:47 PM   Breast CA Risk Assessment (FHS-7)   Did any of your first-degree relatives have breast or ovarian cancer? Yes Yes   Did any of your relatives have bilateral breast cancer? No No   Did any  "man in your family have breast cancer? No No   Did any woman in your family have breast and ovarian cancer? No No   Did any woman in your family have breast cancer before age 50 y? Yes Yes   Do you have 2 or more relatives with breast and/or ovarian cancer? Yes Yes   Do you have 2 or more relatives with breast and/or bowel cancer? Yes No       Mammogram Screening - Mammogram every 1-2 years updated in Health Maintenance based on mutual decision making  Pertinent mammograms are reviewed under the imaging tab.    History of abnormal Pap smear: NO - age 30-65 PAP every 5 years with negative HPV co-testing recommended     Reviewed and updated as needed this visit by clinical staff    Allergies  Meds  Problems             Reviewed and updated as needed this visit by Provider     Meds  Problems             Past Medical History:   Diagnosis Date    Asthma     while in [place] she had no asthma    Colon adenoma     GERD (gastroesophageal reflux disease)     High cholesterol     Migraines     Perioral dermatitis     Psoriasis 2013     developed in [place]- treated with steroids , just sarna in 2016    Restless leg syndrome     Rhinitis       Review of Systems    Review of Systems  Constitutional, HEENT, cardiovascular, pulmonary, gi and gu systems are negative, except as otherwise noted.    OBJECTIVE:   /86   Pulse 74   Ht 1.664 m (5' 5.5\")   Wt 73.9 kg (163 lb)   SpO2 99%   BMI 26.71 kg/m     Estimated body mass index is 26.71 kg/m  as calculated from the following:    Height as of this encounter: 1.664 m (5' 5.5\").    Weight as of this encounter: 73.9 kg (163 lb).  Physical Exam  GENERAL: alert and no distress  EYES: Eyes grossly normal to inspection, PERRL and conjunctivae and sclerae normal  HENT: ear canals and TM's normal, nose and mouth without ulcers or lesions  NECK: no adenopathy, no asymmetry, masses, or scars  RESP: lungs clear to auscultation - no rales, rhonchi or wheezes  CV: regular rate " "and rhythm, normal S1 S2, no S3 or S4, no murmur, click or rub, no peripheral edema  ABDOMEN: soft, nontender, no hepatosplenomegaly, no masses and bowel sounds normal  MS: no gross musculoskeletal defects noted, no edema  SKIN: no suspicious lesions or rashes  NEURO: Normal strength and tone, mentation intact and speech normal  PSYCH: mentation appears normal, affect normal/bright    Diagnostic Test Results:  Labs reviewed in Epic  No results found for this or any previous visit (from the past 24 hour(s)).    ASSESSMENT/PLAN:   Routine general medical examination at a health care facility  -Preventive health topics discussed including adequate exercise and healthy diet. Return to clinic in one year for preventative exam or sooner with any other concerns. Other issues discussed as noted below.     Moderate episode of recurrent major depressive disorder (H)  -stable on current regimen - sertraline and wellbutrin     Essential hypertension with goal blood pressure less than 130/80  -lisinopril-hydrochlorothiazide     Acquired hypothyroidism  -stable on levothyroxine for years - no new issues     Hyperlipidemia LDL goal <100  - stable on simvastatin     BLANCO (generalized anxiety disorder)  - stable on sertraline     RLS  -stable on clonazepam, used the last of her #30 prescribed on 7/26/23 a week ago   Patient has been advised of split billing requirements and indicates understanding: Yes      Counseling  Reviewed preventive health counseling, as reflected in patient instructions       Regular exercise       Healthy diet/nutrition       Vision screening       Osteoporosis prevention/bone health       Colorectal Cancer Screening       (Carmen)menopause management      BMI  Estimated body mass index is 26.71 kg/m  as calculated from the following:    Height as of this encounter: 1.664 m (5' 5.5\").    Weight as of this encounter: 73.9 kg (163 lb).   Weight management plan: Discussed healthy diet and exercise " guidelines      She reports that she has never smoked. She has never used smokeless tobacco.          Signed Electronically by: LUPE Pinedo CNP

## 2024-05-07 ENCOUNTER — PATIENT OUTREACH (OUTPATIENT)
Dept: GASTROENTEROLOGY | Facility: CLINIC | Age: 64
End: 2024-05-07
Payer: COMMERCIAL

## 2024-05-07 NOTE — PROGRESS NOTES
Patient part of [facility] and does not meet inclusion criteria for management by CRC team.

## 2024-05-14 DIAGNOSIS — F33.1 MODERATE EPISODE OF RECURRENT MAJOR DEPRESSIVE DISORDER (H): ICD-10-CM

## 2024-05-14 NOTE — TELEPHONE ENCOUNTER
Med: Bupropion      LOV (related): 5/6/24      Due for F/U around: 1 year for CPX      Next Appt: No current future appointments scheduled             3/15/2023    10:14 AM 9/15/2023     4:22 PM 3/27/2024     2:20 PM   PHQ   PHQ-9 Total Score 0 0 1   Q9: Thoughts of better off dead/self-harm past 2 weeks Not at all Not at all Not at all           3/15/2023    10:14 AM 9/15/2023     4:22 PM 3/27/2024     2:20 PM   BLANCO-7 SCORE   Total Score 2 3 0          OT IE Completed. Pt's DENISE Forrester cleared pt for OT. Pt received semisupine in bed, +heplock, +bed alarm, room air, +[place] cath, agreeable to OT. Pt's wife and pt's son present. Pt tolerated session poorly. Pt left as found, all lines in tact, needs in reach, +bed alarm. DENISE Forrester present and aware.

## 2024-05-15 RX ORDER — BUPROPION HYDROCHLORIDE 300 MG/1
TABLET ORAL
Qty: 90 TABLET | Refills: 0 | Status: SHIPPED | OUTPATIENT
Start: 2024-05-15 | End: 2024-07-19

## 2024-05-18 DIAGNOSIS — K21.9 GASTROESOPHAGEAL REFLUX DISEASE WITHOUT ESOPHAGITIS: ICD-10-CM

## 2024-06-24 DIAGNOSIS — E03.9 ACQUIRED HYPOTHYROIDISM: ICD-10-CM

## 2024-06-24 RX ORDER — LEVOTHYROXINE SODIUM 125 UG/1
125 TABLET ORAL DAILY
Qty: 90 TABLET | Refills: 3 | Status: SHIPPED | OUTPATIENT
Start: 2024-06-24

## 2024-06-24 NOTE — CONFIDENTIAL NOTE
Med: LEVOTHYROXINE    LOV (related): 5/6/24 - CPX    Last Lab: 5/6/24      Due for F/U around: 5/2025 FOR CPX    Next Appt: NONE        TSH   Date Value Ref Range Status   05/06/2024 5.16 (H) 0.30 - 4.20 uIU/mL Final     T4 Free   Date Value Ref Range Status   08/15/2018 1.08 0.82 - 1.77 ng/dL Final     Free T4   Date Value Ref Range Status   05/06/2024 1.04 0.90 - 1.70 ng/dL Final

## 2024-06-25 DIAGNOSIS — I10 ESSENTIAL HYPERTENSION WITH GOAL BLOOD PRESSURE LESS THAN 130/80: ICD-10-CM

## 2024-06-25 NOTE — TELEPHONE ENCOUNTER
Med: Lisinopril Hydrochlorothiazide     LOV (related): 5/6/24    Last Lab: 5/06/2024      Due for F/U around: 5/12/25    Next Appt: None        BP Readings from Last 3 Encounters:   05/06/24 132/86   03/27/24 125/64   02/14/24 139/75       Last Comprehensive Metabolic Panel:  Lab Results   Component Value Date     05/06/2024    POTASSIUM 4.1 05/06/2024    CHLORIDE 103 05/06/2024    CO2 26 05/06/2024    ANIONGAP 12 05/06/2024    GLC 97 05/06/2024    BUN 20.7 05/06/2024    CR 0.96 (H) 05/06/2024    GFRESTIMATED 66 05/06/2024    NEETU 9.7 05/06/2024

## 2024-06-26 RX ORDER — LISINOPRIL AND HYDROCHLOROTHIAZIDE 12.5; 2 MG/1; MG/1
1 TABLET ORAL DAILY
Qty: 90 TABLET | Refills: 3 | Status: SHIPPED | OUTPATIENT
Start: 2024-06-26

## 2024-07-19 ENCOUNTER — MYC REFILL (OUTPATIENT)
Dept: FAMILY MEDICINE | Facility: CLINIC | Age: 64
End: 2024-07-19

## 2024-07-19 DIAGNOSIS — I10 ESSENTIAL HYPERTENSION WITH GOAL BLOOD PRESSURE LESS THAN 130/80: ICD-10-CM

## 2024-07-19 DIAGNOSIS — N95.1 MENOPAUSAL SYNDROME (HOT FLASHES): ICD-10-CM

## 2024-07-19 DIAGNOSIS — F33.1 MODERATE EPISODE OF RECURRENT MAJOR DEPRESSIVE DISORDER (H): ICD-10-CM

## 2024-07-19 RX ORDER — BUPROPION HYDROCHLORIDE 300 MG/1
TABLET ORAL
Qty: 90 TABLET | Refills: 3 | Status: SHIPPED | OUTPATIENT
Start: 2024-07-19

## 2024-07-19 RX ORDER — POTASSIUM CHLORIDE 750 MG/1
20 CAPSULE, EXTENDED RELEASE ORAL DAILY
Qty: 180 CAPSULE | Refills: 3 | Status: SHIPPED | OUTPATIENT
Start: 2024-07-19

## 2024-07-19 RX ORDER — ESTRADIOL 0.5 MG/1
0.5 TABLET ORAL DAILY
Qty: 90 TABLET | Refills: 0 | Status: SHIPPED | OUTPATIENT
Start: 2024-07-19

## 2024-07-19 NOTE — TELEPHONE ENCOUNTER
Patient calling and is now out of medication, did not realize there was no refills on last prescription.   Had physical on 5/6/24    Estradiol-0.5mg    [facility] [address]

## 2024-07-19 NOTE — TELEPHONE ENCOUNTER
Med: bupropion, potassium     LOV (related): 5/6/24      Due for F/U around: 5/6/25    Next Appt: None            3/15/2023    10:14 AM 9/15/2023     4:22 PM 3/27/2024     2:20 PM   PHQ   PHQ-9 Total Score 0 0 1   Q9: Thoughts of better off dead/self-harm past 2 weeks Not at all Not at all Not at all           3/15/2023    10:14 AM 9/15/2023     4:22 PM 3/27/2024     2:20 PM   BLANCO-7 SCORE   Total Score 2 3 0

## 2024-08-01 ENCOUNTER — MEDICAL CORRESPONDENCE (OUTPATIENT)
Dept: FAMILY MEDICINE | Facility: CLINIC | Age: 64
End: 2024-08-01

## 2024-09-13 DIAGNOSIS — K21.9 GASTROESOPHAGEAL REFLUX DISEASE WITHOUT ESOPHAGITIS: ICD-10-CM

## 2024-09-14 NOTE — TELEPHONE ENCOUNTER
Med: Omeprazole    LOV (related): 8/6/24 CPX with Dr. Mcdowell       Due for F/U around: 8/6/2025 for CPX     Next Appt: None

## 2024-09-16 DIAGNOSIS — E78.5 HYPERLIPIDEMIA LDL GOAL <100: ICD-10-CM

## 2024-09-16 RX ORDER — SIMVASTATIN 10 MG
TABLET ORAL
Qty: 90 TABLET | Refills: 1 | Status: SHIPPED | OUTPATIENT
Start: 2024-09-16

## 2024-09-23 DIAGNOSIS — F33.1 MODERATE EPISODE OF RECURRENT MAJOR DEPRESSIVE DISORDER (H): ICD-10-CM

## 2024-09-24 NOTE — CONFIDENTIAL NOTE
Med: SERTRALINE    LOV (related): 5/6/24 - CPX      Due for F/U around: 5/2025 FOR CPX    Next Appt: NONE            3/15/2023    10:14 AM 9/15/2023     4:22 PM 3/27/2024     2:20 PM   PHQ   PHQ-9 Total Score 0 0 1   Q9: Thoughts of better off dead/self-harm past 2 weeks Not at all Not at all Not at all           3/15/2023    10:14 AM 9/15/2023     4:22 PM 3/27/2024     2:20 PM   BLANCO-7 SCORE   Total Score 2 3 0

## 2024-09-25 RX ORDER — SERTRALINE HYDROCHLORIDE 100 MG/1
100 TABLET, FILM COATED ORAL DAILY
Qty: 90 TABLET | Refills: 2 | Status: SHIPPED | OUTPATIENT
Start: 2024-09-25

## 2024-09-30 ENCOUNTER — OFFICE VISIT (OUTPATIENT)
Dept: FAMILY MEDICINE | Facility: CLINIC | Age: 64
End: 2024-09-30

## 2024-09-30 VITALS
SYSTOLIC BLOOD PRESSURE: 137 MMHG | OXYGEN SATURATION: 96 % | DIASTOLIC BLOOD PRESSURE: 66 MMHG | HEART RATE: 80 BPM | BODY MASS INDEX: 26.74 KG/M2 | WEIGHT: 163.2 LBS

## 2024-09-30 DIAGNOSIS — H00.024 HORDEOLUM INTERNUM OF LEFT UPPER EYELID: Primary | ICD-10-CM

## 2024-09-30 PROCEDURE — G2211 COMPLEX E/M VISIT ADD ON: HCPCS

## 2024-09-30 PROCEDURE — 99213 OFFICE O/P EST LOW 20 MIN: CPT

## 2024-09-30 RX ORDER — ERYTHROMYCIN 5 MG/G
0.5 OINTMENT OPHTHALMIC AT BEDTIME
Qty: 3.5 G | Refills: 0 | Status: SHIPPED | OUTPATIENT
Start: 2024-09-30 | End: 2024-09-30

## 2024-09-30 RX ORDER — ERYTHROMYCIN 5 MG/G
0.5 OINTMENT OPHTHALMIC 4 TIMES DAILY
Qty: 3.5 G | Refills: 0 | Status: SHIPPED | OUTPATIENT
Start: 2024-09-30 | End: 2024-10-07

## 2024-09-30 ASSESSMENT — ANXIETY QUESTIONNAIRES
5. BEING SO RESTLESS THAT IT IS HARD TO SIT STILL: SEVERAL DAYS
GAD7 TOTAL SCORE: 6
1. FEELING NERVOUS, ANXIOUS, OR ON EDGE: MORE THAN HALF THE DAYS
IF YOU CHECKED OFF ANY PROBLEMS ON THIS QUESTIONNAIRE, HOW DIFFICULT HAVE THESE PROBLEMS MADE IT FOR YOU TO DO YOUR WORK, TAKE CARE OF THINGS AT HOME, OR GET ALONG WITH OTHER PEOPLE: NOT DIFFICULT AT ALL
3. WORRYING TOO MUCH ABOUT DIFFERENT THINGS: SEVERAL DAYS
GAD7 TOTAL SCORE: 6
7. FEELING AFRAID AS IF SOMETHING AWFUL MIGHT HAPPEN: NOT AT ALL
2. NOT BEING ABLE TO STOP OR CONTROL WORRYING: SEVERAL DAYS
6. BECOMING EASILY ANNOYED OR IRRITABLE: NOT AT ALL

## 2024-09-30 ASSESSMENT — PATIENT HEALTH QUESTIONNAIRE - PHQ9
SUM OF ALL RESPONSES TO PHQ QUESTIONS 1-9: 1
5. POOR APPETITE OR OVEREATING: SEVERAL DAYS

## 2024-09-30 NOTE — PROGRESS NOTES
Assessment & Plan     Hordeolum internum of left upper eyelid  S/s consistent for a Hordeolum of left upper eyelid. Rx for Erythromycin, side effects reviewed. Has allergy to oral Erythromycin related to GI intolerance. Discussed warm compresses and OTC analgesics as needed for discomfort. Follow up as needed for new or worsening symptoms or if symptoms fail to improve. Red flags that warrant emergent evaluation discussed. Patient agreeable to plan. All questions answered.   - erythromycin (ROMYCIN) 5 MG/GM ophthalmic ointment  Dispense: 3.5 g; Refill: 0              Work on weight loss  Regular exercise  See Patient Instructions    Return if symptoms worsen or fail to improve, for Follow up.    Subjective   Ana is a 63 year old, presenting for the following health issues:  Eye Problem (L eye is swollen, red, and itchy for the past 3 days, seems fluid filled in the morning and crusted over per pt, family history of clogged tear ducts) and Imm/Inj (Pt has questions about RSV and pneumonia vaccines)    HPI       Eye(s) Problem  Onset/Duration: eye drainage, started on the top lid then on right. Went away but now retaining fluid in corner of eye. Eyes have been itchy and sore. Using visine drops. Hx of allergies and styes in the past. More pronounced and clears throughout the day. On day 3.   Description:   Location: Left eye  Pain: No, more itchy  Redness: YES  Accompanying Signs & Symptoms:  Discharge/mattering: YES- watery, couple weeks ago crusty  Swelling: YES- in the morning, felt like a stye  Visual changes: No  Fever: No  Nasal Congestion: No  Bothered by bright lights: No  History:  Trauma: No  Foreign body exposure: No, feels like sand in her eye  Wearing contacts: No  Precipitating or alleviating factors: None  Therapies tried and outcome: visine, hydrocortisone, Vanicream, stye ointment OTC        Review of Systems  Constitutional, HEENT, cardiovascular, pulmonary, gi and gu systems are negative, except  as otherwise noted.      Objective    /66   Pulse 80   Wt 74 kg (163 lb 3.2 oz)   SpO2 96%   BMI 26.74 kg/m    Body mass index is 26.74 kg/m .  Physical Exam   GENERAL: alert and no distress  EYES: eyelids- hordeolum/sty medial left upper eyelid  RESP: lungs clear to auscultation - no rales, rhonchi or wheezes  CV: regular rate and rhythm, normal S1 S2, no S3 or S4, no murmur, click or rub, no peripheral edema  PSYCH: mentation appears normal, affect normal/bright          Signed Electronically by: LUPE Henry CNP

## 2024-10-17 DIAGNOSIS — N95.1 MENOPAUSAL SYNDROME (HOT FLASHES): ICD-10-CM

## 2024-10-17 RX ORDER — ESTRADIOL 0.5 MG/1
0.5 TABLET ORAL DAILY
Qty: 90 TABLET | Refills: 2 | Status: SHIPPED | OUTPATIENT
Start: 2024-10-17

## 2024-10-17 NOTE — TELEPHONE ENCOUNTER
Med:  estradiol (ESTRACE) 0.5 MG tablet     LOV (related): 5/6/24 cpx      Due for F/U around: 5/2025 per note    Next Appt: none    
2

## 2025-03-22 ENCOUNTER — HEALTH MAINTENANCE LETTER (OUTPATIENT)
Age: 65
End: 2025-03-22

## 2025-05-05 ENCOUNTER — MYC REFILL (OUTPATIENT)
Dept: FAMILY MEDICINE | Facility: CLINIC | Age: 65
End: 2025-05-05

## 2025-05-05 DIAGNOSIS — E78.5 HYPERLIPIDEMIA LDL GOAL <100: ICD-10-CM

## 2025-05-05 DIAGNOSIS — G25.81 RESTLESS LEG SYNDROME: ICD-10-CM

## 2025-05-05 DIAGNOSIS — F33.1 MODERATE EPISODE OF RECURRENT MAJOR DEPRESSIVE DISORDER (H): ICD-10-CM

## 2025-05-05 RX ORDER — CLONAZEPAM 1 MG/1
1 TABLET ORAL
Qty: 30 TABLET | Refills: 0 | Status: CANCELLED | OUTPATIENT
Start: 2025-05-05

## 2025-05-05 RX ORDER — SERTRALINE HYDROCHLORIDE 100 MG/1
100 TABLET, FILM COATED ORAL DAILY
Qty: 15 TABLET | Refills: 0 | Status: SHIPPED | OUTPATIENT
Start: 2025-05-05

## 2025-05-05 NOTE — TELEPHONE ENCOUNTER
Patient sent MyChart refill request for sertraline 100mg QD and clonazepam 1mg .   Per patient comment in message:   I have 0 left of these and ran out last week. I was told on Friday the refill request had been in Claire s box- however I really need to have these refilled today since I ve been going without for more than 5 days.     Last related visit: 24 CPE with Claire Gallego NP   Future appt: 25 med check with Claire Gallego NP - scheduled on 25.    On 24 Claire Gallego NP sent clonazepam 1mg #30 sig:Take 1 tablet (1 mg) by mouth nightly as needed for anxiety or sleep (restless legs. to last at least 12 months.)       Fill history per [place]  :       Plan: okay per Claire Gallego NP to send sertraline 100mg #15 tabs si po daily and will discuss future fills of meds at  visit. Rx sent. Messaged patient this was done.

## 2025-05-06 RX ORDER — SIMVASTATIN 10 MG
TABLET ORAL
Qty: 30 TABLET | Refills: 0 | Status: SHIPPED | OUTPATIENT
Start: 2025-05-06

## 2025-05-14 ENCOUNTER — RESULTS FOLLOW-UP (OUTPATIENT)
Dept: FAMILY MEDICINE | Facility: CLINIC | Age: 65
End: 2025-05-14

## 2025-05-14 ENCOUNTER — OFFICE VISIT (OUTPATIENT)
Dept: FAMILY MEDICINE | Facility: CLINIC | Age: 65
End: 2025-05-14

## 2025-05-14 VITALS
OXYGEN SATURATION: 98 % | WEIGHT: 162 LBS | HEART RATE: 78 BPM | DIASTOLIC BLOOD PRESSURE: 49 MMHG | BODY MASS INDEX: 26.55 KG/M2 | SYSTOLIC BLOOD PRESSURE: 134 MMHG

## 2025-05-14 DIAGNOSIS — Z12.31 ENCOUNTER FOR SCREENING MAMMOGRAM FOR BREAST CANCER: ICD-10-CM

## 2025-05-14 DIAGNOSIS — E03.9 ACQUIRED HYPOTHYROIDISM: ICD-10-CM

## 2025-05-14 DIAGNOSIS — E78.5 HYPERLIPIDEMIA LDL GOAL <100: ICD-10-CM

## 2025-05-14 DIAGNOSIS — F33.1 MODERATE EPISODE OF RECURRENT MAJOR DEPRESSIVE DISORDER (H): ICD-10-CM

## 2025-05-14 DIAGNOSIS — G25.81 RESTLESS LEG SYNDROME: ICD-10-CM

## 2025-05-14 DIAGNOSIS — I10 ESSENTIAL HYPERTENSION WITH GOAL BLOOD PRESSURE LESS THAN 130/80: ICD-10-CM

## 2025-05-14 DIAGNOSIS — Z00.00 ROUTINE GENERAL MEDICAL EXAMINATION AT A HEALTH CARE FACILITY: Primary | ICD-10-CM

## 2025-05-14 LAB
ALBUMIN SERPL BCG-MCNC: 4.1 G/DL (ref 3.5–5.2)
ALP SERPL-CCNC: 86 U/L (ref 40–150)
ALT SERPL W P-5'-P-CCNC: 31 U/L (ref 0–50)
ANION GAP SERPL CALCULATED.3IONS-SCNC: 11 MMOL/L (ref 7–15)
AST SERPL W P-5'-P-CCNC: 28 U/L (ref 0–45)
BILIRUB SERPL-MCNC: 0.3 MG/DL
BUN SERPL-MCNC: 17.5 MG/DL (ref 8–23)
CALCIUM SERPL-MCNC: 9.6 MG/DL (ref 8.8–10.4)
CHLORIDE SERPL-SCNC: 101 MMOL/L (ref 98–107)
CHOLESTEROL: 213 MG/DL (ref 100–199)
CREAT SERPL-MCNC: 0.75 MG/DL (ref 0.51–0.95)
EGFRCR SERPLBLD CKD-EPI 2021: 88 ML/MIN/1.73M2
FASTING STATUS PATIENT QL REPORTED: YES
FASTING?: YES
GLUCOSE SERPL-MCNC: 105 MG/DL (ref 70–99)
HCO3 SERPL-SCNC: 26 MMOL/L (ref 22–29)
HDL (RMG): 63 MG/DL (ref 40–?)
LDL CALCULATED (RMG): 131 MG/DL (ref 0–130)
POTASSIUM SERPL-SCNC: 4.4 MMOL/L (ref 3.4–5.3)
PROT SERPL-MCNC: 6.6 G/DL (ref 6.4–8.3)
SODIUM SERPL-SCNC: 138 MMOL/L (ref 135–145)
TRIGLYCERIDES (RMG): 96 MG/DL (ref 0–149)
TSH SERPL DL<=0.005 MIU/L-ACNC: 1.55 UIU/ML (ref 0.3–4.2)

## 2025-05-14 PROCEDURE — 36415 COLL VENOUS BLD VENIPUNCTURE: CPT

## 2025-05-14 PROCEDURE — 3078F DIAST BP <80 MM HG: CPT

## 2025-05-14 PROCEDURE — 99397 PER PM REEVAL EST PAT 65+ YR: CPT

## 2025-05-14 PROCEDURE — 84443 ASSAY THYROID STIM HORMONE: CPT | Mod: 90

## 2025-05-14 PROCEDURE — 80061 LIPID PANEL: CPT | Mod: QW

## 2025-05-14 PROCEDURE — 3075F SYST BP GE 130 - 139MM HG: CPT

## 2025-05-14 PROCEDURE — 80053 COMPREHEN METABOLIC PANEL: CPT

## 2025-05-14 PROCEDURE — 84443 ASSAY THYROID STIM HORMONE: CPT

## 2025-05-14 RX ORDER — CLONAZEPAM 1 MG/1
1 TABLET ORAL
Qty: 30 TABLET | Refills: 0 | Status: SHIPPED | OUTPATIENT
Start: 2025-05-14

## 2025-05-14 RX ORDER — BUPROPION HYDROCHLORIDE 300 MG/1
TABLET ORAL
Qty: 90 TABLET | Refills: 3 | Status: SHIPPED | OUTPATIENT
Start: 2025-07-12

## 2025-05-14 RX ORDER — LEVOTHYROXINE SODIUM 125 UG/1
125 TABLET ORAL DAILY
Qty: 90 TABLET | Refills: 3 | Status: SHIPPED | OUTPATIENT
Start: 2025-06-19

## 2025-05-14 RX ORDER — CLONAZEPAM 1 MG/1
1 TABLET ORAL
Qty: 30 TABLET | Refills: 0 | Status: CANCELLED | OUTPATIENT
Start: 2025-05-14

## 2025-05-14 RX ORDER — LISINOPRIL AND HYDROCHLOROTHIAZIDE 12.5; 2 MG/1; MG/1
1 TABLET ORAL DAILY
Qty: 90 TABLET | Refills: 3 | Status: SHIPPED | OUTPATIENT
Start: 2025-06-19

## 2025-05-14 RX ORDER — SERTRALINE HYDROCHLORIDE 100 MG/1
100 TABLET, FILM COATED ORAL DAILY
Qty: 90 TABLET | Refills: 3 | Status: SHIPPED | OUTPATIENT
Start: 2025-05-14

## 2025-05-14 RX ORDER — CLOBETASOL PROPIONATE 0.5 MG/G
OINTMENT TOPICAL 2 TIMES DAILY
COMMUNITY
Start: 2024-10-31

## 2025-05-14 RX ORDER — SIMVASTATIN 10 MG
TABLET ORAL
Qty: 90 TABLET | Refills: 3 | Status: SHIPPED | OUTPATIENT
Start: 2025-05-14

## 2025-05-14 SDOH — HEALTH STABILITY: PHYSICAL HEALTH: ON AVERAGE, HOW MANY DAYS PER WEEK DO YOU ENGAGE IN MODERATE TO STRENUOUS EXERCISE (LIKE A BRISK WALK)?: 5 DAYS

## 2025-05-14 SDOH — HEALTH STABILITY: PHYSICAL HEALTH: ON AVERAGE, HOW MANY MINUTES DO YOU ENGAGE IN EXERCISE AT THIS LEVEL?: 30 MIN

## 2025-05-14 ASSESSMENT — PATIENT HEALTH QUESTIONNAIRE - PHQ9
5. POOR APPETITE OR OVEREATING: NOT AT ALL
SUM OF ALL RESPONSES TO PHQ QUESTIONS 1-9: 0

## 2025-05-14 ASSESSMENT — ANXIETY QUESTIONNAIRES
6. BECOMING EASILY ANNOYED OR IRRITABLE: NOT AT ALL
7. FEELING AFRAID AS IF SOMETHING AWFUL MIGHT HAPPEN: NOT AT ALL
2. NOT BEING ABLE TO STOP OR CONTROL WORRYING: NOT AT ALL
5. BEING SO RESTLESS THAT IT IS HARD TO SIT STILL: NOT AT ALL
3. WORRYING TOO MUCH ABOUT DIFFERENT THINGS: NOT AT ALL
IF YOU CHECKED OFF ANY PROBLEMS ON THIS QUESTIONNAIRE, HOW DIFFICULT HAVE THESE PROBLEMS MADE IT FOR YOU TO DO YOUR WORK, TAKE CARE OF THINGS AT HOME, OR GET ALONG WITH OTHER PEOPLE: NOT DIFFICULT AT ALL
GAD7 TOTAL SCORE: 0
1. FEELING NERVOUS, ANXIOUS, OR ON EDGE: NOT AT ALL
GAD7 TOTAL SCORE: 0

## 2025-05-14 ASSESSMENT — SOCIAL DETERMINANTS OF HEALTH (SDOH): HOW OFTEN DO YOU GET TOGETHER WITH FRIENDS OR RELATIVES?: TWICE A WEEK

## 2025-05-14 ASSESSMENT — ASTHMA QUESTIONNAIRES: ACT_TOTALSCORE: 25

## 2025-05-14 NOTE — PATIENT INSTRUCTIONS
Patient Education   Preventive Care Advice   This is general advice given by our system to help you stay healthy. However, your care team may have specific advice just for you. Please talk to your care team about your preventive care needs.  Nutrition  Eat 5 or more servings of fruits and vegetables each day.  Try wheat bread, brown rice and whole grain pasta (instead of white bread, rice, and pasta).  Get enough calcium and vitamin D. Check the label on foods and aim for 100% of the RDA (recommended daily allowance).  Lifestyle  Exercise at least 150 minutes each week  (30 minutes a day, 5 days a week).  Do muscle strengthening activities 2 days a week. These help control your weight and prevent disease.  No smoking.  Wear sunscreen to prevent skin cancer.  Have a dental exam and cleaning every 6 months.  Yearly exams  See your health care team every year to talk about:  Any changes in your health.  Any medicines your care team has prescribed.  Preventive care, family planning, and ways to prevent chronic diseases.  Shots (vaccines)   HPV shots (up to age 26), if you've never had them before.  Hepatitis B shots (up to age 59), if you've never had them before.  COVID-19 shot: Get this shot when it's due.  Flu shot: Get a flu shot every year.  Tetanus shot: Get a tetanus shot every 10 years.  Pneumococcal, hepatitis A, and RSV shots: Ask your care team if you need these based on your risk.  Shingles shot (for age 50 and up)  General health tests  Diabetes screening:  Starting at age 35, Get screened for diabetes at least every 3 years.  If you are younger than age 35, ask your care team if you should be screened for diabetes.  Cholesterol test: At age 39, start having a cholesterol test every 5 years, or more often if advised.  Bone density scan (DEXA): At age 50, ask your care team if you should have this scan for osteoporosis (brittle bones).  Hepatitis C: Get tested at least once in your life.  STIs (sexually  transmitted infections)  Before age 24: Ask your care team if you should be screened for STIs.  After age 24: Get screened for STIs if you're at risk. You are at risk for STIs (including HIV) if:  You are sexually active with more than one person.  You don't use condoms every time.  You or a partner was diagnosed with a sexually transmitted infection.  If you are at risk for HIV, ask about PrEP medicine to prevent HIV.  Get tested for HIV at least once in your life, whether you are at risk for HIV or not.  Cancer screening tests  Cervical cancer screening: If you have a cervix, begin getting regular cervical cancer screening tests starting at age 21.  Breast cancer scan (mammogram): If you've ever had breasts, begin having regular mammograms starting at age 40. This is a scan to check for breast cancer.  Colon cancer screening: It is important to start screening for colon cancer at age 45.  Have a colonoscopy test every 10 years (or more often if you're at risk) Or, ask your provider about stool tests like a FIT test every year or Cologuard test every 3 years.  To learn more about your testing options, visit:   .  For help making a decision, visit:   https://bit.ly/ms37075.  Prostate cancer screening test: If you have a prostate, ask your care team if a prostate cancer screening test (PSA) at age 55 is right for you.  Lung cancer screening: If you are a current or former smoker ages 50 to 80, ask your care team if ongoing lung cancer screenings are right for you.  For informational purposes only. Not to replace the advice of your health care provider. Copyright   2023 [employer]. All rights reserved. Clinically reviewed by the [facility]. [employer] 842425 - REV 01/24.

## 2025-05-14 NOTE — LETTER
My Asthma Action Plan    Name: Radha Bobo   YOB: 1960  Date: 5/14/2025   My doctor: LUPE Pinedo CNP   My clinic: [facility]        My Rescue Medicine: Albuterol (Proair/Ventolin/Proventil HFA) 2-4 puffs EVERY 4 HOURS as needed. Use a spacer if recommended by your provider.   My Asthma Severity:   Intermittent / Exercise Induced  Know your asthma triggers: Patient is unaware of triggers             GREEN ZONE   Good Control  I feel good  No cough or wheeze  Can work, sleep and play without asthma symptoms       Take your asthma control medicine every day.     If exercise triggers your asthma, take your rescue medication  15 minutes before exercise or sports, and  During exercise if you have asthma symptoms  Spacer to use with inhaler: If you have a spacer, make sure to use it with your inhaler             YELLOW ZONE Getting Worse  I have ANY of these:  I do not feel good  Cough or wheeze  Chest feels tight  Wake up at night   Keep taking your Green Zone medications  Start taking your rescue medicine:  every 20 minutes for up to 1 hour. Then every 4 hours for 24-48 hours.  If you stay in the Yellow Zone for more than 12-24 hours, contact your doctor.  If you do not return to the Green Zone in 12-24 hours or you get worse, start taking your oral steroid medicine if prescribed by your provider.           RED ZONE Medical Alert - Get Help  I have ANY of these:  I feel awful  Medicine is not helping  Breathing getting harder  Trouble walking or talking  Nose opens wide to breathe       Take your rescue medicine NOW  If your provider has prescribed an oral steroid medicine, start taking it NOW  Call your doctor NOW  If you are still in the Red Zone after 20 minutes and you have not reached your doctor:  Take your rescue medicine again and  Call 911 or go to the emergency room right away    See your regular doctor within 2 weeks of an Emergency Room or Urgent Care visit for  follow-up treatment.          Annual Reminders:  Meet with Asthma Educator,  Flu Shot in the Fall, consider Pneumonia Vaccination for patients with asthma (aged 19 and older).    Pharmacy:    [facility] - FAX ONLY  [id number] - [place], [place] - [address] AT [facility] [address]  [id number] - [place], [place] - [address] [facility]    Electronically signed by LUPE Pinedo CNP   Date: 05/14/25                    Asthma Triggers  How To Control Things That Make Your Asthma Worse    Triggers are things that make your asthma worse.  Look at the list below to help you find your triggers and   what you can do about them. You can help prevent asthma flare-ups by staying away from your triggers.      Trigger                                                          What you can do   Cigarette Smoke  Tobacco smoke can make asthma worse. Do not allow smoking in your home, car or around you.  Be sure no one smokes at a child s day care or school.  If you smoke, ask your health care provider for ways to help you quit.  Ask family members to quit too.  Ask your health care provider for a referral to Quit Plan to help you quit smoking, or call 9-321-460-PLAN.     Colds, Flu, Bronchitis  These are common triggers of asthma. Wash your hands often.  Don t touch your eyes, nose or mouth.  Get a flu shot every year.     Dust Mites  These are tiny bugs that live in cloth or carpet. They are too small to see. Wash sheets and blankets in hot water every week.   Encase pillows and mattress in dust mite proof covers.  Avoid having carpet if you can. If you have carpet, vacuum weekly.   Use a dust mask and HEPA vacuum.   Pollen and Outdoor Mold  Some people are allergic to trees, grass, or weed pollen, or molds. Try to keep your windows closed.  Limit time out doors when pollen count is high.   Ask you health care provider about taking medicine during allergy season.     Animal Dander  Some  people are allergic to skin flakes, urine or saliva from pets with fur or feathers. Keep pets with fur or feathers out of your home.    If you can t keep the pet outdoors, then keep the pet out of your bedroom.  Keep the bedroom door closed.  Keep pets off cloth furniture and away from stuffed toys.     Mice, Rats, and Cockroaches  Some people are allergic to the waste from these pests.   Cover food and garbage.  Clean up spills and food crumbs.  Store grease in the refrigerator.   Keep food out of the bedroom.   Indoor Mold  This can be a trigger if your home has high moisture. Fix leaking faucets, pipes, or other sources of water.   Clean moldy surfaces.  Dehumidify basement if it is damp and smelly.   Smoke, Strong Odors, and Sprays  These can reduce air quality. Stay away from strong odors and sprays, such as perfume, powder, hair spray, paints, smoke incense, paint, cleaning products, candles and new carpet.   Exercise or Sports  Some people with asthma have this trigger. Be active!  Ask your doctor about taking medicine before sports or exercise to prevent symptoms.    Warm up for 5-10 minutes before and after sports or exercise.     Other Triggers of Asthma  Cold air:  Cover your nose and mouth with a scarf.  Sometimes laughing or crying can be a trigger.  Some medicines and food can trigger asthma.

## 2025-05-14 NOTE — LETTER
[facility]  05/14/25  Patient: Radha Bobo  YOB: 1960  Medical Record Number: [id number]                                                                                  Non-Opioid Controlled Substance Agreement    This is an agreement between you and your provider regarding safe and appropriate use of controlled substances prescribed by your care team. Controlled substances are?medicines that can cause physical and mental dependence (abuse).     There are strict laws about having and using these medicines. We here at [facility] are  committed to working with you in your efforts to get better. To support you in this work, we'll help you schedule regular office appointments for medicine refills. If we must cancel or change your appointment for any reason, we'll make sure you have enough medicine to last until your next appointment.     As a Provider, I will:   Listen carefully to your concerns while treating you with respect.   Recommend a treatment plan that I believe is in your best interest and may involve therapies other than medicine.    Talk with you often about the possible benefits and the risk of harm of any medicine that we prescribe for you.  Assess the safety of this medicine and check how well it works.    Provide a plan on how to taper (discontinue or go off) using this medicine if the decision is made to stop its use.      ::  As a Patient, I understand controlled substances:     Are prescribed by my care provider to help me function or work and manage my condition(s).?  Are strong medicines and can cause serious side effects.     Need to be taken exactly as prescribed.?Combining controlled substances with certain medicines or chemicals (such as illegal drugs, alcohol, sedatives, sleeping pills, and benzodiazepines) can be dangerous or even fatal.? If I stop taking my medicines suddenly, I may have severe withdrawal symptoms.     The risks, benefits, and side  effects of these medicine(s) were explained to me. I agree that:    I will take part in other treatments as advised by my care team. This may be psychiatry or counseling, physical therapy, behavioral therapy, group treatment or a referral to specialist.    I will keep all my appointments and understand this is part of the monitoring of controlled substances.?My care team may require an office visit for EVERY controlled substance refill. If I miss appointments or don t follow instructions, my care team may stop my medicine    I will take my medicines as prescribed. I will not change the dose or schedule unless my care team tells me to. There will be no refills if I run out early.      I may be asked to come to the clinic and complete a urine drug test or complete a pill count. If I don t give a urine sample or participate in a pill count, the care team may stop my medicine.    I will only receive controlled substance prescriptions from this clinic. If I am treated by another provider, I will tell them that I am taking controlled substances and that I have a treatment agreement with this provider. I will inform my [facility] care team within one business day if I am given a prescription for any controlled substance by another healthcare provider. My [facility] care team can contact other providers and pharmacists about my use of any medicines.    It is up to me to make sure that I don't run out of my medicines on weekends or holidays.?If my care team is willing to refill my prescription without a visit, I must request refills only during office hours. Refills may take up to 3 business days to process. I will use one pharmacy to fill all my controlled substance prescriptions. I will notify the clinic about any changes to my insurance or medicine availability.    I am responsible for my prescriptions. If the medicine/prescription is lost, stolen or destroyed, it will not be replaced.?I also agree not to  share controlled substance medicines with anyone.     I am aware I should not use any illegal or recreational drugs. I agree not to drink alcohol unless my care team says I can.     If I enroll in the [place] Medical Cannabis program, I will tell my care team before my next refill.    I will tell my care team right away if I become pregnant, have a new medical problem treated outside of my regular clinic, or have a change in my medicines.     I understand that this medicine can affect my thinking, judgment and reaction time.? Alcohol and drugs affect the brain and body, which can affect the safety of my driving. Being under the influence of alcohol or drugs can affect my decision-making, behaviors, personal safety and the safety of others. Driving while impaired (DWI) can occur if a person is driving, operating or in physical control of a car, motorcycle, boat, snowmobile, ATV, motorbike, off-road vehicle or any other motor vehicle ([place] Statute 169A.20). I understand the risk if I choose to drive or operate any vehicle or machinery.    I understand that if I do not follow any of the conditions above, my prescriptions or treatment may be stopped or changed.   I agree that my provider, clinic care team and pharmacy may work with any city, state or federal law enforcement agency that investigates the misuse, sale or other diversion of my controlled medicine. I will allow my provider to discuss my care with, or share a copy of, this agreement with any other treating provider, pharmacy or emergency room where I receive care.     I have read this agreement and have asked questions about anything I did not understand.    ________________________________________________________  Patient Signature - Radha Bobo     ___________________                   Date     ________________________________________________________  Provider Signature - LUPE Pinedo CNP       ___________________                   Date      ________________________________________________________  Witness Signature (required if provider not present while patient signing)          ___________________                   Date

## 2025-05-14 NOTE — PROGRESS NOTES
Preventive Care Visit  [facility]  LUPE Pinedo CNP, Nurse Practitioner Primary Care  May 14, 2025      Assessment & Plan     Routine general medical examination at a health care facility  -Preventive health topics discussed including adequate exercise and healthy diet. Return to clinic in one year for preventative exam or sooner with any other concerns. Other issues discussed as noted below.     Restless leg syndrome  - clonazePAM (KLONOPIN) 1 MG tablet  Dispense: 30 tablet; Refill: 0    Hyperlipidemia LDL goal <100  - diet and exercise discussed   - Lipid Profile ([facility])  - VENOUS COLLECTION  - simvastatin (ZOCOR) 10 MG tablet  Dispense: 90 tablet; Refill: 3    Moderate episode of recurrent major depressive disorder (H)  - stable, no SI/HI  - sertraline (ZOLOFT) 100 MG tablet  Dispense: 90 tablet; Refill: 3  - buPROPion (WELLBUTRIN XL) 300 MG 24 hr tablet  Dispense: 90 tablet; Refill: 3    Essential hypertension with goal blood pressure less than 130/80  - How to take home blood pressure:  Sit for 5 minutes with feet flat on the floor.  Apply cuff to upper arm (bicep area) and have this arm resting at about heart level.  Take blood pressure reading.  Make sure that the machine has a memory bank that records your readings or write readings down  If you remain elevated, greater than 130/80 then please return to clinic for further high blood pressure work up.    - Comprehensive metabolic panel  - VENOUS COLLECTION  - lisinopril-hydrochlorothiazide (ZESTORETIC) 20-12.5 MG tablet  Dispense: 90 tablet; Refill: 3  - Comprehensive metabolic panel    Acquired hypothyroidism  - stable on levothyroxine   - VENOUS COLLECTION  - TSH with free T4 reflex  - levothyroxine (SYNTHROID/LEVOTHROID) 125 MCG tablet  Dispense: 90 tablet; Refill: 3  - TSH with free T4 reflex    Encounter for screening mammogram for breast cancer  - MA Screening Bilateral w/ Jesu  - VENOUS COLLECTION      Patient has been advised of split  billing requirements and indicates understanding: Yes        Counseling  Appropriate preventive services were addressed with this patient via screening, questionnaire, or discussion as appropriate for fall prevention, nutrition, physical activity, Tobacco-use cessation, social engagement, weight loss and cognition.  Checklist reviewing preventive services available has been given to the patient.  Reviewed patient's diet, addressing concerns and/or questions.   She is at risk for psychosocial distress and has been provided with information to reduce risk.       Follow-up  Return in about 53 weeks (around 5/20/2026) for Annual Wellness Visit.    Tulio Perez is a 64 year old, presenting for the following:  Physical (Fasting) and Health Maintenance (Mammo: Due, last in 2023/Colonoscopy and DEXA UTD/AAP Due)           HPI     1.) RLS: rare use of clonazepam, #30 lasts 12 months     2.) depression: stable on wellbutrin    3.) hypothyroidism: levothyroxine 125mcg, asymptomatic today    4.) HTN:  lisinopril-hydrochlorothiazide and potassium d/t hydrochlorothiazide, asymptomatic today       Advance Care Planning    Did not discuss today         5/14/2025   General Health   How would you rate your overall physical health? Good   Feel stress (tense, anxious, or unable to sleep) To some extent   (!) STRESS CONCERN      5/14/2025   Nutrition   Diet: Regular (no restrictions)         5/14/2025   Exercise   Days per week of moderate/strenous exercise 5 days   Average minutes spent exercising at this level 30 min         5/14/2025   Social Factors   Frequency of gathering with friends or relatives Twice a week   Worry food won't last until get money to buy more No   Food not last or not have enough money for food? No   Do you have housing? (Housing is defined as stable permanent housing and does not include staying outside in a car, in a tent, in an abandoned building, in an overnight shelter, or couch-surfing.) Yes   Are you  worried about losing your housing? No   Lack of transportation? No   Unable to get utilities (heat,electricity)? No         5/14/2025   Fall Risk   Fallen 2 or more times in the past year? No    Trouble with walking or balance? No        Proxy-reported          5/14/2025   Activities of Daily Living- Home Safety   Needs help with the following daily activites None of the above   Safety concerns in the home None of the above         5/14/2025   Dental   Dentist two times every year? Yes         5/14/2025   Hearing Screening   Hearing concerns? None of the above         5/14/2025   Driving Risk Screening   Patient/family members have concerns about driving No         5/14/2025   General Alertness/Fatigue Screening   Have you been more tired than usual lately? No         5/14/2025   Urinary Incontinence Screening   Bothered by leaking urine in past 6 months No         Today's PHQ-9 Score:       9/30/2024     2:18 PM   PHQ-9 SCORE   PHQ-9 Total Score 1           5/14/2025   Substance Use   Alcohol more than 3/day or more than 7/wk No   Do you have a current opioid prescription? No   How severe/bad is pain from 1 to 10? 0/10 (No Pain)   Do you use any other substances recreationally? No     Social History     Tobacco Use    Smoking status: Never    Smokeless tobacco: Never   Substance Use Topics    Alcohol use: Yes     Alcohol/week: 2.0 standard drinks of alcohol     Types: 2 Standard drinks or equivalent per week    Drug use: No           3/27/2023   LAST FHS-7 RESULTS   1st degree relative breast or ovarian cancer Yes   Any relative bilateral breast cancer No   Any male have breast cancer No   Any ONE woman have BOTH breast AND ovarian cancer No   Any woman with breast cancer before 50yrs Yes   2 or more relatives with breast AND/OR ovarian cancer Yes   2 or more relatives with breast AND/OR bowel cancer No        Mammogram Screening - Mammogram every 1-2 years updated in Health Maintenance based on mutual decision  making      History of abnormal Pap smear: No - age 30- 64 PAP with HPV every 5 years recommended       ASCVD Risk   The 10-year ASCVD risk score (Haven HANCOCK, et al., 2019) is: 8.2%    Values used to calculate the score:      Age: 64 years      Sex: Female      Is Non- : No      Diabetic: No      Tobacco smoker: No      Systolic Blood Pressure: 134 mmHg      Is BP treated: Yes      HDL Cholesterol: 64 mg/dL      Total Cholesterol: 231 mg/dL    Fracture Risk Assessment Tool  Link to Frax Calculator  Use the information below to complete the Frax calculator  : 1960  Sex: female  Weight (kg): 73.5 kg (actual weight)  Height (cm): 0 cm  Previous Fragility Fracture:  No  History of parent with fractured hip:  No  Current Smoking:  No  Patient has been on glucocorticoids for more than 3 months (5mg/day or more): No  Rheumatoid Arthritis on Problem List:  No  Secondary Osteoporosis on Problem List:  No  Consumes 3 or more units of alcohol per day: No  Femoral Neck BMD (g/cm2)            Reviewed and updated as needed this visit by Provider                    Past Medical History:   Diagnosis Date    Asthma     while in [place] she had no asthma    Colon adenoma     GERD (gastroesophageal reflux disease)     High cholesterol     Migraines     Perioral dermatitis     Psoriasis 2013     developed in [place]- treated with steroids , just sarna in 2016    Restless leg syndrome     Rhinitis      Lab work is in process  Current providers sharing in care for this patient include:  Patient Care Team:  Claire Gallego APRN CNP as PCP - General (Nurse Practitioner Primary Care)  Claire Gallego APRN CNP as Assigned PCP    The following health maintenance items are reviewed in Epic and correct as of today:  Health Maintenance   Topic Date Due    COVID-19 Vaccine ( season) 2025    PHQ-9  2025    BMP  2025    LIPID  2025    MAMMO SCREENING  2025    TSH  "W/FREE T4 REFLEX  05/06/2025    ASTHMA CONTROL TEST  11/14/2025    MEDICARE ANNUAL WELLNESS VISIT  05/14/2026    ASTHMA ACTION PLAN  05/14/2026    FALL RISK ASSESSMENT  05/14/2026    ADVANCE CARE PLANNING  10/20/2026    Pneumococcal Vaccine: 50+ Years (3 of 3 - PCV20 or PCV21) 10/20/2026    DIABETES SCREENING  05/06/2027    COLORECTAL CANCER SCREENING  09/05/2028    DTAP/TDAP/TD IMMUNIZATION (3 - Td or Tdap) 10/20/2031    DEXA  03/11/2034    HEPATITIS C SCREENING  Completed    DEPRESSION ACTION PLAN  Completed    INFLUENZA VACCINE  Completed    ZOSTER IMMUNIZATION  Completed    RSV VACCINE  Completed    HPV IMMUNIZATION  Aged Out    MENINGITIS IMMUNIZATION  Aged Out    HIV SCREENING  Discontinued         Review of Systems  Constitutional, neuro, ENT, endocrine, pulmonary, cardiac, gastrointestinal, genitourinary, musculoskeletal, integument and psychiatric systems are negative, except as otherwise noted.     Objective    Exam  /49   Pulse 78   Wt 73.5 kg (162 lb)   SpO2 98%   BMI 26.55 kg/m     Estimated body mass index is 26.55 kg/m  as calculated from the following:    Height as of 5/6/24: 1.664 m (5' 5.5\").    Weight as of this encounter: 73.5 kg (162 lb).    Physical Exam  GENERAL: alert and no distress  EYES: Eyes grossly normal to inspection, PERRL and conjunctivae and sclerae normal  HENT: ear canals and TM's normal, nose and mouth without ulcers or lesions  NECK: no adenopathy, no asymmetry, masses, or scars  RESP: lungs clear to auscultation - no rales, rhonchi or wheezes  CV: regular rate and rhythm, normal S1 S2, no S3 or S4, no murmur, click or rub, no peripheral edema  ABDOMEN: soft, nontender, no hepatosplenomegaly, no masses and bowel sounds normal  MS: no gross musculoskeletal defects noted, no edema  SKIN: no suspicious lesions or rashes  NEURO: Normal strength and tone, mentation intact and speech normal  PSYCH: mentation appears normal, affect normal/bright        Signed Electronically " by: LUPE Pinedo CNP

## 2025-06-14 ENCOUNTER — HEALTH MAINTENANCE LETTER (OUTPATIENT)
Age: 65
End: 2025-06-14

## 2025-07-23 ENCOUNTER — HOSPITAL ENCOUNTER (OUTPATIENT)
Dept: MAMMOGRAPHY | Facility: CLINIC | Age: 65
Discharge: HOME | End: 2025-07-23
Payer: COMMERCIAL

## 2025-07-23 DIAGNOSIS — Z12.31 ENCOUNTER FOR SCREENING MAMMOGRAM FOR BREAST CANCER: ICD-10-CM

## 2025-07-23 PROCEDURE — 77063 BREAST TOMOSYNTHESIS BI: CPT

## 2025-08-13 DIAGNOSIS — N95.1 MENOPAUSAL SYNDROME (HOT FLASHES): ICD-10-CM

## 2025-08-13 RX ORDER — ESTRADIOL 0.5 MG/1
0.5 TABLET ORAL DAILY
Qty: 90 TABLET | Refills: 2 | Status: SHIPPED | OUTPATIENT
Start: 2025-08-13